# Patient Record
Sex: FEMALE | Race: WHITE | NOT HISPANIC OR LATINO | URBAN - METROPOLITAN AREA
[De-identification: names, ages, dates, MRNs, and addresses within clinical notes are randomized per-mention and may not be internally consistent; named-entity substitution may affect disease eponyms.]

---

## 2018-01-01 ENCOUNTER — OUTPATIENT (OUTPATIENT)
Dept: OUTPATIENT SERVICES | Facility: HOSPITAL | Age: 83
LOS: 1 days | Discharge: HOME | End: 2018-01-01

## 2018-01-01 DIAGNOSIS — M06.9 RHEUMATOID ARTHRITIS, UNSPECIFIED: ICD-10-CM

## 2018-01-01 DIAGNOSIS — R70.0 ELEVATED ERYTHROCYTE SEDIMENTATION RATE: ICD-10-CM

## 2018-01-01 DIAGNOSIS — N18.2 CHRONIC KIDNEY DISEASE, STAGE 2 (MILD): ICD-10-CM

## 2018-01-01 DIAGNOSIS — D64.9 ANEMIA, UNSPECIFIED: ICD-10-CM

## 2018-01-01 DIAGNOSIS — E78.00 PURE HYPERCHOLESTEROLEMIA, UNSPECIFIED: ICD-10-CM

## 2018-01-01 DIAGNOSIS — E11.9 TYPE 2 DIABETES MELLITUS WITHOUT COMPLICATIONS: ICD-10-CM

## 2019-01-01 ENCOUNTER — INPATIENT (INPATIENT)
Facility: HOSPITAL | Age: 84
LOS: 21 days | End: 2019-10-01
Attending: HOSPITALIST | Admitting: HOSPITALIST
Payer: MEDICARE

## 2019-01-01 VITALS
SYSTOLIC BLOOD PRESSURE: 146 MMHG | RESPIRATION RATE: 18 BRPM | OXYGEN SATURATION: 96 % | HEART RATE: 78 BPM | DIASTOLIC BLOOD PRESSURE: 80 MMHG | TEMPERATURE: 94 F

## 2019-01-01 VITALS — OXYGEN SATURATION: 58 % | HEART RATE: 98 BPM

## 2019-01-01 DIAGNOSIS — E03.9 HYPOTHYROIDISM, UNSPECIFIED: ICD-10-CM

## 2019-01-01 DIAGNOSIS — M75.101 UNSPECIFIED ROTATOR CUFF TEAR OR RUPTURE OF RIGHT SHOULDER, NOT SPECIFIED AS TRAUMATIC: ICD-10-CM

## 2019-01-01 LAB
% ALBUMIN: 43.7 % — SIGNIFICANT CHANGE UP
% ALPHA 1: 16.1 % — SIGNIFICANT CHANGE UP
% ALPHA 2: 13.5 % — SIGNIFICANT CHANGE UP
% BETA: 18.4 % — SIGNIFICANT CHANGE UP
% GAMMA: 8.3 % — SIGNIFICANT CHANGE UP
-  AMIKACIN: SIGNIFICANT CHANGE UP
-  AMOXICILLIN/CLAVULANIC ACID: SIGNIFICANT CHANGE UP
-  AMPICILLIN/SULBACTAM: SIGNIFICANT CHANGE UP
-  AMPICILLIN/SULBACTAM: SIGNIFICANT CHANGE UP
-  AMPICILLIN: SIGNIFICANT CHANGE UP
-  AMPICILLIN: SIGNIFICANT CHANGE UP
-  AZTREONAM: SIGNIFICANT CHANGE UP
-  CEFAZOLIN: SIGNIFICANT CHANGE UP
-  CEFAZOLIN: SIGNIFICANT CHANGE UP
-  CEFEPIME: SIGNIFICANT CHANGE UP
-  CEFOXITIN: SIGNIFICANT CHANGE UP
-  CEFOXITIN: SIGNIFICANT CHANGE UP
-  CEFTAZIDIME/AVIBACTAM: SIGNIFICANT CHANGE UP
-  CEFTAZIDIME: SIGNIFICANT CHANGE UP
-  CEFTAZIDIME: SIGNIFICANT CHANGE UP
-  CEFTOLOZANE/TAZOBACTAM: SIGNIFICANT CHANGE UP
-  CEFTRIAXONE: SIGNIFICANT CHANGE UP
-  CEFTRIAXONE: SIGNIFICANT CHANGE UP
-  CIPROFLOXACIN: SIGNIFICANT CHANGE UP
-  ERTAPENEM: SIGNIFICANT CHANGE UP
-  GENTAMICIN: SIGNIFICANT CHANGE UP
-  IMIPENEM: SIGNIFICANT CHANGE UP
-  LEVOFLOXACIN: SIGNIFICANT CHANGE UP
-  MEROPENEM: SIGNIFICANT CHANGE UP
-  NITROFURANTOIN: SIGNIFICANT CHANGE UP
-  PIPERACILLIN/TAZOBACTAM: SIGNIFICANT CHANGE UP
-  TIGECYCLINE: SIGNIFICANT CHANGE UP
-  TOBRAMYCIN: SIGNIFICANT CHANGE UP
-  TRIMETHOPRIM/SULFAMETHOXAZOLE: SIGNIFICANT CHANGE UP
-  TRIMETHOPRIM/SULFAMETHOXAZOLE: SIGNIFICANT CHANGE UP
ALBUMIN SERPL ELPH-MCNC: 1.5 G/DL — LOW (ref 3.5–5.2)
ALBUMIN SERPL ELPH-MCNC: 1.7 G/DL — LOW (ref 3.5–5.2)
ALBUMIN SERPL ELPH-MCNC: 1.7 G/DL — LOW (ref 3.5–5.2)
ALBUMIN SERPL ELPH-MCNC: 1.8 G/DL — LOW (ref 3.5–5.2)
ALBUMIN SERPL ELPH-MCNC: 1.8 G/DL — LOW (ref 3.6–5.5)
ALBUMIN SERPL ELPH-MCNC: 1.9 G/DL — LOW (ref 3.5–5.2)
ALBUMIN SERPL ELPH-MCNC: 2 G/DL — LOW (ref 3.5–5.2)
ALBUMIN SERPL ELPH-MCNC: 2.1 G/DL — LOW (ref 3.5–5.2)
ALBUMIN SERPL ELPH-MCNC: 2.1 G/DL — LOW (ref 3.5–5.2)
ALBUMIN SERPL ELPH-MCNC: 2.2 G/DL — LOW (ref 3.5–5.2)
ALBUMIN SERPL ELPH-MCNC: 2.3 G/DL — LOW (ref 3.5–5.2)
ALBUMIN SERPL ELPH-MCNC: 2.4 G/DL — LOW (ref 3.5–5.2)
ALBUMIN SERPL ELPH-MCNC: 2.5 G/DL — LOW (ref 3.5–5.2)
ALBUMIN SERPL ELPH-MCNC: 2.5 G/DL — LOW (ref 3.5–5.2)
ALBUMIN/GLOB SERPL ELPH: 0.8 RATIO — SIGNIFICANT CHANGE UP
ALDOST SERPL-MCNC: 5.5 NG/DL — SIGNIFICANT CHANGE UP
ALP SERPL-CCNC: 170 U/L — HIGH (ref 30–115)
ALP SERPL-CCNC: 173 U/L — HIGH (ref 30–115)
ALP SERPL-CCNC: 176 U/L — HIGH (ref 30–115)
ALP SERPL-CCNC: 179 U/L — HIGH (ref 30–115)
ALP SERPL-CCNC: 181 U/L — HIGH (ref 30–115)
ALP SERPL-CCNC: 182 U/L — HIGH (ref 30–115)
ALP SERPL-CCNC: 185 U/L — HIGH (ref 30–115)
ALP SERPL-CCNC: 190 U/L — HIGH (ref 30–115)
ALP SERPL-CCNC: 198 U/L — HIGH (ref 30–115)
ALP SERPL-CCNC: 206 U/L — HIGH (ref 30–115)
ALP SERPL-CCNC: 211 U/L — HIGH (ref 30–115)
ALP SERPL-CCNC: 213 U/L — HIGH (ref 30–115)
ALP SERPL-CCNC: 219 U/L — HIGH (ref 30–115)
ALP SERPL-CCNC: 224 U/L — HIGH (ref 30–115)
ALP SERPL-CCNC: 224 U/L — HIGH (ref 30–115)
ALP SERPL-CCNC: 229 U/L — HIGH (ref 30–115)
ALP SERPL-CCNC: 241 U/L — HIGH (ref 30–115)
ALP SERPL-CCNC: 246 U/L — HIGH (ref 30–115)
ALP SERPL-CCNC: 253 U/L — HIGH (ref 30–115)
ALP SERPL-CCNC: 257 U/L — HIGH (ref 30–115)
ALP SERPL-CCNC: 285 U/L — HIGH (ref 30–115)
ALP SERPL-CCNC: 314 U/L — HIGH (ref 30–115)
ALPHA1 GLOB SERPL ELPH-MCNC: 0.7 G/DL — HIGH (ref 0.1–0.4)
ALPHA2 GLOB SERPL ELPH-MCNC: 0.6 G/DL — SIGNIFICANT CHANGE UP (ref 0.5–1)
ALT FLD-CCNC: 37 U/L — SIGNIFICANT CHANGE UP (ref 0–41)
ALT FLD-CCNC: 39 U/L — SIGNIFICANT CHANGE UP (ref 0–41)
ALT FLD-CCNC: 40 U/L — SIGNIFICANT CHANGE UP (ref 0–41)
ALT FLD-CCNC: 41 U/L — SIGNIFICANT CHANGE UP (ref 0–41)
ALT FLD-CCNC: 42 U/L — HIGH (ref 0–41)
ALT FLD-CCNC: 43 U/L — HIGH (ref 0–41)
ALT FLD-CCNC: 45 U/L — HIGH (ref 0–41)
ALT FLD-CCNC: 45 U/L — HIGH (ref 0–41)
ALT FLD-CCNC: 46 U/L — HIGH (ref 0–41)
ALT FLD-CCNC: 46 U/L — HIGH (ref 0–41)
ALT FLD-CCNC: 48 U/L — HIGH (ref 0–41)
ALT FLD-CCNC: 48 U/L — HIGH (ref 0–41)
ALT FLD-CCNC: 51 U/L — HIGH (ref 0–41)
ALT FLD-CCNC: 53 U/L — HIGH (ref 0–41)
ALT FLD-CCNC: 54 U/L — HIGH (ref 0–41)
ALT FLD-CCNC: 62 U/L — HIGH (ref 0–41)
ALT FLD-CCNC: 62 U/L — HIGH (ref 0–41)
ALT FLD-CCNC: 71 U/L — HIGH (ref 0–41)
ALT FLD-CCNC: 72 U/L — HIGH (ref 0–41)
ALT FLD-CCNC: 72 U/L — HIGH (ref 0–41)
ANION GAP SERPL CALC-SCNC: 10 MMOL/L — SIGNIFICANT CHANGE UP (ref 7–14)
ANION GAP SERPL CALC-SCNC: 11 MMOL/L — SIGNIFICANT CHANGE UP (ref 7–14)
ANION GAP SERPL CALC-SCNC: 12 MMOL/L — SIGNIFICANT CHANGE UP (ref 7–14)
ANION GAP SERPL CALC-SCNC: 13 MMOL/L — SIGNIFICANT CHANGE UP (ref 7–14)
ANION GAP SERPL CALC-SCNC: 14 MMOL/L — SIGNIFICANT CHANGE UP (ref 7–14)
ANION GAP SERPL CALC-SCNC: 15 MMOL/L — HIGH (ref 7–14)
ANION GAP SERPL CALC-SCNC: 16 MMOL/L — HIGH (ref 7–14)
ANION GAP SERPL CALC-SCNC: 16 MMOL/L — HIGH (ref 7–14)
ANION GAP SERPL CALC-SCNC: 19 MMOL/L — HIGH (ref 7–14)
ANION GAP SERPL CALC-SCNC: 8 MMOL/L — SIGNIFICANT CHANGE UP (ref 7–14)
ANION GAP SERPL CALC-SCNC: 8 MMOL/L — SIGNIFICANT CHANGE UP (ref 7–14)
ANION GAP SERPL CALC-SCNC: 9 MMOL/L — SIGNIFICANT CHANGE UP (ref 7–14)
ANION GAP SERPL CALC-SCNC: 9 MMOL/L — SIGNIFICANT CHANGE UP (ref 7–14)
ANISOCYTOSIS BLD QL: SLIGHT — SIGNIFICANT CHANGE UP
APPEARANCE UR: ABNORMAL
APPEARANCE UR: ABNORMAL
APTT BLD: 106.9 SEC — CRITICAL HIGH (ref 27–39.2)
APTT BLD: 23.4 SEC — CRITICAL LOW (ref 27–39.2)
APTT BLD: 27 SEC — SIGNIFICANT CHANGE UP (ref 27–39.2)
APTT BLD: 28 SEC — SIGNIFICANT CHANGE UP (ref 27–39.2)
APTT BLD: 28.4 SEC — SIGNIFICANT CHANGE UP (ref 27–39.2)
APTT BLD: 29.1 SEC — SIGNIFICANT CHANGE UP (ref 27–39.2)
APTT BLD: 31.2 SEC — SIGNIFICANT CHANGE UP (ref 27–39.2)
APTT BLD: 33.7 SEC — SIGNIFICANT CHANGE UP (ref 27–39.2)
APTT BLD: 44.1 SEC — HIGH (ref 27–39.2)
APTT BLD: 48.3 SEC — HIGH (ref 27–39.2)
APTT BLD: 48.9 SEC — HIGH (ref 27–39.2)
APTT BLD: 50.3 SEC — HIGH (ref 27–39.2)
APTT BLD: 52.4 SEC — HIGH (ref 27–39.2)
APTT BLD: 52.9 SEC — HIGH (ref 27–39.2)
APTT BLD: 56.1 SEC — HIGH (ref 27–39.2)
APTT BLD: 56.3 SEC — HIGH (ref 27–39.2)
APTT BLD: 58.6 SEC — HIGH (ref 27–39.2)
APTT BLD: 61.1 SEC — HIGH (ref 27–39.2)
APTT BLD: 61.1 SEC — HIGH (ref 27–39.2)
APTT BLD: 61.6 SEC — HIGH (ref 27–39.2)
APTT BLD: 62.7 SEC — HIGH (ref 27–39.2)
APTT BLD: 63.9 SEC — HIGH (ref 27–39.2)
APTT BLD: 66.8 SEC — HIGH (ref 27–39.2)
APTT BLD: 70.5 SEC — CRITICAL HIGH (ref 27–39.2)
APTT BLD: 76.9 SEC — CRITICAL HIGH (ref 27–39.2)
APTT BLD: 77.3 SEC — CRITICAL HIGH (ref 27–39.2)
APTT BLD: 77.5 SEC — CRITICAL HIGH (ref 27–39.2)
APTT BLD: 81.3 SEC — CRITICAL HIGH (ref 27–39.2)
APTT BLD: 82.6 SEC — CRITICAL HIGH (ref 27–39.2)
APTT BLD: 82.8 SEC — CRITICAL HIGH (ref 27–39.2)
APTT BLD: 86 SEC — CRITICAL HIGH (ref 27–39.2)
APTT BLD: 93.2 SEC — CRITICAL HIGH (ref 27–39.2)
APTT BLD: 97.5 SEC — CRITICAL HIGH (ref 27–39.2)
APTT BLD: >200 SEC — CRITICAL HIGH (ref 27–39.2)
AST SERPL-CCNC: 20 U/L — SIGNIFICANT CHANGE UP (ref 0–41)
AST SERPL-CCNC: 21 U/L — SIGNIFICANT CHANGE UP (ref 0–41)
AST SERPL-CCNC: 22 U/L — SIGNIFICANT CHANGE UP (ref 0–41)
AST SERPL-CCNC: 22 U/L — SIGNIFICANT CHANGE UP (ref 0–41)
AST SERPL-CCNC: 23 U/L — SIGNIFICANT CHANGE UP (ref 0–41)
AST SERPL-CCNC: 24 U/L — SIGNIFICANT CHANGE UP (ref 0–41)
AST SERPL-CCNC: 25 U/L — SIGNIFICANT CHANGE UP (ref 0–41)
AST SERPL-CCNC: 27 U/L — SIGNIFICANT CHANGE UP (ref 0–41)
AST SERPL-CCNC: 28 U/L — SIGNIFICANT CHANGE UP (ref 0–41)
AST SERPL-CCNC: 30 U/L — SIGNIFICANT CHANGE UP (ref 0–41)
AST SERPL-CCNC: 31 U/L — SIGNIFICANT CHANGE UP (ref 0–41)
AST SERPL-CCNC: 32 U/L — SIGNIFICANT CHANGE UP (ref 0–41)
AST SERPL-CCNC: 33 U/L — SIGNIFICANT CHANGE UP (ref 0–41)
AST SERPL-CCNC: 39 U/L — SIGNIFICANT CHANGE UP (ref 0–41)
AST SERPL-CCNC: 41 U/L — SIGNIFICANT CHANGE UP (ref 0–41)
AST SERPL-CCNC: 42 U/L — HIGH (ref 0–41)
AST SERPL-CCNC: 49 U/L — HIGH (ref 0–41)
AST SERPL-CCNC: 50 U/L — HIGH (ref 0–41)
B-GLOBULIN SERPL ELPH-MCNC: 0.8 G/DL — SIGNIFICANT CHANGE UP (ref 0.5–1)
BACTERIA # UR AUTO: ABNORMAL
BACTERIA # UR AUTO: NEGATIVE — SIGNIFICANT CHANGE UP
BASE EXCESS BLDA CALC-SCNC: -3 MMOL/L — LOW (ref -2–2)
BASE EXCESS BLDV CALC-SCNC: -6.3 MMOL/L — LOW (ref -2–2)
BASE EXCESS BLDV CALC-SCNC: -6.3 MMOL/L — LOW (ref -2–2)
BASOPHILS # BLD AUTO: 0 K/UL — SIGNIFICANT CHANGE UP (ref 0–0.2)
BASOPHILS # BLD AUTO: 0.01 K/UL — SIGNIFICANT CHANGE UP (ref 0–0.2)
BASOPHILS # BLD AUTO: 0.04 K/UL — SIGNIFICANT CHANGE UP (ref 0–0.2)
BASOPHILS # BLD AUTO: 0.05 K/UL — SIGNIFICANT CHANGE UP (ref 0–0.2)
BASOPHILS # BLD AUTO: 0.06 K/UL — SIGNIFICANT CHANGE UP (ref 0–0.2)
BASOPHILS # BLD AUTO: 0.07 K/UL — SIGNIFICANT CHANGE UP (ref 0–0.2)
BASOPHILS # BLD AUTO: 0.08 K/UL — SIGNIFICANT CHANGE UP (ref 0–0.2)
BASOPHILS # BLD AUTO: 0.09 K/UL — SIGNIFICANT CHANGE UP (ref 0–0.2)
BASOPHILS # BLD AUTO: 0.1 K/UL — SIGNIFICANT CHANGE UP (ref 0–0.2)
BASOPHILS NFR BLD AUTO: 0 % — SIGNIFICANT CHANGE UP (ref 0–1)
BASOPHILS NFR BLD AUTO: 0.1 % — SIGNIFICANT CHANGE UP (ref 0–1)
BASOPHILS NFR BLD AUTO: 0.3 % — SIGNIFICANT CHANGE UP (ref 0–1)
BASOPHILS NFR BLD AUTO: 0.3 % — SIGNIFICANT CHANGE UP (ref 0–1)
BASOPHILS NFR BLD AUTO: 0.4 % — SIGNIFICANT CHANGE UP (ref 0–1)
BASOPHILS NFR BLD AUTO: 0.5 % — SIGNIFICANT CHANGE UP (ref 0–1)
BASOPHILS NFR BLD AUTO: 0.6 % — SIGNIFICANT CHANGE UP (ref 0–1)
BASOPHILS NFR BLD AUTO: 0.7 % — SIGNIFICANT CHANGE UP (ref 0–1)
BASOPHILS NFR BLD AUTO: 0.9 % — SIGNIFICANT CHANGE UP (ref 0–1)
BILIRUB SERPL-MCNC: 0.2 MG/DL — SIGNIFICANT CHANGE UP (ref 0.2–1.2)
BILIRUB SERPL-MCNC: 0.3 MG/DL — SIGNIFICANT CHANGE UP (ref 0.2–1.2)
BILIRUB SERPL-MCNC: 0.4 MG/DL — SIGNIFICANT CHANGE UP (ref 0.2–1.2)
BILIRUB SERPL-MCNC: 0.6 MG/DL — SIGNIFICANT CHANGE UP (ref 0.2–1.2)
BILIRUB SERPL-MCNC: <0.2 MG/DL — SIGNIFICANT CHANGE UP (ref 0.2–1.2)
BILIRUB UR-MCNC: NEGATIVE — SIGNIFICANT CHANGE UP
BILIRUB UR-MCNC: NEGATIVE — SIGNIFICANT CHANGE UP
BLD GP AB SCN SERPL QL: SIGNIFICANT CHANGE UP
BUN SERPL-MCNC: 54 MG/DL — HIGH (ref 10–20)
BUN SERPL-MCNC: 56 MG/DL — HIGH (ref 10–20)
BUN SERPL-MCNC: 56 MG/DL — HIGH (ref 10–20)
BUN SERPL-MCNC: 57 MG/DL — HIGH (ref 10–20)
BUN SERPL-MCNC: 58 MG/DL — HIGH (ref 10–20)
BUN SERPL-MCNC: 59 MG/DL — HIGH (ref 10–20)
BUN SERPL-MCNC: 59 MG/DL — HIGH (ref 10–20)
BUN SERPL-MCNC: 60 MG/DL — HIGH (ref 10–20)
BUN SERPL-MCNC: 60 MG/DL — HIGH (ref 10–20)
BUN SERPL-MCNC: 62 MG/DL — CRITICAL HIGH (ref 10–20)
BUN SERPL-MCNC: 62 MG/DL — CRITICAL HIGH (ref 10–20)
BUN SERPL-MCNC: 63 MG/DL — CRITICAL HIGH (ref 10–20)
BUN SERPL-MCNC: 63 MG/DL — CRITICAL HIGH (ref 10–20)
BUN SERPL-MCNC: 64 MG/DL — CRITICAL HIGH (ref 10–20)
BUN SERPL-MCNC: 65 MG/DL — CRITICAL HIGH (ref 10–20)
BUN SERPL-MCNC: 67 MG/DL — CRITICAL HIGH (ref 10–20)
BUN SERPL-MCNC: 68 MG/DL — CRITICAL HIGH (ref 10–20)
BUN SERPL-MCNC: 70 MG/DL — CRITICAL HIGH (ref 10–20)
BUN SERPL-MCNC: 73 MG/DL — CRITICAL HIGH (ref 10–20)
BUN SERPL-MCNC: 74 MG/DL — CRITICAL HIGH (ref 10–20)
BUN SERPL-MCNC: 75 MG/DL — CRITICAL HIGH (ref 10–20)
BUN SERPL-MCNC: 75 MG/DL — CRITICAL HIGH (ref 10–20)
BUN SERPL-MCNC: 76 MG/DL — CRITICAL HIGH (ref 10–20)
BUN SERPL-MCNC: 78 MG/DL — CRITICAL HIGH (ref 10–20)
BUN SERPL-MCNC: 80 MG/DL — CRITICAL HIGH (ref 10–20)
BUN SERPL-MCNC: 90 MG/DL — CRITICAL HIGH (ref 10–20)
BURR CELLS BLD QL SMEAR: PRESENT — SIGNIFICANT CHANGE UP
C DIFF BY PCR RESULT: POSITIVE
C DIFF TOX GENS STL QL NAA+PROBE: SIGNIFICANT CHANGE UP
CA-I SERPL-SCNC: 1.31 MMOL/L — HIGH (ref 1.12–1.3)
CA-I SERPL-SCNC: 1.35 MMOL/L — HIGH (ref 1.12–1.3)
CALCIUM SERPL-MCNC: 7.4 MG/DL — LOW (ref 8.5–10.1)
CALCIUM SERPL-MCNC: 7.5 MG/DL — LOW (ref 8.5–10.1)
CALCIUM SERPL-MCNC: 7.6 MG/DL — LOW (ref 8.5–10.1)
CALCIUM SERPL-MCNC: 7.8 MG/DL — LOW (ref 8.5–10.1)
CALCIUM SERPL-MCNC: 8.1 MG/DL — LOW (ref 8.5–10.1)
CALCIUM SERPL-MCNC: 8.3 MG/DL — LOW (ref 8.5–10.1)
CALCIUM SERPL-MCNC: 8.4 MG/DL — LOW (ref 8.5–10.1)
CALCIUM SERPL-MCNC: 8.5 MG/DL — SIGNIFICANT CHANGE UP (ref 8.5–10.1)
CALCIUM SERPL-MCNC: 8.5 MG/DL — SIGNIFICANT CHANGE UP (ref 8.5–10.1)
CALCIUM SERPL-MCNC: 8.6 MG/DL — SIGNIFICANT CHANGE UP (ref 8.5–10.1)
CALCIUM SERPL-MCNC: 8.6 MG/DL — SIGNIFICANT CHANGE UP (ref 8.5–10.1)
CALCIUM SERPL-MCNC: 8.7 MG/DL — SIGNIFICANT CHANGE UP (ref 8.5–10.1)
CALCIUM SERPL-MCNC: 8.7 MG/DL — SIGNIFICANT CHANGE UP (ref 8.5–10.1)
CALCIUM SERPL-MCNC: 8.8 MG/DL — SIGNIFICANT CHANGE UP (ref 8.5–10.1)
CALCIUM SERPL-MCNC: 8.8 MG/DL — SIGNIFICANT CHANGE UP (ref 8.5–10.1)
CALCIUM SERPL-MCNC: 8.9 MG/DL — SIGNIFICANT CHANGE UP (ref 8.5–10.1)
CALCIUM SERPL-MCNC: 9 MG/DL — SIGNIFICANT CHANGE UP (ref 8.5–10.1)
CALCIUM SERPL-MCNC: 9 MG/DL — SIGNIFICANT CHANGE UP (ref 8.5–10.1)
CALCIUM SERPL-MCNC: 9.1 MG/DL — SIGNIFICANT CHANGE UP (ref 8.5–10.1)
CALCIUM SERPL-MCNC: 9.2 MG/DL — SIGNIFICANT CHANGE UP (ref 8.5–10.1)
CALCIUM SERPL-MCNC: 9.3 MG/DL — SIGNIFICANT CHANGE UP (ref 8.5–10.1)
CALCIUM SERPL-MCNC: 9.3 MG/DL — SIGNIFICANT CHANGE UP (ref 8.5–10.1)
CALCIUM SERPL-MCNC: 9.4 MG/DL — SIGNIFICANT CHANGE UP (ref 8.5–10.1)
CALCIUM SERPL-MCNC: 9.7 MG/DL — SIGNIFICANT CHANGE UP (ref 8.5–10.1)
CHLORIDE SERPL-SCNC: 100 MMOL/L — SIGNIFICANT CHANGE UP (ref 98–110)
CHLORIDE SERPL-SCNC: 101 MMOL/L — SIGNIFICANT CHANGE UP (ref 98–110)
CHLORIDE SERPL-SCNC: 101 MMOL/L — SIGNIFICANT CHANGE UP (ref 98–110)
CHLORIDE SERPL-SCNC: 102 MMOL/L — SIGNIFICANT CHANGE UP (ref 98–110)
CHLORIDE SERPL-SCNC: 102 MMOL/L — SIGNIFICANT CHANGE UP (ref 98–110)
CHLORIDE SERPL-SCNC: 104 MMOL/L — SIGNIFICANT CHANGE UP (ref 98–110)
CHLORIDE SERPL-SCNC: 105 MMOL/L — SIGNIFICANT CHANGE UP (ref 98–110)
CHLORIDE SERPL-SCNC: 106 MMOL/L — SIGNIFICANT CHANGE UP (ref 98–110)
CHLORIDE SERPL-SCNC: 107 MMOL/L — SIGNIFICANT CHANGE UP (ref 98–110)
CHLORIDE SERPL-SCNC: 107 MMOL/L — SIGNIFICANT CHANGE UP (ref 98–110)
CHLORIDE SERPL-SCNC: 108 MMOL/L — SIGNIFICANT CHANGE UP (ref 98–110)
CHLORIDE SERPL-SCNC: 109 MMOL/L — SIGNIFICANT CHANGE UP (ref 98–110)
CHLORIDE SERPL-SCNC: 110 MMOL/L — SIGNIFICANT CHANGE UP (ref 98–110)
CHLORIDE SERPL-SCNC: 111 MMOL/L — HIGH (ref 98–110)
CHLORIDE SERPL-SCNC: 111 MMOL/L — HIGH (ref 98–110)
CHLORIDE SERPL-SCNC: 112 MMOL/L — HIGH (ref 98–110)
CHLORIDE SERPL-SCNC: 112 MMOL/L — HIGH (ref 98–110)
CHLORIDE SERPL-SCNC: 95 MMOL/L — LOW (ref 98–110)
CHLORIDE SERPL-SCNC: 95 MMOL/L — LOW (ref 98–110)
CHLORIDE SERPL-SCNC: 96 MMOL/L — LOW (ref 98–110)
CHLORIDE SERPL-SCNC: 96 MMOL/L — LOW (ref 98–110)
CHLORIDE SERPL-SCNC: 98 MMOL/L — SIGNIFICANT CHANGE UP (ref 98–110)
CHLORIDE SERPL-SCNC: 98 MMOL/L — SIGNIFICANT CHANGE UP (ref 98–110)
CHLORIDE SERPL-SCNC: 99 MMOL/L — SIGNIFICANT CHANGE UP (ref 98–110)
CHLORIDE SERPL-SCNC: 99 MMOL/L — SIGNIFICANT CHANGE UP (ref 98–110)
CHLORIDE UR-SCNC: 96 — SIGNIFICANT CHANGE UP
CK SERPL-CCNC: 32 U/L — SIGNIFICANT CHANGE UP (ref 0–225)
CK SERPL-CCNC: 32 U/L — SIGNIFICANT CHANGE UP (ref 0–225)
CO2 SERPL-SCNC: 12 MMOL/L — LOW (ref 17–32)
CO2 SERPL-SCNC: 14 MMOL/L — LOW (ref 17–32)
CO2 SERPL-SCNC: 15 MMOL/L — LOW (ref 17–32)
CO2 SERPL-SCNC: 16 MMOL/L — LOW (ref 17–32)
CO2 SERPL-SCNC: 17 MMOL/L — SIGNIFICANT CHANGE UP (ref 17–32)
CO2 SERPL-SCNC: 18 MMOL/L — SIGNIFICANT CHANGE UP (ref 17–32)
CO2 SERPL-SCNC: 19 MMOL/L — SIGNIFICANT CHANGE UP (ref 17–32)
CO2 SERPL-SCNC: 21 MMOL/L — SIGNIFICANT CHANGE UP (ref 17–32)
CO2 SERPL-SCNC: 22 MMOL/L — SIGNIFICANT CHANGE UP (ref 17–32)
COLOR SPEC: YELLOW — SIGNIFICANT CHANGE UP
COLOR SPEC: YELLOW — SIGNIFICANT CHANGE UP
CREAT ?TM UR-MCNC: 26 MG/DL — SIGNIFICANT CHANGE UP
CREAT SERPL-MCNC: 1.7 MG/DL — HIGH (ref 0.7–1.5)
CREAT SERPL-MCNC: 1.7 MG/DL — HIGH (ref 0.7–1.5)
CREAT SERPL-MCNC: 1.8 MG/DL — HIGH (ref 0.7–1.5)
CREAT SERPL-MCNC: 1.9 MG/DL — HIGH (ref 0.7–1.5)
CREAT SERPL-MCNC: 2 MG/DL — HIGH (ref 0.7–1.5)
CREAT SERPL-MCNC: 2.1 MG/DL — HIGH (ref 0.7–1.5)
CREAT SERPL-MCNC: 2.2 MG/DL — HIGH (ref 0.7–1.5)
CREAT SERPL-MCNC: 2.3 MG/DL — HIGH (ref 0.7–1.5)
CREAT SERPL-MCNC: 2.3 MG/DL — HIGH (ref 0.7–1.5)
CREAT SERPL-MCNC: 2.4 MG/DL — HIGH (ref 0.7–1.5)
CREAT SERPL-MCNC: 2.4 MG/DL — HIGH (ref 0.7–1.5)
CREAT SERPL-MCNC: 2.6 MG/DL — HIGH (ref 0.7–1.5)
CREAT SERPL-MCNC: 2.7 MG/DL — HIGH (ref 0.7–1.5)
CREAT SERPL-MCNC: 2.7 MG/DL — HIGH (ref 0.7–1.5)
CREAT SERPL-MCNC: 3.1 MG/DL — HIGH (ref 0.7–1.5)
CREAT SERPL-MCNC: 3.1 MG/DL — HIGH (ref 0.7–1.5)
CULTURE RESULTS: SIGNIFICANT CHANGE UP
DIFF PNL FLD: ABNORMAL
DIFF PNL FLD: ABNORMAL
EOSINOPHIL # BLD AUTO: 0 K/UL — SIGNIFICANT CHANGE UP (ref 0–0.7)
EOSINOPHIL # BLD AUTO: 0.02 K/UL — SIGNIFICANT CHANGE UP (ref 0–0.7)
EOSINOPHIL # BLD AUTO: 0.04 K/UL — SIGNIFICANT CHANGE UP (ref 0–0.7)
EOSINOPHIL # BLD AUTO: 0.08 K/UL — SIGNIFICANT CHANGE UP (ref 0–0.7)
EOSINOPHIL # BLD AUTO: 0.11 K/UL — SIGNIFICANT CHANGE UP (ref 0–0.7)
EOSINOPHIL # BLD AUTO: 0.11 K/UL — SIGNIFICANT CHANGE UP (ref 0–0.7)
EOSINOPHIL # BLD AUTO: 0.12 K/UL — SIGNIFICANT CHANGE UP (ref 0–0.7)
EOSINOPHIL # BLD AUTO: 0.16 K/UL — SIGNIFICANT CHANGE UP (ref 0–0.7)
EOSINOPHIL # BLD AUTO: 0.17 K/UL — SIGNIFICANT CHANGE UP (ref 0–0.7)
EOSINOPHIL # BLD AUTO: 0.23 K/UL — SIGNIFICANT CHANGE UP (ref 0–0.7)
EOSINOPHIL NFR BLD AUTO: 0 % — SIGNIFICANT CHANGE UP (ref 0–8)
EOSINOPHIL NFR BLD AUTO: 0.1 % — SIGNIFICANT CHANGE UP (ref 0–8)
EOSINOPHIL NFR BLD AUTO: 0.3 % — SIGNIFICANT CHANGE UP (ref 0–8)
EOSINOPHIL NFR BLD AUTO: 0.5 % — SIGNIFICANT CHANGE UP (ref 0–8)
EOSINOPHIL NFR BLD AUTO: 0.7 % — SIGNIFICANT CHANGE UP (ref 0–8)
EOSINOPHIL NFR BLD AUTO: 0.9 % — SIGNIFICANT CHANGE UP (ref 0–8)
EOSINOPHIL NFR BLD AUTO: 0.9 % — SIGNIFICANT CHANGE UP (ref 0–8)
EOSINOPHIL NFR BLD AUTO: 1 % — SIGNIFICANT CHANGE UP (ref 0–8)
EOSINOPHIL NFR BLD AUTO: 1.7 % — SIGNIFICANT CHANGE UP (ref 0–8)
EOSINOPHIL NFR BLD AUTO: 2 % — SIGNIFICANT CHANGE UP (ref 0–8)
EPI CELLS # UR: 0 /HPF — SIGNIFICANT CHANGE UP (ref 0–5)
EPI CELLS # UR: 19 /HPF — HIGH (ref 0–5)
ESTIMATED AVERAGE GLUCOSE: 252 MG/DL — HIGH (ref 68–114)
FERRITIN SERPL-MCNC: 198 NG/ML — HIGH (ref 15–150)
GAMMA GLOBULIN: 0.3 G/DL — LOW (ref 0.6–1.6)
GAS PNL BLDA: SIGNIFICANT CHANGE UP
GAS PNL BLDV: 133 MMOL/L — LOW (ref 136–145)
GAS PNL BLDV: 134 MMOL/L — LOW (ref 136–145)
GAS PNL BLDV: SIGNIFICANT CHANGE UP
GIANT PLATELETS BLD QL SMEAR: PRESENT — SIGNIFICANT CHANGE UP
GLUCOSE BLDC GLUCOMTR-MCNC: 102 MG/DL — HIGH (ref 70–99)
GLUCOSE BLDC GLUCOMTR-MCNC: 103 MG/DL — HIGH (ref 70–99)
GLUCOSE BLDC GLUCOMTR-MCNC: 105 MG/DL — HIGH (ref 70–99)
GLUCOSE BLDC GLUCOMTR-MCNC: 108 MG/DL — HIGH (ref 70–99)
GLUCOSE BLDC GLUCOMTR-MCNC: 112 MG/DL — HIGH (ref 70–99)
GLUCOSE BLDC GLUCOMTR-MCNC: 122 MG/DL — HIGH (ref 70–99)
GLUCOSE BLDC GLUCOMTR-MCNC: 122 MG/DL — HIGH (ref 70–99)
GLUCOSE BLDC GLUCOMTR-MCNC: 125 MG/DL — HIGH (ref 70–99)
GLUCOSE BLDC GLUCOMTR-MCNC: 128 MG/DL — HIGH (ref 70–99)
GLUCOSE BLDC GLUCOMTR-MCNC: 135 MG/DL — HIGH (ref 70–99)
GLUCOSE BLDC GLUCOMTR-MCNC: 137 MG/DL — HIGH (ref 70–99)
GLUCOSE BLDC GLUCOMTR-MCNC: 139 MG/DL — HIGH (ref 70–99)
GLUCOSE BLDC GLUCOMTR-MCNC: 141 MG/DL — HIGH (ref 70–99)
GLUCOSE BLDC GLUCOMTR-MCNC: 142 MG/DL — HIGH (ref 70–99)
GLUCOSE BLDC GLUCOMTR-MCNC: 147 MG/DL — HIGH (ref 70–99)
GLUCOSE BLDC GLUCOMTR-MCNC: 148 MG/DL — HIGH (ref 70–99)
GLUCOSE BLDC GLUCOMTR-MCNC: 154 MG/DL — HIGH (ref 70–99)
GLUCOSE BLDC GLUCOMTR-MCNC: 156 MG/DL — HIGH (ref 70–99)
GLUCOSE BLDC GLUCOMTR-MCNC: 167 MG/DL — HIGH (ref 70–99)
GLUCOSE BLDC GLUCOMTR-MCNC: 168 MG/DL — HIGH (ref 70–99)
GLUCOSE BLDC GLUCOMTR-MCNC: 173 MG/DL — HIGH (ref 70–99)
GLUCOSE BLDC GLUCOMTR-MCNC: 175 MG/DL — HIGH (ref 70–99)
GLUCOSE BLDC GLUCOMTR-MCNC: 182 MG/DL — HIGH (ref 70–99)
GLUCOSE BLDC GLUCOMTR-MCNC: 182 MG/DL — HIGH (ref 70–99)
GLUCOSE BLDC GLUCOMTR-MCNC: 183 MG/DL — HIGH (ref 70–99)
GLUCOSE BLDC GLUCOMTR-MCNC: 184 MG/DL — HIGH (ref 70–99)
GLUCOSE BLDC GLUCOMTR-MCNC: 186 MG/DL — HIGH (ref 70–99)
GLUCOSE BLDC GLUCOMTR-MCNC: 187 MG/DL — HIGH (ref 70–99)
GLUCOSE BLDC GLUCOMTR-MCNC: 188 MG/DL — HIGH (ref 70–99)
GLUCOSE BLDC GLUCOMTR-MCNC: 190 MG/DL — HIGH (ref 70–99)
GLUCOSE BLDC GLUCOMTR-MCNC: 190 MG/DL — HIGH (ref 70–99)
GLUCOSE BLDC GLUCOMTR-MCNC: 192 MG/DL — HIGH (ref 70–99)
GLUCOSE BLDC GLUCOMTR-MCNC: 195 MG/DL — HIGH (ref 70–99)
GLUCOSE BLDC GLUCOMTR-MCNC: 196 MG/DL — HIGH (ref 70–99)
GLUCOSE BLDC GLUCOMTR-MCNC: 198 MG/DL — HIGH (ref 70–99)
GLUCOSE BLDC GLUCOMTR-MCNC: 199 MG/DL — HIGH (ref 70–99)
GLUCOSE BLDC GLUCOMTR-MCNC: 200 MG/DL — HIGH (ref 70–99)
GLUCOSE BLDC GLUCOMTR-MCNC: 203 MG/DL — HIGH (ref 70–99)
GLUCOSE BLDC GLUCOMTR-MCNC: 208 MG/DL — HIGH (ref 70–99)
GLUCOSE BLDC GLUCOMTR-MCNC: 214 MG/DL — HIGH (ref 70–99)
GLUCOSE BLDC GLUCOMTR-MCNC: 221 MG/DL — HIGH (ref 70–99)
GLUCOSE BLDC GLUCOMTR-MCNC: 222 MG/DL — HIGH (ref 70–99)
GLUCOSE BLDC GLUCOMTR-MCNC: 222 MG/DL — HIGH (ref 70–99)
GLUCOSE BLDC GLUCOMTR-MCNC: 223 MG/DL — HIGH (ref 70–99)
GLUCOSE BLDC GLUCOMTR-MCNC: 227 MG/DL — HIGH (ref 70–99)
GLUCOSE BLDC GLUCOMTR-MCNC: 228 MG/DL — HIGH (ref 70–99)
GLUCOSE BLDC GLUCOMTR-MCNC: 239 MG/DL — HIGH (ref 70–99)
GLUCOSE BLDC GLUCOMTR-MCNC: 242 MG/DL — HIGH (ref 70–99)
GLUCOSE BLDC GLUCOMTR-MCNC: 248 MG/DL — HIGH (ref 70–99)
GLUCOSE BLDC GLUCOMTR-MCNC: 248 MG/DL — HIGH (ref 70–99)
GLUCOSE BLDC GLUCOMTR-MCNC: 249 MG/DL — HIGH (ref 70–99)
GLUCOSE BLDC GLUCOMTR-MCNC: 252 MG/DL — HIGH (ref 70–99)
GLUCOSE BLDC GLUCOMTR-MCNC: 255 MG/DL — HIGH (ref 70–99)
GLUCOSE BLDC GLUCOMTR-MCNC: 256 MG/DL — HIGH (ref 70–99)
GLUCOSE BLDC GLUCOMTR-MCNC: 257 MG/DL — HIGH (ref 70–99)
GLUCOSE BLDC GLUCOMTR-MCNC: 259 MG/DL — HIGH (ref 70–99)
GLUCOSE BLDC GLUCOMTR-MCNC: 259 MG/DL — HIGH (ref 70–99)
GLUCOSE BLDC GLUCOMTR-MCNC: 261 MG/DL — HIGH (ref 70–99)
GLUCOSE BLDC GLUCOMTR-MCNC: 263 MG/DL — HIGH (ref 70–99)
GLUCOSE BLDC GLUCOMTR-MCNC: 264 MG/DL — HIGH (ref 70–99)
GLUCOSE BLDC GLUCOMTR-MCNC: 264 MG/DL — HIGH (ref 70–99)
GLUCOSE BLDC GLUCOMTR-MCNC: 266 MG/DL — HIGH (ref 70–99)
GLUCOSE BLDC GLUCOMTR-MCNC: 275 MG/DL — HIGH (ref 70–99)
GLUCOSE BLDC GLUCOMTR-MCNC: 281 MG/DL — HIGH (ref 70–99)
GLUCOSE BLDC GLUCOMTR-MCNC: 281 MG/DL — HIGH (ref 70–99)
GLUCOSE BLDC GLUCOMTR-MCNC: 296 MG/DL — HIGH (ref 70–99)
GLUCOSE BLDC GLUCOMTR-MCNC: 310 MG/DL — HIGH (ref 70–99)
GLUCOSE BLDC GLUCOMTR-MCNC: 312 MG/DL — HIGH (ref 70–99)
GLUCOSE BLDC GLUCOMTR-MCNC: 313 MG/DL — HIGH (ref 70–99)
GLUCOSE BLDC GLUCOMTR-MCNC: 314 MG/DL — HIGH (ref 70–99)
GLUCOSE BLDC GLUCOMTR-MCNC: 314 MG/DL — HIGH (ref 70–99)
GLUCOSE BLDC GLUCOMTR-MCNC: 315 MG/DL — HIGH (ref 70–99)
GLUCOSE BLDC GLUCOMTR-MCNC: 318 MG/DL — HIGH (ref 70–99)
GLUCOSE BLDC GLUCOMTR-MCNC: 319 MG/DL — HIGH (ref 70–99)
GLUCOSE BLDC GLUCOMTR-MCNC: 319 MG/DL — HIGH (ref 70–99)
GLUCOSE BLDC GLUCOMTR-MCNC: 320 MG/DL — HIGH (ref 70–99)
GLUCOSE BLDC GLUCOMTR-MCNC: 321 MG/DL — HIGH (ref 70–99)
GLUCOSE BLDC GLUCOMTR-MCNC: 328 MG/DL — HIGH (ref 70–99)
GLUCOSE BLDC GLUCOMTR-MCNC: 336 MG/DL — HIGH (ref 70–99)
GLUCOSE BLDC GLUCOMTR-MCNC: 345 MG/DL — HIGH (ref 70–99)
GLUCOSE BLDC GLUCOMTR-MCNC: 346 MG/DL — HIGH (ref 70–99)
GLUCOSE BLDC GLUCOMTR-MCNC: 349 MG/DL — HIGH (ref 70–99)
GLUCOSE BLDC GLUCOMTR-MCNC: 357 MG/DL — HIGH (ref 70–99)
GLUCOSE BLDC GLUCOMTR-MCNC: 359 MG/DL — HIGH (ref 70–99)
GLUCOSE BLDC GLUCOMTR-MCNC: 364 MG/DL — HIGH (ref 70–99)
GLUCOSE BLDC GLUCOMTR-MCNC: 376 MG/DL — HIGH (ref 70–99)
GLUCOSE BLDC GLUCOMTR-MCNC: 396 MG/DL — HIGH (ref 70–99)
GLUCOSE BLDC GLUCOMTR-MCNC: 400 MG/DL — HIGH (ref 70–99)
GLUCOSE BLDC GLUCOMTR-MCNC: 448 MG/DL — HIGH (ref 70–99)
GLUCOSE BLDC GLUCOMTR-MCNC: 56 MG/DL — LOW (ref 70–99)
GLUCOSE BLDC GLUCOMTR-MCNC: 75 MG/DL — SIGNIFICANT CHANGE UP (ref 70–99)
GLUCOSE BLDC GLUCOMTR-MCNC: 76 MG/DL — SIGNIFICANT CHANGE UP (ref 70–99)
GLUCOSE BLDC GLUCOMTR-MCNC: 85 MG/DL — SIGNIFICANT CHANGE UP (ref 70–99)
GLUCOSE BLDC GLUCOMTR-MCNC: 88 MG/DL — SIGNIFICANT CHANGE UP (ref 70–99)
GLUCOSE BLDC GLUCOMTR-MCNC: 89 MG/DL — SIGNIFICANT CHANGE UP (ref 70–99)
GLUCOSE BLDC GLUCOMTR-MCNC: 89 MG/DL — SIGNIFICANT CHANGE UP (ref 70–99)
GLUCOSE BLDC GLUCOMTR-MCNC: 91 MG/DL — SIGNIFICANT CHANGE UP (ref 70–99)
GLUCOSE BLDC GLUCOMTR-MCNC: 99 MG/DL — SIGNIFICANT CHANGE UP (ref 70–99)
GLUCOSE SERPL-MCNC: 119 MG/DL — HIGH (ref 70–99)
GLUCOSE SERPL-MCNC: 134 MG/DL — HIGH (ref 70–99)
GLUCOSE SERPL-MCNC: 138 MG/DL — HIGH (ref 70–99)
GLUCOSE SERPL-MCNC: 142 MG/DL — HIGH (ref 70–99)
GLUCOSE SERPL-MCNC: 162 MG/DL — HIGH (ref 70–99)
GLUCOSE SERPL-MCNC: 177 MG/DL — HIGH (ref 70–99)
GLUCOSE SERPL-MCNC: 184 MG/DL — HIGH (ref 70–99)
GLUCOSE SERPL-MCNC: 189 MG/DL — HIGH (ref 70–99)
GLUCOSE SERPL-MCNC: 195 MG/DL — HIGH (ref 70–99)
GLUCOSE SERPL-MCNC: 196 MG/DL — HIGH (ref 70–99)
GLUCOSE SERPL-MCNC: 198 MG/DL — HIGH (ref 70–99)
GLUCOSE SERPL-MCNC: 206 MG/DL — HIGH (ref 70–99)
GLUCOSE SERPL-MCNC: 209 MG/DL — HIGH (ref 70–99)
GLUCOSE SERPL-MCNC: 215 MG/DL — HIGH (ref 70–99)
GLUCOSE SERPL-MCNC: 224 MG/DL — HIGH (ref 70–99)
GLUCOSE SERPL-MCNC: 233 MG/DL — HIGH (ref 70–99)
GLUCOSE SERPL-MCNC: 243 MG/DL — HIGH (ref 70–99)
GLUCOSE SERPL-MCNC: 245 MG/DL — HIGH (ref 70–99)
GLUCOSE SERPL-MCNC: 269 MG/DL — HIGH (ref 70–99)
GLUCOSE SERPL-MCNC: 276 MG/DL — HIGH (ref 70–99)
GLUCOSE SERPL-MCNC: 310 MG/DL — HIGH (ref 70–99)
GLUCOSE SERPL-MCNC: 332 MG/DL — HIGH (ref 70–99)
GLUCOSE SERPL-MCNC: 338 MG/DL — HIGH (ref 70–99)
GLUCOSE SERPL-MCNC: 357 MG/DL — HIGH (ref 70–99)
GLUCOSE SERPL-MCNC: 364 MG/DL — HIGH (ref 70–99)
GLUCOSE SERPL-MCNC: 420 MG/DL — HIGH (ref 70–99)
GLUCOSE SERPL-MCNC: 422 MG/DL — HIGH (ref 70–99)
GLUCOSE SERPL-MCNC: 457 MG/DL — CRITICAL HIGH (ref 70–99)
GLUCOSE SERPL-MCNC: 74 MG/DL — SIGNIFICANT CHANGE UP (ref 70–99)
GLUCOSE SERPL-MCNC: 81 MG/DL — SIGNIFICANT CHANGE UP (ref 70–99)
GLUCOSE SERPL-MCNC: 83 MG/DL — SIGNIFICANT CHANGE UP (ref 70–99)
GLUCOSE SERPL-MCNC: 86 MG/DL — SIGNIFICANT CHANGE UP (ref 70–99)
GLUCOSE UR QL: ABNORMAL
GLUCOSE UR QL: ABNORMAL
HBA1C BLD-MCNC: 10.4 % — HIGH (ref 4–5.6)
HCO3 BLDA-SCNC: 27 MMOL/L — SIGNIFICANT CHANGE UP (ref 23–27)
HCO3 BLDV-SCNC: 19 MMOL/L — LOW (ref 22–29)
HCO3 BLDV-SCNC: 21 MMOL/L — LOW (ref 22–29)
HCT VFR BLD CALC: 19.3 % — LOW (ref 37–47)
HCT VFR BLD CALC: 26 % — LOW (ref 37–47)
HCT VFR BLD CALC: 29.4 % — LOW (ref 37–47)
HCT VFR BLD CALC: 30.3 % — LOW (ref 37–47)
HCT VFR BLD CALC: 30.3 % — LOW (ref 37–47)
HCT VFR BLD CALC: 31 % — LOW (ref 37–47)
HCT VFR BLD CALC: 32.7 % — LOW (ref 37–47)
HCT VFR BLD CALC: 33.1 % — LOW (ref 37–47)
HCT VFR BLD CALC: 33.3 % — LOW (ref 37–47)
HCT VFR BLD CALC: 33.3 % — LOW (ref 37–47)
HCT VFR BLD CALC: 33.5 % — LOW (ref 37–47)
HCT VFR BLD CALC: 33.7 % — LOW (ref 37–47)
HCT VFR BLD CALC: 33.8 % — LOW (ref 37–47)
HCT VFR BLD CALC: 34.4 % — LOW (ref 37–47)
HCT VFR BLD CALC: 34.5 % — LOW (ref 37–47)
HCT VFR BLD CALC: 34.7 % — LOW (ref 37–47)
HCT VFR BLD CALC: 35.5 % — LOW (ref 37–47)
HCT VFR BLD CALC: 36.1 % — LOW (ref 37–47)
HCT VFR BLD CALC: 36.5 % — LOW (ref 37–47)
HCT VFR BLD CALC: 37.7 % — SIGNIFICANT CHANGE UP (ref 37–47)
HCT VFR BLD CALC: 38.7 % — SIGNIFICANT CHANGE UP (ref 37–47)
HCT VFR BLD CALC: 38.8 % — SIGNIFICANT CHANGE UP (ref 37–47)
HCT VFR BLD CALC: 39 % — SIGNIFICANT CHANGE UP (ref 37–47)
HCT VFR BLD CALC: 39.5 % — SIGNIFICANT CHANGE UP (ref 37–47)
HCT VFR BLD CALC: 41.7 % — SIGNIFICANT CHANGE UP (ref 37–47)
HCT VFR BLDA CALC: 39.7 % — SIGNIFICANT CHANGE UP (ref 34–44)
HCT VFR BLDA CALC: 45.1 % — HIGH (ref 34–44)
HGB BLD CALC-MCNC: 12.9 G/DL — LOW (ref 14–18)
HGB BLD CALC-MCNC: 14.7 G/DL — SIGNIFICANT CHANGE UP (ref 14–18)
HGB BLD-MCNC: 10.3 G/DL — LOW (ref 12–16)
HGB BLD-MCNC: 10.4 G/DL — LOW (ref 12–16)
HGB BLD-MCNC: 10.5 G/DL — LOW (ref 12–16)
HGB BLD-MCNC: 10.6 G/DL — LOW (ref 12–16)
HGB BLD-MCNC: 10.6 G/DL — LOW (ref 12–16)
HGB BLD-MCNC: 10.7 G/DL — LOW (ref 12–16)
HGB BLD-MCNC: 10.7 G/DL — LOW (ref 12–16)
HGB BLD-MCNC: 10.8 G/DL — LOW (ref 12–16)
HGB BLD-MCNC: 10.8 G/DL — LOW (ref 12–16)
HGB BLD-MCNC: 10.9 G/DL — LOW (ref 12–16)
HGB BLD-MCNC: 11 G/DL — LOW (ref 12–16)
HGB BLD-MCNC: 11.2 G/DL — LOW (ref 12–16)
HGB BLD-MCNC: 11.4 G/DL — LOW (ref 12–16)
HGB BLD-MCNC: 11.4 G/DL — LOW (ref 12–16)
HGB BLD-MCNC: 11.5 G/DL — LOW (ref 12–16)
HGB BLD-MCNC: 11.8 G/DL — LOW (ref 12–16)
HGB BLD-MCNC: 12 G/DL — SIGNIFICANT CHANGE UP (ref 12–16)
HGB BLD-MCNC: 12.5 G/DL — SIGNIFICANT CHANGE UP (ref 12–16)
HGB BLD-MCNC: 12.5 G/DL — SIGNIFICANT CHANGE UP (ref 12–16)
HGB BLD-MCNC: 12.6 G/DL — SIGNIFICANT CHANGE UP (ref 12–16)
HGB BLD-MCNC: 12.9 G/DL — SIGNIFICANT CHANGE UP (ref 12–16)
HGB BLD-MCNC: 13.6 G/DL — SIGNIFICANT CHANGE UP (ref 12–16)
HGB BLD-MCNC: 6.3 G/DL — CRITICAL LOW (ref 12–16)
HGB BLD-MCNC: 8.4 G/DL — LOW (ref 12–16)
HGB BLD-MCNC: 9.5 G/DL — LOW (ref 12–16)
HYALINE CASTS # UR AUTO: 0 /LPF — SIGNIFICANT CHANGE UP (ref 0–7)
HYALINE CASTS # UR AUTO: 33 /LPF — HIGH (ref 0–7)
IMM GRANULOCYTES NFR BLD AUTO: 2 % — HIGH (ref 0.1–0.3)
IMM GRANULOCYTES NFR BLD AUTO: 2.3 % — HIGH (ref 0.1–0.3)
IMM GRANULOCYTES NFR BLD AUTO: 2.5 % — HIGH (ref 0.1–0.3)
IMM GRANULOCYTES NFR BLD AUTO: 3.4 % — HIGH (ref 0.1–0.3)
IMM GRANULOCYTES NFR BLD AUTO: 3.4 % — HIGH (ref 0.1–0.3)
IMM GRANULOCYTES NFR BLD AUTO: 3.6 % — HIGH (ref 0.1–0.3)
IMM GRANULOCYTES NFR BLD AUTO: 4.3 % — HIGH (ref 0.1–0.3)
IMM GRANULOCYTES NFR BLD AUTO: 4.3 % — HIGH (ref 0.1–0.3)
IMM GRANULOCYTES NFR BLD AUTO: 4.7 % — HIGH (ref 0.1–0.3)
IMM GRANULOCYTES NFR BLD AUTO: 4.7 % — HIGH (ref 0.1–0.3)
IMM GRANULOCYTES NFR BLD AUTO: 5.2 % — HIGH (ref 0.1–0.3)
IMM GRANULOCYTES NFR BLD AUTO: 5.5 % — HIGH (ref 0.1–0.3)
IMM GRANULOCYTES NFR BLD AUTO: 5.9 % — HIGH (ref 0.1–0.3)
IMM GRANULOCYTES NFR BLD AUTO: 5.9 % — HIGH (ref 0.1–0.3)
INR BLD: 0.85 RATIO — SIGNIFICANT CHANGE UP (ref 0.65–1.3)
INR BLD: 0.89 RATIO — SIGNIFICANT CHANGE UP (ref 0.65–1.3)
INR BLD: 0.94 RATIO — SIGNIFICANT CHANGE UP (ref 0.65–1.3)
INR BLD: 0.95 RATIO — SIGNIFICANT CHANGE UP (ref 0.65–1.3)
INTERPRETATION SERPL IFE-IMP: SIGNIFICANT CHANGE UP
IRON SATN MFR SERPL: 16 % — SIGNIFICANT CHANGE UP (ref 15–50)
IRON SATN MFR SERPL: 35 UG/DL — SIGNIFICANT CHANGE UP (ref 35–150)
KETONES UR-MCNC: NEGATIVE — SIGNIFICANT CHANGE UP
KETONES UR-MCNC: SIGNIFICANT CHANGE UP
LACTATE BLDV-MCNC: 1.3 MMOL/L — SIGNIFICANT CHANGE UP (ref 0.5–1.6)
LACTATE BLDV-MCNC: 2.5 MMOL/L — HIGH (ref 0.5–1.6)
LACTATE SERPL-SCNC: 1.7 MMOL/L — SIGNIFICANT CHANGE UP (ref 0.5–2.2)
LACTATE SERPL-SCNC: 2.2 MMOL/L — SIGNIFICANT CHANGE UP (ref 0.5–2.2)
LACTATE SERPL-SCNC: 3.3 MMOL/L — HIGH (ref 0.5–2.2)
LEUKOCYTE ESTERASE UR-ACNC: ABNORMAL
LEUKOCYTE ESTERASE UR-ACNC: NEGATIVE — SIGNIFICANT CHANGE UP
LYMPHOCYTES # BLD AUTO: 0 % — LOW (ref 20.5–51.1)
LYMPHOCYTES # BLD AUTO: 0 K/UL — LOW (ref 1.2–3.4)
LYMPHOCYTES # BLD AUTO: 0.4 K/UL — LOW (ref 1.2–3.4)
LYMPHOCYTES # BLD AUTO: 0.43 K/UL — LOW (ref 1.2–3.4)
LYMPHOCYTES # BLD AUTO: 0.56 K/UL — LOW (ref 1.2–3.4)
LYMPHOCYTES # BLD AUTO: 0.64 K/UL — LOW (ref 1.2–3.4)
LYMPHOCYTES # BLD AUTO: 0.67 K/UL — LOW (ref 1.2–3.4)
LYMPHOCYTES # BLD AUTO: 0.7 K/UL — LOW (ref 1.2–3.4)
LYMPHOCYTES # BLD AUTO: 0.72 K/UL — LOW (ref 1.2–3.4)
LYMPHOCYTES # BLD AUTO: 0.74 K/UL — LOW (ref 1.2–3.4)
LYMPHOCYTES # BLD AUTO: 0.8 K/UL — LOW (ref 1.2–3.4)
LYMPHOCYTES # BLD AUTO: 0.83 K/UL — LOW (ref 1.2–3.4)
LYMPHOCYTES # BLD AUTO: 0.87 K/UL — LOW (ref 1.2–3.4)
LYMPHOCYTES # BLD AUTO: 0.92 K/UL — LOW (ref 1.2–3.4)
LYMPHOCYTES # BLD AUTO: 0.93 K/UL — LOW (ref 1.2–3.4)
LYMPHOCYTES # BLD AUTO: 0.95 K/UL — LOW (ref 1.2–3.4)
LYMPHOCYTES # BLD AUTO: 0.96 K/UL — LOW (ref 1.2–3.4)
LYMPHOCYTES # BLD AUTO: 1.12 K/UL — LOW (ref 1.2–3.4)
LYMPHOCYTES # BLD AUTO: 1.3 K/UL — SIGNIFICANT CHANGE UP (ref 1.2–3.4)
LYMPHOCYTES # BLD AUTO: 10.6 % — LOW (ref 20.5–51.1)
LYMPHOCYTES # BLD AUTO: 2.8 % — LOW (ref 20.5–51.1)
LYMPHOCYTES # BLD AUTO: 3.5 % — LOW (ref 20.5–51.1)
LYMPHOCYTES # BLD AUTO: 3.7 % — LOW (ref 20.5–51.1)
LYMPHOCYTES # BLD AUTO: 4.1 % — LOW (ref 20.5–51.1)
LYMPHOCYTES # BLD AUTO: 4.4 % — LOW (ref 20.5–51.1)
LYMPHOCYTES # BLD AUTO: 4.5 % — LOW (ref 20.5–51.1)
LYMPHOCYTES # BLD AUTO: 4.8 % — LOW (ref 20.5–51.1)
LYMPHOCYTES # BLD AUTO: 6 % — LOW (ref 20.5–51.1)
LYMPHOCYTES # BLD AUTO: 6.1 % — LOW (ref 20.5–51.1)
LYMPHOCYTES # BLD AUTO: 6.4 % — LOW (ref 20.5–51.1)
LYMPHOCYTES # BLD AUTO: 7 % — LOW (ref 20.5–51.1)
LYMPHOCYTES # BLD AUTO: 7.2 % — LOW (ref 20.5–51.1)
LYMPHOCYTES # BLD AUTO: 8.2 % — LOW (ref 20.5–51.1)
LYMPHOCYTES # BLD AUTO: 8.2 % — LOW (ref 20.5–51.1)
MACROCYTES BLD QL: SLIGHT — SIGNIFICANT CHANGE UP
MAGNESIUM SERPL-MCNC: 1.7 MG/DL — LOW (ref 1.8–2.4)
MAGNESIUM SERPL-MCNC: 1.8 MG/DL — SIGNIFICANT CHANGE UP (ref 1.8–2.4)
MAGNESIUM SERPL-MCNC: 1.9 MG/DL — SIGNIFICANT CHANGE UP (ref 1.8–2.4)
MAGNESIUM SERPL-MCNC: 2 MG/DL — SIGNIFICANT CHANGE UP (ref 1.8–2.4)
MAGNESIUM SERPL-MCNC: 2.1 MG/DL — SIGNIFICANT CHANGE UP (ref 1.8–2.4)
MAGNESIUM SERPL-MCNC: 2.1 MG/DL — SIGNIFICANT CHANGE UP (ref 1.8–2.4)
MAGNESIUM SERPL-MCNC: 2.2 MG/DL — SIGNIFICANT CHANGE UP (ref 1.8–2.4)
MANUAL SMEAR VERIFICATION: SIGNIFICANT CHANGE UP
MCHC RBC-ENTMCNC: 29 PG — SIGNIFICANT CHANGE UP (ref 27–31)
MCHC RBC-ENTMCNC: 29.1 PG — SIGNIFICANT CHANGE UP (ref 27–31)
MCHC RBC-ENTMCNC: 29.3 PG — SIGNIFICANT CHANGE UP (ref 27–31)
MCHC RBC-ENTMCNC: 29.5 PG — SIGNIFICANT CHANGE UP (ref 27–31)
MCHC RBC-ENTMCNC: 29.6 PG — SIGNIFICANT CHANGE UP (ref 27–31)
MCHC RBC-ENTMCNC: 29.7 PG — SIGNIFICANT CHANGE UP (ref 27–31)
MCHC RBC-ENTMCNC: 29.8 PG — SIGNIFICANT CHANGE UP (ref 27–31)
MCHC RBC-ENTMCNC: 29.9 PG — SIGNIFICANT CHANGE UP (ref 27–31)
MCHC RBC-ENTMCNC: 29.9 PG — SIGNIFICANT CHANGE UP (ref 27–31)
MCHC RBC-ENTMCNC: 30 PG — SIGNIFICANT CHANGE UP (ref 27–31)
MCHC RBC-ENTMCNC: 30.1 PG — SIGNIFICANT CHANGE UP (ref 27–31)
MCHC RBC-ENTMCNC: 31.2 G/DL — LOW (ref 32–37)
MCHC RBC-ENTMCNC: 31.4 G/DL — LOW (ref 32–37)
MCHC RBC-ENTMCNC: 31.7 G/DL — LOW (ref 32–37)
MCHC RBC-ENTMCNC: 31.8 G/DL — LOW (ref 32–37)
MCHC RBC-ENTMCNC: 31.9 G/DL — LOW (ref 32–37)
MCHC RBC-ENTMCNC: 31.9 G/DL — LOW (ref 32–37)
MCHC RBC-ENTMCNC: 32 G/DL — SIGNIFICANT CHANGE UP (ref 32–37)
MCHC RBC-ENTMCNC: 32.1 G/DL — SIGNIFICANT CHANGE UP (ref 32–37)
MCHC RBC-ENTMCNC: 32.2 G/DL — SIGNIFICANT CHANGE UP (ref 32–37)
MCHC RBC-ENTMCNC: 32.3 G/DL — SIGNIFICANT CHANGE UP (ref 32–37)
MCHC RBC-ENTMCNC: 32.3 G/DL — SIGNIFICANT CHANGE UP (ref 32–37)
MCHC RBC-ENTMCNC: 32.5 G/DL — SIGNIFICANT CHANGE UP (ref 32–37)
MCHC RBC-ENTMCNC: 32.6 G/DL — SIGNIFICANT CHANGE UP (ref 32–37)
MCHC RBC-ENTMCNC: 32.7 G/DL — SIGNIFICANT CHANGE UP (ref 32–37)
MCHC RBC-ENTMCNC: 32.7 G/DL — SIGNIFICANT CHANGE UP (ref 32–37)
MCHC RBC-ENTMCNC: 33 G/DL — SIGNIFICANT CHANGE UP (ref 32–37)
MCHC RBC-ENTMCNC: 33.2 G/DL — SIGNIFICANT CHANGE UP (ref 32–37)
MCHC RBC-ENTMCNC: 33.7 G/DL — SIGNIFICANT CHANGE UP (ref 32–37)
MCHC RBC-ENTMCNC: 33.9 G/DL — SIGNIFICANT CHANGE UP (ref 32–37)
MCHC RBC-ENTMCNC: 34 G/DL — SIGNIFICANT CHANGE UP (ref 32–37)
MCHC RBC-ENTMCNC: 34.3 G/DL — SIGNIFICANT CHANGE UP (ref 32–37)
MCV RBC AUTO: 85.8 FL — SIGNIFICANT CHANGE UP (ref 81–99)
MCV RBC AUTO: 86.3 FL — SIGNIFICANT CHANGE UP (ref 81–99)
MCV RBC AUTO: 87.1 FL — SIGNIFICANT CHANGE UP (ref 81–99)
MCV RBC AUTO: 88.1 FL — SIGNIFICANT CHANGE UP (ref 81–99)
MCV RBC AUTO: 88.3 FL — SIGNIFICANT CHANGE UP (ref 81–99)
MCV RBC AUTO: 88.8 FL — SIGNIFICANT CHANGE UP (ref 81–99)
MCV RBC AUTO: 88.9 FL — SIGNIFICANT CHANGE UP (ref 81–99)
MCV RBC AUTO: 90.8 FL — SIGNIFICANT CHANGE UP (ref 81–99)
MCV RBC AUTO: 91.3 FL — SIGNIFICANT CHANGE UP (ref 81–99)
MCV RBC AUTO: 91.4 FL — SIGNIFICANT CHANGE UP (ref 81–99)
MCV RBC AUTO: 91.7 FL — SIGNIFICANT CHANGE UP (ref 81–99)
MCV RBC AUTO: 91.9 FL — SIGNIFICANT CHANGE UP (ref 81–99)
MCV RBC AUTO: 92 FL — SIGNIFICANT CHANGE UP (ref 81–99)
MCV RBC AUTO: 92.3 FL — SIGNIFICANT CHANGE UP (ref 81–99)
MCV RBC AUTO: 92.5 FL — SIGNIFICANT CHANGE UP (ref 81–99)
MCV RBC AUTO: 92.6 FL — SIGNIFICANT CHANGE UP (ref 81–99)
MCV RBC AUTO: 92.9 FL — SIGNIFICANT CHANGE UP (ref 81–99)
MCV RBC AUTO: 93 FL — SIGNIFICANT CHANGE UP (ref 81–99)
MCV RBC AUTO: 94.3 FL — SIGNIFICANT CHANGE UP (ref 81–99)
METHOD TYPE: SIGNIFICANT CHANGE UP
MICROCYTES BLD QL: SLIGHT — SIGNIFICANT CHANGE UP
MONOCYTES # BLD AUTO: 0.21 K/UL — SIGNIFICANT CHANGE UP (ref 0.1–0.6)
MONOCYTES # BLD AUTO: 0.24 K/UL — SIGNIFICANT CHANGE UP (ref 0.1–0.6)
MONOCYTES # BLD AUTO: 0.29 K/UL — SIGNIFICANT CHANGE UP (ref 0.1–0.6)
MONOCYTES # BLD AUTO: 0.32 K/UL — SIGNIFICANT CHANGE UP (ref 0.1–0.6)
MONOCYTES # BLD AUTO: 0.34 K/UL — SIGNIFICANT CHANGE UP (ref 0.1–0.6)
MONOCYTES # BLD AUTO: 0.34 K/UL — SIGNIFICANT CHANGE UP (ref 0.1–0.6)
MONOCYTES # BLD AUTO: 0.35 K/UL — SIGNIFICANT CHANGE UP (ref 0.1–0.6)
MONOCYTES # BLD AUTO: 0.39 K/UL — SIGNIFICANT CHANGE UP (ref 0.1–0.6)
MONOCYTES # BLD AUTO: 0.39 K/UL — SIGNIFICANT CHANGE UP (ref 0.1–0.6)
MONOCYTES # BLD AUTO: 0.59 K/UL — SIGNIFICANT CHANGE UP (ref 0.1–0.6)
MONOCYTES # BLD AUTO: 0.6 K/UL — SIGNIFICANT CHANGE UP (ref 0.1–0.6)
MONOCYTES # BLD AUTO: 0.68 K/UL — HIGH (ref 0.1–0.6)
MONOCYTES # BLD AUTO: 0.68 K/UL — HIGH (ref 0.1–0.6)
MONOCYTES # BLD AUTO: 0.7 K/UL — HIGH (ref 0.1–0.6)
MONOCYTES # BLD AUTO: 0.71 K/UL — HIGH (ref 0.1–0.6)
MONOCYTES # BLD AUTO: 0.75 K/UL — HIGH (ref 0.1–0.6)
MONOCYTES # BLD AUTO: 0.95 K/UL — HIGH (ref 0.1–0.6)
MONOCYTES # BLD AUTO: 2.01 K/UL — HIGH (ref 0.1–0.6)
MONOCYTES NFR BLD AUTO: 1.6 % — LOW (ref 1.7–9.3)
MONOCYTES NFR BLD AUTO: 1.7 % — SIGNIFICANT CHANGE UP (ref 1.7–9.3)
MONOCYTES NFR BLD AUTO: 1.7 % — SIGNIFICANT CHANGE UP (ref 1.7–9.3)
MONOCYTES NFR BLD AUTO: 1.8 % — SIGNIFICANT CHANGE UP (ref 1.7–9.3)
MONOCYTES NFR BLD AUTO: 12.3 % — HIGH (ref 1.7–9.3)
MONOCYTES NFR BLD AUTO: 2.1 % — SIGNIFICANT CHANGE UP (ref 1.7–9.3)
MONOCYTES NFR BLD AUTO: 2.2 % — SIGNIFICANT CHANGE UP (ref 1.7–9.3)
MONOCYTES NFR BLD AUTO: 2.3 % — SIGNIFICANT CHANGE UP (ref 1.7–9.3)
MONOCYTES NFR BLD AUTO: 2.6 % — SIGNIFICANT CHANGE UP (ref 1.7–9.3)
MONOCYTES NFR BLD AUTO: 2.7 % — SIGNIFICANT CHANGE UP (ref 1.7–9.3)
MONOCYTES NFR BLD AUTO: 3.5 % — SIGNIFICANT CHANGE UP (ref 1.7–9.3)
MONOCYTES NFR BLD AUTO: 3.8 % — SIGNIFICANT CHANGE UP (ref 1.7–9.3)
MONOCYTES NFR BLD AUTO: 4.7 % — SIGNIFICANT CHANGE UP (ref 1.7–9.3)
MONOCYTES NFR BLD AUTO: 4.7 % — SIGNIFICANT CHANGE UP (ref 1.7–9.3)
MONOCYTES NFR BLD AUTO: 5.3 % — SIGNIFICANT CHANGE UP (ref 1.7–9.3)
MONOCYTES NFR BLD AUTO: 5.4 % — SIGNIFICANT CHANGE UP (ref 1.7–9.3)
MONOCYTES NFR BLD AUTO: 6.3 % — SIGNIFICANT CHANGE UP (ref 1.7–9.3)
MONOCYTES NFR BLD AUTO: 6.7 % — SIGNIFICANT CHANGE UP (ref 1.7–9.3)
MYELOCYTES NFR BLD: 0.9 % — HIGH (ref 0–0)
NEUTROPHILS # BLD AUTO: 10.19 K/UL — HIGH (ref 1.4–6.5)
NEUTROPHILS # BLD AUTO: 10.26 K/UL — HIGH (ref 1.4–6.5)
NEUTROPHILS # BLD AUTO: 10.4 K/UL — HIGH (ref 1.4–6.5)
NEUTROPHILS # BLD AUTO: 10.53 K/UL — HIGH (ref 1.4–6.5)
NEUTROPHILS # BLD AUTO: 12.59 K/UL — HIGH (ref 1.4–6.5)
NEUTROPHILS # BLD AUTO: 12.74 K/UL — HIGH (ref 1.4–6.5)
NEUTROPHILS # BLD AUTO: 12.78 K/UL — HIGH (ref 1.4–6.5)
NEUTROPHILS # BLD AUTO: 13.47 K/UL — HIGH (ref 1.4–6.5)
NEUTROPHILS # BLD AUTO: 13.61 K/UL — HIGH (ref 1.4–6.5)
NEUTROPHILS # BLD AUTO: 13.64 K/UL — HIGH (ref 1.4–6.5)
NEUTROPHILS # BLD AUTO: 13.69 K/UL — HIGH (ref 1.4–6.5)
NEUTROPHILS # BLD AUTO: 13.97 K/UL — HIGH (ref 1.4–6.5)
NEUTROPHILS # BLD AUTO: 16.34 K/UL — HIGH (ref 1.4–6.5)
NEUTROPHILS # BLD AUTO: 17.08 K/UL — HIGH (ref 1.4–6.5)
NEUTROPHILS # BLD AUTO: 19.4 K/UL — HIGH (ref 1.4–6.5)
NEUTROPHILS # BLD AUTO: 21.25 K/UL — HIGH (ref 1.4–6.5)
NEUTROPHILS # BLD AUTO: 7.77 K/UL — HIGH (ref 1.4–6.5)
NEUTROPHILS # BLD AUTO: 8.83 K/UL — HIGH (ref 1.4–6.5)
NEUTROPHILS NFR BLD AUTO: 77 % — HIGH (ref 42.2–75.2)
NEUTROPHILS NFR BLD AUTO: 77.9 % — HIGH (ref 42.2–75.2)
NEUTROPHILS NFR BLD AUTO: 77.9 % — HIGH (ref 42.2–75.2)
NEUTROPHILS NFR BLD AUTO: 79.9 % — HIGH (ref 42.2–75.2)
NEUTROPHILS NFR BLD AUTO: 80.7 % — HIGH (ref 42.2–75.2)
NEUTROPHILS NFR BLD AUTO: 81.7 % — HIGH (ref 42.2–75.2)
NEUTROPHILS NFR BLD AUTO: 83.6 % — HIGH (ref 42.2–75.2)
NEUTROPHILS NFR BLD AUTO: 84.4 % — HIGH (ref 42.2–75.2)
NEUTROPHILS NFR BLD AUTO: 85 % — HIGH (ref 42.2–75.2)
NEUTROPHILS NFR BLD AUTO: 86.8 % — HIGH (ref 42.2–75.2)
NEUTROPHILS NFR BLD AUTO: 87.8 % — HIGH (ref 42.2–75.2)
NEUTROPHILS NFR BLD AUTO: 88 % — HIGH (ref 42.2–75.2)
NEUTROPHILS NFR BLD AUTO: 88.5 % — HIGH (ref 42.2–75.2)
NEUTROPHILS NFR BLD AUTO: 89.2 % — HIGH (ref 42.2–75.2)
NEUTROPHILS NFR BLD AUTO: 89.3 % — HIGH (ref 42.2–75.2)
NEUTROPHILS NFR BLD AUTO: 90.3 % — HIGH (ref 42.2–75.2)
NEUTROPHILS NFR BLD AUTO: 90.9 % — HIGH (ref 42.2–75.2)
NEUTROPHILS NFR BLD AUTO: 97.4 % — HIGH (ref 42.2–75.2)
NEUTS BAND # BLD: 11.3 % — HIGH (ref 0–6)
NEUTS BAND # BLD: 2.6 % — SIGNIFICANT CHANGE UP (ref 0–6)
NEUTS BAND # BLD: 7.1 % — HIGH (ref 0–6)
NITRITE UR-MCNC: NEGATIVE — SIGNIFICANT CHANGE UP
NITRITE UR-MCNC: POSITIVE
NRBC # BLD: 0 /100 WBCS — SIGNIFICANT CHANGE UP (ref 0–0)
NRBC # BLD: 1 /100 — HIGH (ref 0–0)
NRBC # BLD: 4 /100 WBCS — HIGH (ref 0–0)
NRBC # BLD: SIGNIFICANT CHANGE UP /100 WBCS (ref 0–0)
NT-PROBNP SERPL-SCNC: 3503 PG/ML — HIGH (ref 0–300)
ORGANISM # SPEC MICROSCOPIC CNT: SIGNIFICANT CHANGE UP
PCO2 BLDA: 78 MMHG — CRITICAL HIGH (ref 38–42)
PCO2 BLDV: 35 MMHG — LOW (ref 41–51)
PCO2 BLDV: 49 MMHG — SIGNIFICANT CHANGE UP (ref 41–51)
PH BLDA: 7.15 — CRITICAL LOW (ref 7.38–7.42)
PH BLDV: 7.24 — LOW (ref 7.26–7.43)
PH BLDV: 7.34 — SIGNIFICANT CHANGE UP (ref 7.26–7.43)
PH UR: 6 — SIGNIFICANT CHANGE UP (ref 5–8)
PH UR: 6.5 — SIGNIFICANT CHANGE UP (ref 5–8)
PHOSPHATE SERPL-MCNC: 3.5 MG/DL — SIGNIFICANT CHANGE UP (ref 2.1–4.9)
PHOSPHATE SERPL-MCNC: 3.7 MG/DL — SIGNIFICANT CHANGE UP (ref 2.1–4.9)
PHOSPHATE SERPL-MCNC: 4.4 MG/DL — SIGNIFICANT CHANGE UP (ref 2.1–4.9)
PHOSPHATE SERPL-MCNC: 4.5 MG/DL — SIGNIFICANT CHANGE UP (ref 2.1–4.9)
PLAT MORPH BLD: NORMAL — SIGNIFICANT CHANGE UP
PLATELET # BLD AUTO: 219 K/UL — SIGNIFICANT CHANGE UP (ref 130–400)
PLATELET # BLD AUTO: 230 K/UL — SIGNIFICANT CHANGE UP (ref 130–400)
PLATELET # BLD AUTO: 232 K/UL — SIGNIFICANT CHANGE UP (ref 130–400)
PLATELET # BLD AUTO: 238 K/UL — SIGNIFICANT CHANGE UP (ref 130–400)
PLATELET # BLD AUTO: 240 K/UL — SIGNIFICANT CHANGE UP (ref 130–400)
PLATELET # BLD AUTO: 245 K/UL — SIGNIFICANT CHANGE UP (ref 130–400)
PLATELET # BLD AUTO: 251 K/UL — SIGNIFICANT CHANGE UP (ref 130–400)
PLATELET # BLD AUTO: 261 K/UL — SIGNIFICANT CHANGE UP (ref 130–400)
PLATELET # BLD AUTO: 264 K/UL — SIGNIFICANT CHANGE UP (ref 130–400)
PLATELET # BLD AUTO: 272 K/UL — SIGNIFICANT CHANGE UP (ref 130–400)
PLATELET # BLD AUTO: 274 K/UL — SIGNIFICANT CHANGE UP (ref 130–400)
PLATELET # BLD AUTO: 277 K/UL — SIGNIFICANT CHANGE UP (ref 130–400)
PLATELET # BLD AUTO: 278 K/UL — SIGNIFICANT CHANGE UP (ref 130–400)
PLATELET # BLD AUTO: 280 K/UL — SIGNIFICANT CHANGE UP (ref 130–400)
PLATELET # BLD AUTO: 289 K/UL — SIGNIFICANT CHANGE UP (ref 130–400)
PLATELET # BLD AUTO: 303 K/UL — SIGNIFICANT CHANGE UP (ref 130–400)
PLATELET # BLD AUTO: 307 K/UL — SIGNIFICANT CHANGE UP (ref 130–400)
PLATELET # BLD AUTO: 308 K/UL — SIGNIFICANT CHANGE UP (ref 130–400)
PLATELET # BLD AUTO: 309 K/UL — SIGNIFICANT CHANGE UP (ref 130–400)
PLATELET # BLD AUTO: 316 K/UL — SIGNIFICANT CHANGE UP (ref 130–400)
PLATELET # BLD AUTO: 317 K/UL — SIGNIFICANT CHANGE UP (ref 130–400)
PLATELET # BLD AUTO: 321 K/UL — SIGNIFICANT CHANGE UP (ref 130–400)
PLATELET # BLD AUTO: 337 K/UL — SIGNIFICANT CHANGE UP (ref 130–400)
PLATELET # BLD AUTO: 337 K/UL — SIGNIFICANT CHANGE UP (ref 130–400)
PLATELET # BLD AUTO: 370 K/UL — SIGNIFICANT CHANGE UP (ref 130–400)
PO2 BLDA: 20 MMHG — CRITICAL LOW (ref 78–95)
PO2 BLDV: 29 MMHG — SIGNIFICANT CHANGE UP (ref 20–40)
PO2 BLDV: 96 MMHG — HIGH (ref 20–40)
POIKILOCYTOSIS BLD QL AUTO: SIGNIFICANT CHANGE UP
POIKILOCYTOSIS BLD QL AUTO: SLIGHT — SIGNIFICANT CHANGE UP
POIKILOCYTOSIS BLD QL AUTO: SLIGHT — SIGNIFICANT CHANGE UP
POLYCHROMASIA BLD QL SMEAR: SIGNIFICANT CHANGE UP
POTASSIUM BLDV-SCNC: 4.9 MMOL/L — SIGNIFICANT CHANGE UP (ref 3.3–5.6)
POTASSIUM BLDV-SCNC: 5.3 MMOL/L — SIGNIFICANT CHANGE UP (ref 3.3–5.6)
POTASSIUM SERPL-MCNC: 4.5 MMOL/L — SIGNIFICANT CHANGE UP (ref 3.5–5)
POTASSIUM SERPL-MCNC: 4.6 MMOL/L — SIGNIFICANT CHANGE UP (ref 3.5–5)
POTASSIUM SERPL-MCNC: 4.7 MMOL/L — SIGNIFICANT CHANGE UP (ref 3.5–5)
POTASSIUM SERPL-MCNC: 4.8 MMOL/L — SIGNIFICANT CHANGE UP (ref 3.5–5)
POTASSIUM SERPL-MCNC: 4.9 MMOL/L — SIGNIFICANT CHANGE UP (ref 3.5–5)
POTASSIUM SERPL-MCNC: 5 MMOL/L — SIGNIFICANT CHANGE UP (ref 3.5–5)
POTASSIUM SERPL-MCNC: 5 MMOL/L — SIGNIFICANT CHANGE UP (ref 3.5–5)
POTASSIUM SERPL-MCNC: 5.1 MMOL/L — HIGH (ref 3.5–5)
POTASSIUM SERPL-MCNC: 5.2 MMOL/L — HIGH (ref 3.5–5)
POTASSIUM SERPL-MCNC: 5.2 MMOL/L — HIGH (ref 3.5–5)
POTASSIUM SERPL-MCNC: 5.3 MMOL/L — HIGH (ref 3.5–5)
POTASSIUM SERPL-MCNC: 5.3 MMOL/L — HIGH (ref 3.5–5)
POTASSIUM SERPL-MCNC: 5.4 MMOL/L — HIGH (ref 3.5–5)
POTASSIUM SERPL-MCNC: 5.5 MMOL/L — HIGH (ref 3.5–5)
POTASSIUM SERPL-MCNC: 5.6 MMOL/L — HIGH (ref 3.5–5)
POTASSIUM SERPL-MCNC: 5.7 MMOL/L — HIGH (ref 3.5–5)
POTASSIUM SERPL-MCNC: 5.7 MMOL/L — HIGH (ref 3.5–5)
POTASSIUM SERPL-MCNC: 5.8 MMOL/L — HIGH (ref 3.5–5)
POTASSIUM SERPL-MCNC: 5.9 MMOL/L — HIGH (ref 3.5–5)
POTASSIUM SERPL-MCNC: 5.9 MMOL/L — HIGH (ref 3.5–5)
POTASSIUM SERPL-MCNC: 6 MMOL/L — CRITICAL HIGH (ref 3.5–5)
POTASSIUM SERPL-MCNC: 6.1 MMOL/L — CRITICAL HIGH (ref 3.5–5)
POTASSIUM SERPL-MCNC: 6.2 MMOL/L — CRITICAL HIGH (ref 3.5–5)
POTASSIUM SERPL-MCNC: 6.4 MMOL/L — CRITICAL HIGH (ref 3.5–5)
POTASSIUM SERPL-SCNC: 4.5 MMOL/L — SIGNIFICANT CHANGE UP (ref 3.5–5)
POTASSIUM SERPL-SCNC: 4.6 MMOL/L — SIGNIFICANT CHANGE UP (ref 3.5–5)
POTASSIUM SERPL-SCNC: 4.7 MMOL/L — SIGNIFICANT CHANGE UP (ref 3.5–5)
POTASSIUM SERPL-SCNC: 4.8 MMOL/L — SIGNIFICANT CHANGE UP (ref 3.5–5)
POTASSIUM SERPL-SCNC: 4.9 MMOL/L — SIGNIFICANT CHANGE UP (ref 3.5–5)
POTASSIUM SERPL-SCNC: 5 MMOL/L — SIGNIFICANT CHANGE UP (ref 3.5–5)
POTASSIUM SERPL-SCNC: 5 MMOL/L — SIGNIFICANT CHANGE UP (ref 3.5–5)
POTASSIUM SERPL-SCNC: 5.1 MMOL/L — HIGH (ref 3.5–5)
POTASSIUM SERPL-SCNC: 5.2 MMOL/L — HIGH (ref 3.5–5)
POTASSIUM SERPL-SCNC: 5.2 MMOL/L — HIGH (ref 3.5–5)
POTASSIUM SERPL-SCNC: 5.3 MMOL/L — HIGH (ref 3.5–5)
POTASSIUM SERPL-SCNC: 5.3 MMOL/L — HIGH (ref 3.5–5)
POTASSIUM SERPL-SCNC: 5.4 MMOL/L — HIGH (ref 3.5–5)
POTASSIUM SERPL-SCNC: 5.5 MMOL/L — HIGH (ref 3.5–5)
POTASSIUM SERPL-SCNC: 5.6 MMOL/L — HIGH (ref 3.5–5)
POTASSIUM SERPL-SCNC: 5.7 MMOL/L — HIGH (ref 3.5–5)
POTASSIUM SERPL-SCNC: 5.7 MMOL/L — HIGH (ref 3.5–5)
POTASSIUM SERPL-SCNC: 5.8 MMOL/L — HIGH (ref 3.5–5)
POTASSIUM SERPL-SCNC: 5.9 MMOL/L — HIGH (ref 3.5–5)
POTASSIUM SERPL-SCNC: 5.9 MMOL/L — HIGH (ref 3.5–5)
POTASSIUM SERPL-SCNC: 6 MMOL/L — CRITICAL HIGH (ref 3.5–5)
POTASSIUM SERPL-SCNC: 6.1 MMOL/L — CRITICAL HIGH (ref 3.5–5)
POTASSIUM SERPL-SCNC: 6.2 MMOL/L — CRITICAL HIGH (ref 3.5–5)
POTASSIUM SERPL-SCNC: 6.4 MMOL/L — CRITICAL HIGH (ref 3.5–5)
POTASSIUM UR-SCNC: 20 MMOL/L — SIGNIFICANT CHANGE UP
PROMYELOCYTES # FLD: 0.9 % — HIGH (ref 0–0)
PROT PATTERN SERPL ELPH-IMP: SIGNIFICANT CHANGE UP
PROT SERPL-MCNC: 3.5 G/DL — LOW (ref 6–8)
PROT SERPL-MCNC: 3.6 G/DL — LOW (ref 6–8)
PROT SERPL-MCNC: 3.7 G/DL — LOW (ref 6–8)
PROT SERPL-MCNC: 3.8 G/DL — LOW (ref 6–8)
PROT SERPL-MCNC: 3.8 G/DL — LOW (ref 6–8)
PROT SERPL-MCNC: 3.9 G/DL — LOW (ref 6–8)
PROT SERPL-MCNC: 3.9 G/DL — LOW (ref 6–8)
PROT SERPL-MCNC: 4 G/DL — LOW (ref 6–8)
PROT SERPL-MCNC: 4.1 G/DL — LOW (ref 6–8)
PROT SERPL-MCNC: 4.1 G/DL — LOW (ref 6–8)
PROT SERPL-MCNC: 4.1 G/DL — LOW (ref 6–8.3)
PROT SERPL-MCNC: 4.1 G/DL — LOW (ref 6–8.3)
PROT SERPL-MCNC: 4.2 G/DL — LOW (ref 6–8)
PROT SERPL-MCNC: 4.2 G/DL — LOW (ref 6–8)
PROT SERPL-MCNC: 4.3 G/DL — LOW (ref 6–8)
PROT SERPL-MCNC: 4.3 G/DL — LOW (ref 6–8)
PROT SERPL-MCNC: 4.4 G/DL — LOW (ref 6–8)
PROT SERPL-MCNC: 4.4 G/DL — LOW (ref 6–8)
PROT SERPL-MCNC: 4.6 G/DL — LOW (ref 6–8)
PROT SERPL-MCNC: 4.6 G/DL — LOW (ref 6–8)
PROT SERPL-MCNC: 4.8 G/DL — LOW (ref 6–8)
PROT SERPL-MCNC: 4.9 G/DL — LOW (ref 6–8)
PROT SERPL-MCNC: 5.2 G/DL — LOW (ref 6–8)
PROT SERPL-MCNC: 5.4 G/DL — LOW (ref 6–8)
PROT UR-MCNC: ABNORMAL
PROT UR-MCNC: ABNORMAL
PROTHROM AB SERPL-ACNC: 10.3 SEC — SIGNIFICANT CHANGE UP (ref 9.95–12.87)
PROTHROM AB SERPL-ACNC: 10.8 SEC — SIGNIFICANT CHANGE UP (ref 9.95–12.87)
PROTHROM AB SERPL-ACNC: 10.9 SEC — SIGNIFICANT CHANGE UP (ref 9.95–12.87)
PROTHROM AB SERPL-ACNC: 9.8 SEC — LOW (ref 9.95–12.87)
RBC # BLD: 2.09 M/UL — LOW (ref 4.2–5.4)
RBC # BLD: 2.81 M/UL — LOW (ref 4.2–5.4)
RBC # BLD: 3.2 M/UL — LOW (ref 4.2–5.4)
RBC # BLD: 3.51 M/UL — LOW (ref 4.2–5.4)
RBC # BLD: 3.53 M/UL — LOW (ref 4.2–5.4)
RBC # BLD: 3.56 M/UL — LOW (ref 4.2–5.4)
RBC # BLD: 3.58 M/UL — LOW (ref 4.2–5.4)
RBC # BLD: 3.62 M/UL — LOW (ref 4.2–5.4)
RBC # BLD: 3.63 M/UL — LOW (ref 4.2–5.4)
RBC # BLD: 3.65 M/UL — LOW (ref 4.2–5.4)
RBC # BLD: 3.68 M/UL — LOW (ref 4.2–5.4)
RBC # BLD: 3.72 M/UL — LOW (ref 4.2–5.4)
RBC # BLD: 3.75 M/UL — LOW (ref 4.2–5.4)
RBC # BLD: 3.75 M/UL — LOW (ref 4.2–5.4)
RBC # BLD: 3.78 M/UL — LOW (ref 4.2–5.4)
RBC # BLD: 3.83 M/UL — LOW (ref 4.2–5.4)
RBC # BLD: 3.87 M/UL — LOW (ref 4.2–5.4)
RBC # BLD: 3.9 M/UL — LOW (ref 4.2–5.4)
RBC # BLD: 4.03 M/UL — LOW (ref 4.2–5.4)
RBC # BLD: 4.06 M/UL — LOW (ref 4.2–5.4)
RBC # BLD: 4.21 M/UL — SIGNIFICANT CHANGE UP (ref 4.2–5.4)
RBC # BLD: 4.23 M/UL — SIGNIFICANT CHANGE UP (ref 4.2–5.4)
RBC # BLD: 4.24 M/UL — SIGNIFICANT CHANGE UP (ref 4.2–5.4)
RBC # BLD: 4.3 M/UL — SIGNIFICANT CHANGE UP (ref 4.2–5.4)
RBC # BLD: 4.59 M/UL — SIGNIFICANT CHANGE UP (ref 4.2–5.4)
RBC # FLD: 14.3 % — SIGNIFICANT CHANGE UP (ref 11.5–14.5)
RBC # FLD: 14.3 % — SIGNIFICANT CHANGE UP (ref 11.5–14.5)
RBC # FLD: 14.5 % — SIGNIFICANT CHANGE UP (ref 11.5–14.5)
RBC # FLD: 14.6 % — HIGH (ref 11.5–14.5)
RBC # FLD: 14.6 % — HIGH (ref 11.5–14.5)
RBC # FLD: 14.7 % — HIGH (ref 11.5–14.5)
RBC # FLD: 14.7 % — HIGH (ref 11.5–14.5)
RBC # FLD: 14.8 % — HIGH (ref 11.5–14.5)
RBC # FLD: 14.9 % — HIGH (ref 11.5–14.5)
RBC # FLD: 14.9 % — HIGH (ref 11.5–14.5)
RBC # FLD: 15.2 % — HIGH (ref 11.5–14.5)
RBC # FLD: 15.3 % — HIGH (ref 11.5–14.5)
RBC # FLD: 15.4 % — HIGH (ref 11.5–14.5)
RBC # FLD: 15.6 % — HIGH (ref 11.5–14.5)
RBC # FLD: 15.6 % — HIGH (ref 11.5–14.5)
RBC # FLD: 15.7 % — HIGH (ref 11.5–14.5)
RBC # FLD: 15.7 % — HIGH (ref 11.5–14.5)
RBC # FLD: 15.8 % — HIGH (ref 11.5–14.5)
RBC # FLD: 15.8 % — HIGH (ref 11.5–14.5)
RBC # FLD: 15.9 % — HIGH (ref 11.5–14.5)
RBC # FLD: 16.1 % — HIGH (ref 11.5–14.5)
RBC # FLD: 16.2 % — HIGH (ref 11.5–14.5)
RBC # FLD: 16.2 % — HIGH (ref 11.5–14.5)
RBC # FLD: 16.6 % — HIGH (ref 11.5–14.5)
RBC # FLD: 16.9 % — HIGH (ref 11.5–14.5)
RBC BLD AUTO: ABNORMAL
RBC BLD AUTO: ABNORMAL
RBC BLD AUTO: NORMAL — SIGNIFICANT CHANGE UP
RBC BLD AUTO: NORMAL — SIGNIFICANT CHANGE UP
RBC CASTS # UR COMP ASSIST: 30 /HPF — HIGH (ref 0–4)
RBC CASTS # UR COMP ASSIST: >720 /HPF — HIGH (ref 0–4)
RENIN DIRECT, PLASMA: 77.5 PG/ML — HIGH
RENIN PLAS-CCNC: 7.14 NG/ML/HR — HIGH (ref 0.17–5.38)
SAO2 % BLDA: 19 % — CRITICAL LOW (ref 94–98)
SAO2 % BLDV: 33 % — SIGNIFICANT CHANGE UP
SAO2 % BLDV: 97 % — SIGNIFICANT CHANGE UP
SODIUM SERPL-SCNC: 126 MMOL/L — LOW (ref 135–146)
SODIUM SERPL-SCNC: 127 MMOL/L — LOW (ref 135–146)
SODIUM SERPL-SCNC: 129 MMOL/L — LOW (ref 135–146)
SODIUM SERPL-SCNC: 130 MMOL/L — LOW (ref 135–146)
SODIUM SERPL-SCNC: 130 MMOL/L — LOW (ref 135–146)
SODIUM SERPL-SCNC: 131 MMOL/L — LOW (ref 135–146)
SODIUM SERPL-SCNC: 132 MMOL/L — LOW (ref 135–146)
SODIUM SERPL-SCNC: 133 MMOL/L — LOW (ref 135–146)
SODIUM SERPL-SCNC: 134 MMOL/L — LOW (ref 135–146)
SODIUM SERPL-SCNC: 135 MMOL/L — SIGNIFICANT CHANGE UP (ref 135–146)
SODIUM SERPL-SCNC: 136 MMOL/L — SIGNIFICANT CHANGE UP (ref 135–146)
SODIUM SERPL-SCNC: 136 MMOL/L — SIGNIFICANT CHANGE UP (ref 135–146)
SODIUM SERPL-SCNC: 137 MMOL/L — SIGNIFICANT CHANGE UP (ref 135–146)
SODIUM SERPL-SCNC: 138 MMOL/L — SIGNIFICANT CHANGE UP (ref 135–146)
SODIUM SERPL-SCNC: 139 MMOL/L — SIGNIFICANT CHANGE UP (ref 135–146)
SODIUM UR-SCNC: 90 MMOL/L — SIGNIFICANT CHANGE UP
SP GR SPEC: 1.01 — SIGNIFICANT CHANGE UP (ref 1.01–1.02)
SP GR SPEC: 1.02 — SIGNIFICANT CHANGE UP (ref 1.01–1.02)
SPECIMEN SOURCE: SIGNIFICANT CHANGE UP
SURGICAL PATHOLOGY STUDY: SIGNIFICANT CHANGE UP
T4 AB SER-ACNC: 2.6 UG/DL — LOW (ref 4.6–12)
TIBC SERPL-MCNC: 213 UG/DL — LOW (ref 220–430)
TRANSFERRIN SERPL-MCNC: 198 MG/DL — LOW (ref 200–360)
TROPONIN T SERPL-MCNC: 0.03 NG/ML — CRITICAL HIGH
TROPONIN T SERPL-MCNC: 0.03 NG/ML — CRITICAL HIGH
TROPONIN T SERPL-MCNC: 0.04 NG/ML — CRITICAL HIGH
TSH SERPL-MCNC: 3.73 UIU/ML — SIGNIFICANT CHANGE UP (ref 0.27–4.2)
TSH SERPL-MCNC: 3.75 UIU/ML — SIGNIFICANT CHANGE UP (ref 0.27–4.2)
UIBC SERPL-MCNC: 178 UG/DL — SIGNIFICANT CHANGE UP (ref 110–370)
UROBILINOGEN FLD QL: SIGNIFICANT CHANGE UP
UROBILINOGEN FLD QL: SIGNIFICANT CHANGE UP
WBC # BLD: 10.1 K/UL — SIGNIFICANT CHANGE UP (ref 4.8–10.8)
WBC # BLD: 11.34 K/UL — HIGH (ref 4.8–10.8)
WBC # BLD: 11.59 K/UL — HIGH (ref 4.8–10.8)
WBC # BLD: 11.73 K/UL — HIGH (ref 4.8–10.8)
WBC # BLD: 12.23 K/UL — HIGH (ref 4.8–10.8)
WBC # BLD: 12.42 K/UL — HIGH (ref 4.8–10.8)
WBC # BLD: 13.18 K/UL — HIGH (ref 4.8–10.8)
WBC # BLD: 14.16 K/UL — HIGH (ref 4.8–10.8)
WBC # BLD: 14.33 K/UL — HIGH (ref 4.8–10.8)
WBC # BLD: 14.5 K/UL — HIGH (ref 4.8–10.8)
WBC # BLD: 15.05 K/UL — HIGH (ref 4.8–10.8)
WBC # BLD: 15.08 K/UL — HIGH (ref 4.8–10.8)
WBC # BLD: 15.15 K/UL — HIGH (ref 4.8–10.8)
WBC # BLD: 15.33 K/UL — HIGH (ref 4.8–10.8)
WBC # BLD: 15.63 K/UL — HIGH (ref 4.8–10.8)
WBC # BLD: 16.01 K/UL — HIGH (ref 4.8–10.8)
WBC # BLD: 16.37 K/UL — HIGH (ref 4.8–10.8)
WBC # BLD: 18 K/UL — HIGH (ref 4.8–10.8)
WBC # BLD: 18.75 K/UL — HIGH (ref 4.8–10.8)
WBC # BLD: 19.1 K/UL — HIGH (ref 4.8–10.8)
WBC # BLD: 19.46 K/UL — HIGH (ref 4.8–10.8)
WBC # BLD: 19.92 K/UL — HIGH (ref 4.8–10.8)
WBC # BLD: 22.85 K/UL — HIGH (ref 4.8–10.8)
WBC # BLD: 9.27 K/UL — SIGNIFICANT CHANGE UP (ref 4.8–10.8)
WBC # BLD: 9.79 K/UL — SIGNIFICANT CHANGE UP (ref 4.8–10.8)
WBC # FLD AUTO: 10.1 K/UL — SIGNIFICANT CHANGE UP (ref 4.8–10.8)
WBC # FLD AUTO: 11.34 K/UL — HIGH (ref 4.8–10.8)
WBC # FLD AUTO: 11.59 K/UL — HIGH (ref 4.8–10.8)
WBC # FLD AUTO: 11.73 K/UL — HIGH (ref 4.8–10.8)
WBC # FLD AUTO: 12.23 K/UL — HIGH (ref 4.8–10.8)
WBC # FLD AUTO: 12.42 K/UL — HIGH (ref 4.8–10.8)
WBC # FLD AUTO: 13.18 K/UL — HIGH (ref 4.8–10.8)
WBC # FLD AUTO: 14.16 K/UL — HIGH (ref 4.8–10.8)
WBC # FLD AUTO: 14.33 K/UL — HIGH (ref 4.8–10.8)
WBC # FLD AUTO: 14.5 K/UL — HIGH (ref 4.8–10.8)
WBC # FLD AUTO: 15.05 K/UL — HIGH (ref 4.8–10.8)
WBC # FLD AUTO: 15.08 K/UL — HIGH (ref 4.8–10.8)
WBC # FLD AUTO: 15.15 K/UL — HIGH (ref 4.8–10.8)
WBC # FLD AUTO: 15.33 K/UL — HIGH (ref 4.8–10.8)
WBC # FLD AUTO: 15.63 K/UL — HIGH (ref 4.8–10.8)
WBC # FLD AUTO: 16.01 K/UL — HIGH (ref 4.8–10.8)
WBC # FLD AUTO: 16.37 K/UL — HIGH (ref 4.8–10.8)
WBC # FLD AUTO: 18 K/UL — HIGH (ref 4.8–10.8)
WBC # FLD AUTO: 18.75 K/UL — HIGH (ref 4.8–10.8)
WBC # FLD AUTO: 19.1 K/UL — HIGH (ref 4.8–10.8)
WBC # FLD AUTO: 19.46 K/UL — HIGH (ref 4.8–10.8)
WBC # FLD AUTO: 19.92 K/UL — HIGH (ref 4.8–10.8)
WBC # FLD AUTO: 22.85 K/UL — HIGH (ref 4.8–10.8)
WBC # FLD AUTO: 9.27 K/UL — SIGNIFICANT CHANGE UP (ref 4.8–10.8)
WBC # FLD AUTO: 9.79 K/UL — SIGNIFICANT CHANGE UP (ref 4.8–10.8)
WBC UR QL: 3 /HPF — SIGNIFICANT CHANGE UP (ref 0–5)
WBC UR QL: 63 /HPF — HIGH (ref 0–5)

## 2019-01-01 PROCEDURE — 71045 X-RAY EXAM CHEST 1 VIEW: CPT | Mod: 26

## 2019-01-01 PROCEDURE — 99232 SBSQ HOSP IP/OBS MODERATE 35: CPT | Mod: 25

## 2019-01-01 PROCEDURE — 99233 SBSQ HOSP IP/OBS HIGH 50: CPT

## 2019-01-01 PROCEDURE — 93010 ELECTROCARDIOGRAM REPORT: CPT

## 2019-01-01 PROCEDURE — 73630 X-RAY EXAM OF FOOT: CPT | Mod: 26,50

## 2019-01-01 PROCEDURE — 88304 TISSUE EXAM BY PATHOLOGIST: CPT | Mod: 26

## 2019-01-01 PROCEDURE — 74176 CT ABD & PELVIS W/O CONTRAST: CPT | Mod: 26

## 2019-01-01 PROCEDURE — 93289 INTERROG DEVICE EVAL HEART: CPT | Mod: 26

## 2019-01-01 PROCEDURE — 70450 CT HEAD/BRAIN W/O DYE: CPT | Mod: 26

## 2019-01-01 PROCEDURE — 99233 SBSQ HOSP IP/OBS HIGH 50: CPT | Mod: 25

## 2019-01-01 PROCEDURE — 93306 TTE W/DOPPLER COMPLETE: CPT | Mod: 26

## 2019-01-01 PROCEDURE — 99222 1ST HOSP IP/OBS MODERATE 55: CPT

## 2019-01-01 PROCEDURE — 73000 X-RAY EXAM OF COLLAR BONE: CPT | Mod: 26,50

## 2019-01-01 PROCEDURE — 99285 EMERGENCY DEPT VISIT HI MDM: CPT

## 2019-01-01 PROCEDURE — 76770 US EXAM ABDO BACK WALL COMP: CPT | Mod: 26

## 2019-01-01 PROCEDURE — 11045 DBRDMT SUBQ TISS EACH ADDL: CPT

## 2019-01-01 PROCEDURE — 99231 SBSQ HOSP IP/OBS SF/LOW 25: CPT

## 2019-01-01 PROCEDURE — 93279 PRGRMG DEV EVAL PM/LDLS PM: CPT | Mod: 26

## 2019-01-01 PROCEDURE — 93280 PM DEVICE PROGR EVAL DUAL: CPT | Mod: 26

## 2019-01-01 PROCEDURE — 99223 1ST HOSP IP/OBS HIGH 75: CPT

## 2019-01-01 PROCEDURE — 72170 X-RAY EXAM OF PELVIS: CPT | Mod: 26

## 2019-01-01 PROCEDURE — 11042 DBRDMT SUBQ TIS 1ST 20SQCM/<: CPT

## 2019-01-01 PROCEDURE — 74018 RADEX ABDOMEN 1 VIEW: CPT | Mod: 26

## 2019-01-01 PROCEDURE — 99232 SBSQ HOSP IP/OBS MODERATE 35: CPT

## 2019-01-01 PROCEDURE — 93010 ELECTROCARDIOGRAM REPORT: CPT | Mod: 76

## 2019-01-01 PROCEDURE — 71045 X-RAY EXAM CHEST 1 VIEW: CPT | Mod: 26,77

## 2019-01-01 PROCEDURE — 99223 1ST HOSP IP/OBS HIGH 75: CPT | Mod: AI

## 2019-01-01 PROCEDURE — 99232 SBSQ HOSP IP/OBS MODERATE 35: CPT | Mod: 57

## 2019-01-01 PROCEDURE — 93970 EXTREMITY STUDY: CPT | Mod: 26

## 2019-01-01 PROCEDURE — 72125 CT NECK SPINE W/O DYE: CPT | Mod: 26

## 2019-01-01 PROCEDURE — 93286 PERI-PX EVAL PM/LDLS PM IP: CPT | Mod: 26

## 2019-01-01 PROCEDURE — 76705 ECHO EXAM OF ABDOMEN: CPT | Mod: 26

## 2019-01-01 PROCEDURE — 73590 X-RAY EXAM OF LOWER LEG: CPT | Mod: 26,50

## 2019-01-01 PROCEDURE — 93925 LOWER EXTREMITY STUDY: CPT | Mod: 26

## 2019-01-01 PROCEDURE — 99291 CRITICAL CARE FIRST HOUR: CPT

## 2019-01-01 PROCEDURE — 33216 INSERT 1 ELECTRODE PM-DEFIB: CPT | Mod: KX

## 2019-01-01 PROCEDURE — 73030 X-RAY EXAM OF SHOULDER: CPT | Mod: 26,RT

## 2019-01-01 PROCEDURE — 99221 1ST HOSP IP/OBS SF/LOW 40: CPT

## 2019-01-01 RX ORDER — MEROPENEM 1 G/30ML
500 INJECTION INTRAVENOUS EVERY 12 HOURS
Refills: 0 | Status: DISCONTINUED | OUTPATIENT
Start: 2019-01-01 | End: 2019-01-01

## 2019-01-01 RX ORDER — INSULIN HUMAN 100 [IU]/ML
10 INJECTION, SOLUTION SUBCUTANEOUS ONCE
Refills: 0 | Status: COMPLETED | OUTPATIENT
Start: 2019-01-01 | End: 2019-01-01

## 2019-01-01 RX ORDER — NIFEDIPINE 30 MG
30 TABLET, EXTENDED RELEASE 24 HR ORAL DAILY
Refills: 0 | Status: DISCONTINUED | OUTPATIENT
Start: 2019-01-01 | End: 2019-01-01

## 2019-01-01 RX ORDER — HEPARIN SODIUM 5000 [USP'U]/ML
11 INJECTION INTRAVENOUS; SUBCUTANEOUS
Qty: 25000 | Refills: 0 | Status: DISCONTINUED | OUTPATIENT
Start: 2019-01-01 | End: 2019-01-01

## 2019-01-01 RX ORDER — HEPARIN SODIUM 5000 [USP'U]/ML
800 INJECTION INTRAVENOUS; SUBCUTANEOUS
Qty: 25000 | Refills: 0 | Status: DISCONTINUED | OUTPATIENT
Start: 2019-01-01 | End: 2019-01-01

## 2019-01-01 RX ORDER — LEVOTHYROXINE SODIUM 125 MCG
125 TABLET ORAL DAILY
Refills: 0 | Status: DISCONTINUED | OUTPATIENT
Start: 2019-01-01 | End: 2019-01-01

## 2019-01-01 RX ORDER — DEXTROSE 50 % IN WATER 50 %
15 SYRINGE (ML) INTRAVENOUS ONCE
Refills: 0 | Status: DISCONTINUED | OUTPATIENT
Start: 2019-01-01 | End: 2019-01-01

## 2019-01-01 RX ORDER — LIDOCAINE 4 G/100G
1 CREAM TOPICAL DAILY
Refills: 0 | Status: DISCONTINUED | OUTPATIENT
Start: 2019-01-01 | End: 2019-01-01

## 2019-01-01 RX ORDER — CEFTRIAXONE 500 MG/1
2000 INJECTION, POWDER, FOR SOLUTION INTRAMUSCULAR; INTRAVENOUS EVERY 24 HOURS
Refills: 0 | Status: DISCONTINUED | OUTPATIENT
Start: 2019-01-01 | End: 2019-01-01

## 2019-01-01 RX ORDER — ACETAMINOPHEN 500 MG
650 TABLET ORAL ONCE
Refills: 0 | Status: COMPLETED | OUTPATIENT
Start: 2019-01-01 | End: 2019-01-01

## 2019-01-01 RX ORDER — INSULIN GLARGINE 100 [IU]/ML
15 INJECTION, SOLUTION SUBCUTANEOUS EVERY MORNING
Refills: 0 | Status: DISCONTINUED | OUTPATIENT
Start: 2019-01-01 | End: 2019-01-01

## 2019-01-01 RX ORDER — DEXTROSE 50 % IN WATER 50 %
50 SYRINGE (ML) INTRAVENOUS ONCE
Refills: 0 | Status: COMPLETED | OUTPATIENT
Start: 2019-01-01 | End: 2019-01-01

## 2019-01-01 RX ORDER — SODIUM CHLORIDE 9 MG/ML
1000 INJECTION, SOLUTION INTRAVENOUS
Refills: 0 | Status: DISCONTINUED | OUTPATIENT
Start: 2019-01-01 | End: 2019-01-01

## 2019-01-01 RX ORDER — FENTANYL CITRATE 50 UG/ML
0.5 INJECTION INTRAVENOUS
Qty: 2500 | Refills: 0 | Status: DISCONTINUED | OUTPATIENT
Start: 2019-01-01 | End: 2019-01-01

## 2019-01-01 RX ORDER — DEXTROSE 50 % IN WATER 50 %
25 SYRINGE (ML) INTRAVENOUS ONCE
Refills: 0 | Status: DISCONTINUED | OUTPATIENT
Start: 2019-01-01 | End: 2019-01-01

## 2019-01-01 RX ORDER — ATROPINE SULFATE 0.1 MG/ML
1 SYRINGE (ML) INJECTION ONCE
Refills: 0 | Status: DISCONTINUED | OUTPATIENT
Start: 2019-01-01 | End: 2019-01-01

## 2019-01-01 RX ORDER — MORPHINE SULFATE 50 MG/1
1 CAPSULE, EXTENDED RELEASE ORAL ONCE
Refills: 0 | Status: DISCONTINUED | OUTPATIENT
Start: 2019-01-01 | End: 2019-01-01

## 2019-01-01 RX ORDER — SODIUM POLYSTYRENE SULFONATE 4.1 MEQ/G
30 POWDER, FOR SUSPENSION ORAL ONCE
Refills: 0 | Status: COMPLETED | OUTPATIENT
Start: 2019-01-01 | End: 2019-01-01

## 2019-01-01 RX ORDER — HYDROMORPHONE HYDROCHLORIDE 2 MG/ML
0.5 INJECTION INTRAMUSCULAR; INTRAVENOUS; SUBCUTANEOUS ONCE
Refills: 0 | Status: DISCONTINUED | OUTPATIENT
Start: 2019-01-01 | End: 2019-01-01

## 2019-01-01 RX ORDER — CEFEPIME 1 G/1
INJECTION, POWDER, FOR SOLUTION INTRAMUSCULAR; INTRAVENOUS
Refills: 0 | Status: DISCONTINUED | OUTPATIENT
Start: 2019-01-01 | End: 2019-01-01

## 2019-01-01 RX ORDER — MIDODRINE HYDROCHLORIDE 2.5 MG/1
10 TABLET ORAL THREE TIMES A DAY
Refills: 0 | Status: DISCONTINUED | OUTPATIENT
Start: 2019-01-01 | End: 2019-01-01

## 2019-01-01 RX ORDER — HEPARIN SODIUM 5000 [USP'U]/ML
700 INJECTION INTRAVENOUS; SUBCUTANEOUS
Qty: 25000 | Refills: 0 | Status: DISCONTINUED | OUTPATIENT
Start: 2019-01-01 | End: 2019-01-01

## 2019-01-01 RX ORDER — INSULIN LISPRO 100/ML
VIAL (ML) SUBCUTANEOUS
Refills: 0 | Status: DISCONTINUED | OUTPATIENT
Start: 2019-01-01 | End: 2019-01-01

## 2019-01-01 RX ORDER — INSULIN LISPRO 100/ML
6 VIAL (ML) SUBCUTANEOUS ONCE
Refills: 0 | Status: COMPLETED | OUTPATIENT
Start: 2019-01-01 | End: 2019-01-01

## 2019-01-01 RX ORDER — INSULIN GLARGINE 100 [IU]/ML
32 INJECTION, SOLUTION SUBCUTANEOUS EVERY MORNING
Refills: 0 | Status: DISCONTINUED | OUTPATIENT
Start: 2019-01-01 | End: 2019-01-01

## 2019-01-01 RX ORDER — HYDRALAZINE HCL 50 MG
25 TABLET ORAL THREE TIMES A DAY
Refills: 0 | Status: DISCONTINUED | OUTPATIENT
Start: 2019-01-01 | End: 2019-01-01

## 2019-01-01 RX ORDER — LOSARTAN POTASSIUM 100 MG/1
1 TABLET, FILM COATED ORAL
Qty: 0 | Refills: 0 | DISCHARGE

## 2019-01-01 RX ORDER — INSULIN LISPRO 100/ML
5 VIAL (ML) SUBCUTANEOUS
Refills: 0 | Status: DISCONTINUED | OUTPATIENT
Start: 2019-01-01 | End: 2019-01-01

## 2019-01-01 RX ORDER — SODIUM CHLORIDE 9 MG/ML
1000 INJECTION INTRAMUSCULAR; INTRAVENOUS; SUBCUTANEOUS
Refills: 0 | Status: DISCONTINUED | OUTPATIENT
Start: 2019-01-01 | End: 2019-01-01

## 2019-01-01 RX ORDER — CEFEPIME 1 G/1
1000 INJECTION, POWDER, FOR SOLUTION INTRAMUSCULAR; INTRAVENOUS ONCE
Refills: 0 | Status: COMPLETED | OUTPATIENT
Start: 2019-01-01 | End: 2019-01-01

## 2019-01-01 RX ORDER — CALCIUM GLUCONATE 100 MG/ML
2 VIAL (ML) INTRAVENOUS ONCE
Refills: 0 | Status: COMPLETED | OUTPATIENT
Start: 2019-01-01 | End: 2019-01-01

## 2019-01-01 RX ORDER — AMPICILLIN SODIUM AND SULBACTAM SODIUM 250; 125 MG/ML; MG/ML
3 INJECTION, POWDER, FOR SUSPENSION INTRAMUSCULAR; INTRAVENOUS EVERY 12 HOURS
Refills: 0 | Status: DISCONTINUED | OUTPATIENT
Start: 2019-01-01 | End: 2019-01-01

## 2019-01-01 RX ORDER — HEPARIN SODIUM 5000 [USP'U]/ML
2500 INJECTION INTRAVENOUS; SUBCUTANEOUS ONCE
Refills: 0 | Status: COMPLETED | OUTPATIENT
Start: 2019-01-01 | End: 2019-01-01

## 2019-01-01 RX ORDER — PIPERACILLIN AND TAZOBACTAM 4; .5 G/20ML; G/20ML
3.38 INJECTION, POWDER, LYOPHILIZED, FOR SOLUTION INTRAVENOUS ONCE
Refills: 0 | Status: COMPLETED | OUTPATIENT
Start: 2019-01-01 | End: 2019-01-01

## 2019-01-01 RX ORDER — DICLOFENAC SODIUM 30 MG/G
0 GEL TOPICAL
Qty: 0 | Refills: 0 | DISCHARGE

## 2019-01-01 RX ORDER — LEVOTHYROXINE SODIUM 125 MCG
1 TABLET ORAL
Qty: 0 | Refills: 0 | DISCHARGE

## 2019-01-01 RX ORDER — GLUCAGON INJECTION, SOLUTION 0.5 MG/.1ML
1 INJECTION, SOLUTION SUBCUTANEOUS ONCE
Refills: 0 | Status: DISCONTINUED | OUTPATIENT
Start: 2019-01-01 | End: 2019-01-01

## 2019-01-01 RX ORDER — INSULIN GLARGINE 100 [IU]/ML
5 INJECTION, SOLUTION SUBCUTANEOUS AT BEDTIME
Refills: 0 | Status: DISCONTINUED | OUTPATIENT
Start: 2019-01-01 | End: 2019-01-01

## 2019-01-01 RX ORDER — VANCOMYCIN HCL 1 G
125 VIAL (EA) INTRAVENOUS EVERY 6 HOURS
Refills: 0 | Status: DISCONTINUED | OUTPATIENT
Start: 2019-01-01 | End: 2019-01-01

## 2019-01-01 RX ORDER — SODIUM POLYSTYRENE SULFONATE 4.1 MEQ/G
15 POWDER, FOR SUSPENSION ORAL ONCE
Refills: 0 | Status: COMPLETED | OUTPATIENT
Start: 2019-01-01 | End: 2019-01-01

## 2019-01-01 RX ORDER — PANTOPRAZOLE SODIUM 20 MG/1
40 TABLET, DELAYED RELEASE ORAL
Refills: 0 | Status: DISCONTINUED | OUTPATIENT
Start: 2019-01-01 | End: 2019-01-01

## 2019-01-01 RX ORDER — ATORVASTATIN CALCIUM 80 MG/1
1 TABLET, FILM COATED ORAL
Qty: 0 | Refills: 0 | DISCHARGE

## 2019-01-01 RX ORDER — ACETAMINOPHEN 500 MG
650 TABLET ORAL EVERY 6 HOURS
Refills: 0 | Status: DISCONTINUED | OUTPATIENT
Start: 2019-01-01 | End: 2019-01-01

## 2019-01-01 RX ORDER — MORPHINE SULFATE 50 MG/1
2 CAPSULE, EXTENDED RELEASE ORAL ONCE
Refills: 0 | Status: DISCONTINUED | OUTPATIENT
Start: 2019-01-01 | End: 2019-01-01

## 2019-01-01 RX ORDER — ATORVASTATIN CALCIUM 80 MG/1
10 TABLET, FILM COATED ORAL AT BEDTIME
Refills: 0 | Status: DISCONTINUED | OUTPATIENT
Start: 2019-01-01 | End: 2019-01-01

## 2019-01-01 RX ORDER — FUROSEMIDE 40 MG
40 TABLET ORAL DAILY
Refills: 0 | Status: DISCONTINUED | OUTPATIENT
Start: 2019-01-01 | End: 2019-01-01

## 2019-01-01 RX ORDER — DEXTROSE 10 % IN WATER 10 %
250 INTRAVENOUS SOLUTION INTRAVENOUS
Refills: 0 | Status: DISCONTINUED | OUTPATIENT
Start: 2019-01-01 | End: 2019-01-01

## 2019-01-01 RX ORDER — DOPAMINE HYDROCHLORIDE 40 MG/ML
1 INJECTION, SOLUTION, CONCENTRATE INTRAVENOUS
Qty: 400 | Refills: 0 | Status: DISCONTINUED | OUTPATIENT
Start: 2019-01-01 | End: 2019-01-01

## 2019-01-01 RX ORDER — INSULIN LISPRO 100/ML
7 VIAL (ML) SUBCUTANEOUS
Refills: 0 | Status: DISCONTINUED | OUTPATIENT
Start: 2019-01-01 | End: 2019-01-01

## 2019-01-01 RX ORDER — DOPAMINE HYDROCHLORIDE 40 MG/ML
2.5 INJECTION, SOLUTION, CONCENTRATE INTRAVENOUS
Qty: 400 | Refills: 0 | Status: DISCONTINUED | OUTPATIENT
Start: 2019-01-01 | End: 2019-01-01

## 2019-01-01 RX ORDER — INSULIN LISPRO 100/ML
9 VIAL (ML) SUBCUTANEOUS
Refills: 0 | Status: DISCONTINUED | OUTPATIENT
Start: 2019-01-01 | End: 2019-01-01

## 2019-01-01 RX ORDER — VANCOMYCIN HCL 1 G
500 VIAL (EA) INTRAVENOUS ONCE
Refills: 0 | Status: DISCONTINUED | OUTPATIENT
Start: 2019-01-01 | End: 2019-01-01

## 2019-01-01 RX ORDER — HEPARIN SODIUM 5000 [USP'U]/ML
INJECTION INTRAVENOUS; SUBCUTANEOUS
Qty: 25000 | Refills: 0 | Status: DISCONTINUED | OUTPATIENT
Start: 2019-01-01 | End: 2019-01-01

## 2019-01-01 RX ORDER — INSULIN LISPRO 100/ML
8 VIAL (ML) SUBCUTANEOUS
Refills: 0 | Status: DISCONTINUED | OUTPATIENT
Start: 2019-01-01 | End: 2019-01-01

## 2019-01-01 RX ORDER — MORPHINE SULFATE 50 MG/1
2 CAPSULE, EXTENDED RELEASE ORAL EVERY 6 HOURS
Refills: 0 | Status: DISCONTINUED | OUTPATIENT
Start: 2019-01-01 | End: 2019-01-01

## 2019-01-01 RX ORDER — FUROSEMIDE 40 MG
40 TABLET ORAL ONCE
Refills: 0 | Status: DISCONTINUED | OUTPATIENT
Start: 2019-01-01 | End: 2019-01-01

## 2019-01-01 RX ORDER — FUROSEMIDE 40 MG
40 TABLET ORAL ONCE
Refills: 0 | Status: COMPLETED | OUTPATIENT
Start: 2019-01-01 | End: 2019-01-01

## 2019-01-01 RX ORDER — HEPARIN SODIUM 5000 [USP'U]/ML
5000 INJECTION INTRAVENOUS; SUBCUTANEOUS ONCE
Refills: 0 | Status: COMPLETED | OUTPATIENT
Start: 2019-01-01 | End: 2019-01-01

## 2019-01-01 RX ORDER — LOSARTAN POTASSIUM 100 MG/1
50 TABLET, FILM COATED ORAL DAILY
Refills: 0 | Status: DISCONTINUED | OUTPATIENT
Start: 2019-01-01 | End: 2019-01-01

## 2019-01-01 RX ORDER — MORPHINE SULFATE 50 MG/1
1 CAPSULE, EXTENDED RELEASE ORAL
Refills: 0 | Status: DISCONTINUED | OUTPATIENT
Start: 2019-01-01 | End: 2019-01-01

## 2019-01-01 RX ORDER — LANOLIN ALCOHOL/MO/W.PET/CERES
5 CREAM (GRAM) TOPICAL ONCE
Refills: 0 | Status: COMPLETED | OUTPATIENT
Start: 2019-01-01 | End: 2019-01-01

## 2019-01-01 RX ORDER — HEPARIN SODIUM 5000 [USP'U]/ML
900 INJECTION INTRAVENOUS; SUBCUTANEOUS
Qty: 25000 | Refills: 0 | Status: DISCONTINUED | OUTPATIENT
Start: 2019-01-01 | End: 2019-01-01

## 2019-01-01 RX ORDER — CEFAZOLIN SODIUM 1 G
1000 VIAL (EA) INJECTION ONCE
Refills: 0 | Status: COMPLETED | OUTPATIENT
Start: 2019-01-01 | End: 2019-01-01

## 2019-01-01 RX ORDER — INSULIN HUMAN 100 [IU]/ML
8 INJECTION, SOLUTION SUBCUTANEOUS ONCE
Refills: 0 | Status: COMPLETED | OUTPATIENT
Start: 2019-01-01 | End: 2019-01-01

## 2019-01-01 RX ORDER — HEPARIN SODIUM 5000 [USP'U]/ML
850 INJECTION INTRAVENOUS; SUBCUTANEOUS
Qty: 25000 | Refills: 0 | Status: DISCONTINUED | OUTPATIENT
Start: 2019-01-01 | End: 2019-01-01

## 2019-01-01 RX ORDER — DEXTROSE 50 % IN WATER 50 %
50 SYRINGE (ML) INTRAVENOUS ONCE
Refills: 0 | Status: DISCONTINUED | OUTPATIENT
Start: 2019-01-01 | End: 2019-01-01

## 2019-01-01 RX ORDER — CHLORHEXIDINE GLUCONATE 213 G/1000ML
1 SOLUTION TOPICAL
Refills: 0 | Status: DISCONTINUED | OUTPATIENT
Start: 2019-01-01 | End: 2019-01-01

## 2019-01-01 RX ORDER — INSULIN GLARGINE 100 [IU]/ML
21 INJECTION, SOLUTION SUBCUTANEOUS EVERY MORNING
Refills: 0 | Status: DISCONTINUED | OUTPATIENT
Start: 2019-01-01 | End: 2019-01-01

## 2019-01-01 RX ORDER — AMLODIPINE BESYLATE 2.5 MG/1
5 TABLET ORAL DAILY
Refills: 0 | Status: DISCONTINUED | OUTPATIENT
Start: 2019-01-01 | End: 2019-01-01

## 2019-01-01 RX ORDER — INSULIN LISPRO 100/ML
4 VIAL (ML) SUBCUTANEOUS ONCE
Refills: 0 | Status: COMPLETED | OUTPATIENT
Start: 2019-01-01 | End: 2019-01-01

## 2019-01-01 RX ORDER — NYSTATIN CREAM 100000 [USP'U]/G
1 CREAM TOPICAL THREE TIMES A DAY
Refills: 0 | Status: DISCONTINUED | OUTPATIENT
Start: 2019-01-01 | End: 2019-01-01

## 2019-01-01 RX ORDER — DEXTROSE 50 % IN WATER 50 %
12.5 SYRINGE (ML) INTRAVENOUS ONCE
Refills: 0 | Status: DISCONTINUED | OUTPATIENT
Start: 2019-01-01 | End: 2019-01-01

## 2019-01-01 RX ORDER — AMPICILLIN SODIUM AND SULBACTAM SODIUM 250; 125 MG/ML; MG/ML
INJECTION, POWDER, FOR SUSPENSION INTRAMUSCULAR; INTRAVENOUS
Refills: 0 | Status: DISCONTINUED | OUTPATIENT
Start: 2019-01-01 | End: 2019-01-01

## 2019-01-01 RX ORDER — NOREPINEPHRINE BITARTRATE/D5W 8 MG/250ML
0.05 PLASTIC BAG, INJECTION (ML) INTRAVENOUS
Qty: 16 | Refills: 0 | Status: DISCONTINUED | OUTPATIENT
Start: 2019-01-01 | End: 2019-01-01

## 2019-01-01 RX ORDER — ATROPINE SULFATE 0.1 MG/ML
0.5 SYRINGE (ML) INJECTION ONCE
Refills: 0 | Status: DISCONTINUED | OUTPATIENT
Start: 2019-01-01 | End: 2019-01-01

## 2019-01-01 RX ORDER — DEXTROSE 50 % IN WATER 50 %
25 SYRINGE (ML) INTRAVENOUS ONCE
Refills: 0 | Status: COMPLETED | OUTPATIENT
Start: 2019-01-01 | End: 2019-01-01

## 2019-01-01 RX ORDER — DEXTROSE 10 % IN WATER 10 %
250 INTRAVENOUS SOLUTION INTRAVENOUS
Refills: 0 | Status: COMPLETED | OUTPATIENT
Start: 2019-01-01 | End: 2019-01-01

## 2019-01-01 RX ORDER — CEFEPIME 1 G/1
1000 INJECTION, POWDER, FOR SOLUTION INTRAMUSCULAR; INTRAVENOUS EVERY 24 HOURS
Refills: 0 | Status: DISCONTINUED | OUTPATIENT
Start: 2019-01-01 | End: 2019-01-01

## 2019-01-01 RX ORDER — INSULIN LISPRO 100/ML
5 VIAL (ML) SUBCUTANEOUS ONCE
Refills: 0 | Status: COMPLETED | OUTPATIENT
Start: 2019-01-01 | End: 2019-01-01

## 2019-01-01 RX ORDER — COLLAGENASE CLOSTRIDIUM HIST. 250 UNIT/G
1 OINTMENT (GRAM) TOPICAL
Refills: 0 | Status: DISCONTINUED | OUTPATIENT
Start: 2019-01-01 | End: 2019-01-01

## 2019-01-01 RX ORDER — SODIUM BICARBONATE 1 MEQ/ML
650 SYRINGE (ML) INTRAVENOUS THREE TIMES A DAY
Refills: 0 | Status: DISCONTINUED | OUTPATIENT
Start: 2019-01-01 | End: 2019-01-01

## 2019-01-01 RX ORDER — HYDROMORPHONE HYDROCHLORIDE 2 MG/ML
1 INJECTION INTRAMUSCULAR; INTRAVENOUS; SUBCUTANEOUS
Refills: 0 | Status: DISCONTINUED | OUTPATIENT
Start: 2019-01-01 | End: 2019-01-01

## 2019-01-01 RX ORDER — CALCIUM GLUCONATE 100 MG/ML
2 VIAL (ML) INTRAVENOUS ONCE
Refills: 0 | Status: DISCONTINUED | OUTPATIENT
Start: 2019-01-01 | End: 2019-01-01

## 2019-01-01 RX ORDER — INFLUENZA VIRUS VACCINE 15; 15; 15; 15 UG/.5ML; UG/.5ML; UG/.5ML; UG/.5ML
0.5 SUSPENSION INTRAMUSCULAR ONCE
Refills: 0 | Status: DISCONTINUED | OUTPATIENT
Start: 2019-01-01 | End: 2019-01-01

## 2019-01-01 RX ORDER — SODIUM POLYSTYRENE SULFONATE 4.1 MEQ/G
15 POWDER, FOR SUSPENSION ORAL ONCE
Refills: 0 | Status: DISCONTINUED | OUTPATIENT
Start: 2019-01-01 | End: 2019-01-01

## 2019-01-01 RX ORDER — DOPAMINE HYDROCHLORIDE 40 MG/ML
3 INJECTION, SOLUTION, CONCENTRATE INTRAVENOUS
Qty: 400 | Refills: 0 | Status: DISCONTINUED | OUTPATIENT
Start: 2019-01-01 | End: 2019-01-01

## 2019-01-01 RX ORDER — HYDROMORPHONE HYDROCHLORIDE 2 MG/ML
0.5 INJECTION INTRAMUSCULAR; INTRAVENOUS; SUBCUTANEOUS
Refills: 0 | Status: DISCONTINUED | OUTPATIENT
Start: 2019-01-01 | End: 2019-01-01

## 2019-01-01 RX ORDER — MIDAZOLAM HYDROCHLORIDE 1 MG/ML
0.02 INJECTION, SOLUTION INTRAMUSCULAR; INTRAVENOUS
Qty: 100 | Refills: 0 | Status: DISCONTINUED | OUTPATIENT
Start: 2019-01-01 | End: 2019-01-01

## 2019-01-01 RX ORDER — OXYCODONE AND ACETAMINOPHEN 5; 325 MG/1; MG/1
1 TABLET ORAL ONCE
Refills: 0 | Status: DISCONTINUED | OUTPATIENT
Start: 2019-01-01 | End: 2019-01-01

## 2019-01-01 RX ORDER — OXYCODONE HYDROCHLORIDE 5 MG/1
5 TABLET ORAL EVERY 6 HOURS
Refills: 0 | Status: DISCONTINUED | OUTPATIENT
Start: 2019-01-01 | End: 2019-01-01

## 2019-01-01 RX ORDER — VANCOMYCIN HCL 1 G
1000 VIAL (EA) INTRAVENOUS ONCE
Refills: 0 | Status: COMPLETED | OUTPATIENT
Start: 2019-01-01 | End: 2019-01-01

## 2019-01-01 RX ORDER — INSULIN LISPRO 100/ML
7 VIAL (ML) SUBCUTANEOUS ONCE
Refills: 0 | Status: COMPLETED | OUTPATIENT
Start: 2019-01-01 | End: 2019-01-01

## 2019-01-01 RX ORDER — INSULIN GLARGINE 100 [IU]/ML
28 INJECTION, SOLUTION SUBCUTANEOUS EVERY MORNING
Refills: 0 | Status: DISCONTINUED | OUTPATIENT
Start: 2019-01-01 | End: 2019-01-01

## 2019-01-01 RX ORDER — HEPARIN SODIUM 5000 [USP'U]/ML
8 INJECTION INTRAVENOUS; SUBCUTANEOUS
Qty: 25000 | Refills: 0 | Status: DISCONTINUED | OUTPATIENT
Start: 2019-01-01 | End: 2019-01-01

## 2019-01-01 RX ORDER — INSULIN GLARGINE 100 [IU]/ML
36 INJECTION, SOLUTION SUBCUTANEOUS EVERY MORNING
Refills: 0 | Status: DISCONTINUED | OUTPATIENT
Start: 2019-01-01 | End: 2019-01-01

## 2019-01-01 RX ORDER — NIFEDIPINE 30 MG
60 TABLET, EXTENDED RELEASE 24 HR ORAL DAILY
Refills: 0 | Status: DISCONTINUED | OUTPATIENT
Start: 2019-01-01 | End: 2019-01-01

## 2019-01-01 RX ORDER — SODIUM CHLORIDE 9 MG/ML
1000 INJECTION INTRAMUSCULAR; INTRAVENOUS; SUBCUTANEOUS ONCE
Refills: 0 | Status: COMPLETED | OUTPATIENT
Start: 2019-01-01 | End: 2019-01-01

## 2019-01-01 RX ORDER — AMPICILLIN SODIUM AND SULBACTAM SODIUM 250; 125 MG/ML; MG/ML
3 INJECTION, POWDER, FOR SUSPENSION INTRAMUSCULAR; INTRAVENOUS ONCE
Refills: 0 | Status: COMPLETED | OUTPATIENT
Start: 2019-01-01 | End: 2019-01-01

## 2019-01-01 RX ORDER — BACITRACIN ZINC 500 UNIT/G
1 OINTMENT IN PACKET (EA) TOPICAL
Refills: 0 | Status: DISCONTINUED | OUTPATIENT
Start: 2019-01-01 | End: 2019-01-01

## 2019-01-01 RX ORDER — GENTAMICIN SULFATE 0.1 %
1 OINTMENT (GRAM) TOPICAL
Refills: 0 | Status: DISCONTINUED | OUTPATIENT
Start: 2019-01-01 | End: 2019-01-01

## 2019-01-01 RX ADMIN — SODIUM CHLORIDE 50 MILLILITER(S): 9 INJECTION INTRAMUSCULAR; INTRAVENOUS; SUBCUTANEOUS at 21:45

## 2019-01-01 RX ADMIN — CHLORHEXIDINE GLUCONATE 1 APPLICATION(S): 213 SOLUTION TOPICAL at 05:24

## 2019-01-01 RX ADMIN — INSULIN HUMAN 10 UNIT(S): 100 INJECTION, SOLUTION SUBCUTANEOUS at 10:37

## 2019-01-01 RX ADMIN — Medication 5 MILLIGRAM(S): at 00:55

## 2019-01-01 RX ADMIN — Medication 20 MILLIGRAM(S): at 05:35

## 2019-01-01 RX ADMIN — PANTOPRAZOLE SODIUM 40 MILLIGRAM(S): 20 TABLET, DELAYED RELEASE ORAL at 06:00

## 2019-01-01 RX ADMIN — Medication 40 MILLIGRAM(S): at 10:47

## 2019-01-01 RX ADMIN — Medication 650 MILLIGRAM(S): at 21:29

## 2019-01-01 RX ADMIN — OXYCODONE HYDROCHLORIDE 5 MILLIGRAM(S): 5 TABLET ORAL at 03:00

## 2019-01-01 RX ADMIN — LIDOCAINE 1 PATCH: 4 CREAM TOPICAL at 11:52

## 2019-01-01 RX ADMIN — OXYCODONE HYDROCHLORIDE 5 MILLIGRAM(S): 5 TABLET ORAL at 14:27

## 2019-01-01 RX ADMIN — Medication 125 MICROGRAM(S): at 05:59

## 2019-01-01 RX ADMIN — Medication 1000 MILLILITER(S): at 16:34

## 2019-01-01 RX ADMIN — Medication 650 MILLIGRAM(S): at 05:58

## 2019-01-01 RX ADMIN — LIDOCAINE 1 PATCH: 4 CREAM TOPICAL at 11:20

## 2019-01-01 RX ADMIN — LIDOCAINE 1 PATCH: 4 CREAM TOPICAL at 12:02

## 2019-01-01 RX ADMIN — Medication 25 MILLIGRAM(S): at 22:00

## 2019-01-01 RX ADMIN — Medication 650 MILLIGRAM(S): at 09:30

## 2019-01-01 RX ADMIN — NYSTATIN CREAM 1 APPLICATION(S): 100000 CREAM TOPICAL at 05:41

## 2019-01-01 RX ADMIN — PANTOPRAZOLE SODIUM 40 MILLIGRAM(S): 20 TABLET, DELAYED RELEASE ORAL at 05:51

## 2019-01-01 RX ADMIN — Medication 5 UNIT(S): at 17:30

## 2019-01-01 RX ADMIN — INSULIN GLARGINE 5 UNIT(S): 100 INJECTION, SOLUTION SUBCUTANEOUS at 22:01

## 2019-01-01 RX ADMIN — OXYCODONE HYDROCHLORIDE 5 MILLIGRAM(S): 5 TABLET ORAL at 01:29

## 2019-01-01 RX ADMIN — LIDOCAINE 1 PATCH: 4 CREAM TOPICAL at 23:52

## 2019-01-01 RX ADMIN — Medication 20 MILLIGRAM(S): at 06:49

## 2019-01-01 RX ADMIN — Medication 5 UNIT(S): at 08:39

## 2019-01-01 RX ADMIN — Medication 125 MICROGRAM(S): at 07:07

## 2019-01-01 RX ADMIN — Medication 30 MILLIGRAM(S): at 06:18

## 2019-01-01 RX ADMIN — SODIUM CHLORIDE 1000 MILLILITER(S): 9 INJECTION INTRAMUSCULAR; INTRAVENOUS; SUBCUTANEOUS at 22:55

## 2019-01-01 RX ADMIN — MORPHINE SULFATE 1 MILLIGRAM(S): 50 CAPSULE, EXTENDED RELEASE ORAL at 05:00

## 2019-01-01 RX ADMIN — HEPARIN SODIUM 0.11 UNIT(S)/HR: 5000 INJECTION INTRAVENOUS; SUBCUTANEOUS at 11:22

## 2019-01-01 RX ADMIN — Medication 1 APPLICATION(S): at 18:15

## 2019-01-01 RX ADMIN — ATORVASTATIN CALCIUM 10 MILLIGRAM(S): 80 TABLET, FILM COATED ORAL at 21:46

## 2019-01-01 RX ADMIN — Medication 125 MILLIGRAM(S): at 11:53

## 2019-01-01 RX ADMIN — Medication 1 APPLICATION(S): at 06:01

## 2019-01-01 RX ADMIN — Medication 25 MILLIGRAM(S): at 22:19

## 2019-01-01 RX ADMIN — LIDOCAINE 1 PATCH: 4 CREAM TOPICAL at 12:35

## 2019-01-01 RX ADMIN — Medication 25 MILLIGRAM(S): at 07:07

## 2019-01-01 RX ADMIN — Medication 20 MILLIGRAM(S): at 05:06

## 2019-01-01 RX ADMIN — LIDOCAINE 1 PATCH: 4 CREAM TOPICAL at 19:39

## 2019-01-01 RX ADMIN — Medication 650 MILLIGRAM(S): at 09:44

## 2019-01-01 RX ADMIN — INSULIN HUMAN 8 UNIT(S): 100 INJECTION, SOLUTION SUBCUTANEOUS at 03:02

## 2019-01-01 RX ADMIN — Medication 125 MILLIGRAM(S): at 23:36

## 2019-01-01 RX ADMIN — HYDROMORPHONE HYDROCHLORIDE 0.5 MILLIGRAM(S): 2 INJECTION INTRAMUSCULAR; INTRAVENOUS; SUBCUTANEOUS at 07:44

## 2019-01-01 RX ADMIN — MORPHINE SULFATE 1 MILLIGRAM(S): 50 CAPSULE, EXTENDED RELEASE ORAL at 14:50

## 2019-01-01 RX ADMIN — Medication 60 MILLIGRAM(S): at 06:49

## 2019-01-01 RX ADMIN — Medication 1 APPLICATION(S): at 05:41

## 2019-01-01 RX ADMIN — MORPHINE SULFATE 1 MILLIGRAM(S): 50 CAPSULE, EXTENDED RELEASE ORAL at 04:41

## 2019-01-01 RX ADMIN — PANTOPRAZOLE SODIUM 40 MILLIGRAM(S): 20 TABLET, DELAYED RELEASE ORAL at 06:23

## 2019-01-01 RX ADMIN — LIDOCAINE 1 PATCH: 4 CREAM TOPICAL at 19:00

## 2019-01-01 RX ADMIN — INSULIN HUMAN 10 UNIT(S): 100 INJECTION, SOLUTION SUBCUTANEOUS at 20:42

## 2019-01-01 RX ADMIN — CEFEPIME 100 MILLIGRAM(S): 1 INJECTION, POWDER, FOR SOLUTION INTRAMUSCULAR; INTRAVENOUS at 18:45

## 2019-01-01 RX ADMIN — Medication 25 MILLIGRAM(S): at 14:46

## 2019-01-01 RX ADMIN — Medication 125 MICROGRAM(S): at 06:20

## 2019-01-01 RX ADMIN — Medication 650 MILLIGRAM(S): at 05:49

## 2019-01-01 RX ADMIN — Medication 25 MILLIGRAM(S): at 13:41

## 2019-01-01 RX ADMIN — MORPHINE SULFATE 1 MILLIGRAM(S): 50 CAPSULE, EXTENDED RELEASE ORAL at 17:06

## 2019-01-01 RX ADMIN — Medication 125 MICROGRAM(S): at 06:49

## 2019-01-01 RX ADMIN — Medication 1 APPLICATION(S): at 05:58

## 2019-01-01 RX ADMIN — AMPICILLIN SODIUM AND SULBACTAM SODIUM 200 GRAM(S): 250; 125 INJECTION, POWDER, FOR SUSPENSION INTRAMUSCULAR; INTRAVENOUS at 16:35

## 2019-01-01 RX ADMIN — Medication 125 MICROGRAM(S): at 05:24

## 2019-01-01 RX ADMIN — CEFEPIME 100 MILLIGRAM(S): 1 INJECTION, POWDER, FOR SOLUTION INTRAMUSCULAR; INTRAVENOUS at 18:32

## 2019-01-01 RX ADMIN — LIDOCAINE 1 PATCH: 4 CREAM TOPICAL at 17:35

## 2019-01-01 RX ADMIN — LIDOCAINE 1 PATCH: 4 CREAM TOPICAL at 00:12

## 2019-01-01 RX ADMIN — HEPARIN SODIUM 8 UNIT(S)/HR: 5000 INJECTION INTRAVENOUS; SUBCUTANEOUS at 16:30

## 2019-01-01 RX ADMIN — Medication 650 MILLIGRAM(S): at 22:11

## 2019-01-01 RX ADMIN — Medication 3: at 11:52

## 2019-01-01 RX ADMIN — OXYCODONE HYDROCHLORIDE 5 MILLIGRAM(S): 5 TABLET ORAL at 00:33

## 2019-01-01 RX ADMIN — Medication 1 APPLICATION(S): at 17:35

## 2019-01-01 RX ADMIN — Medication 25 MILLIGRAM(S): at 07:10

## 2019-01-01 RX ADMIN — Medication 1 APPLICATION(S): at 17:07

## 2019-01-01 RX ADMIN — HYDROMORPHONE HYDROCHLORIDE 0.5 MILLIGRAM(S): 2 INJECTION INTRAMUSCULAR; INTRAVENOUS; SUBCUTANEOUS at 07:45

## 2019-01-01 RX ADMIN — Medication 3: at 17:01

## 2019-01-01 RX ADMIN — MORPHINE SULFATE 1 MILLIGRAM(S): 50 CAPSULE, EXTENDED RELEASE ORAL at 20:44

## 2019-01-01 RX ADMIN — MORPHINE SULFATE 1 MILLIGRAM(S): 50 CAPSULE, EXTENDED RELEASE ORAL at 15:17

## 2019-01-01 RX ADMIN — OXYCODONE HYDROCHLORIDE 5 MILLIGRAM(S): 5 TABLET ORAL at 14:43

## 2019-01-01 RX ADMIN — INSULIN GLARGINE 15 UNIT(S): 100 INJECTION, SOLUTION SUBCUTANEOUS at 12:56

## 2019-01-01 RX ADMIN — LIDOCAINE 1 PATCH: 4 CREAM TOPICAL at 22:50

## 2019-01-01 RX ADMIN — CHLORHEXIDINE GLUCONATE 1 APPLICATION(S): 213 SOLUTION TOPICAL at 06:19

## 2019-01-01 RX ADMIN — INSULIN GLARGINE 28 UNIT(S): 100 INJECTION, SOLUTION SUBCUTANEOUS at 08:03

## 2019-01-01 RX ADMIN — Medication 650 MILLIGRAM(S): at 12:20

## 2019-01-01 RX ADMIN — Medication 125 MICROGRAM(S): at 06:05

## 2019-01-01 RX ADMIN — Medication 20 MILLIGRAM(S): at 05:41

## 2019-01-01 RX ADMIN — ATORVASTATIN CALCIUM 10 MILLIGRAM(S): 80 TABLET, FILM COATED ORAL at 22:19

## 2019-01-01 RX ADMIN — LIDOCAINE 1 PATCH: 4 CREAM TOPICAL at 16:56

## 2019-01-01 RX ADMIN — LIDOCAINE 1 PATCH: 4 CREAM TOPICAL at 23:27

## 2019-01-01 RX ADMIN — Medication 8 UNIT(S): at 17:09

## 2019-01-01 RX ADMIN — Medication 1 APPLICATION(S): at 17:38

## 2019-01-01 RX ADMIN — OXYCODONE HYDROCHLORIDE 5 MILLIGRAM(S): 5 TABLET ORAL at 22:52

## 2019-01-01 RX ADMIN — MORPHINE SULFATE 1 MILLIGRAM(S): 50 CAPSULE, EXTENDED RELEASE ORAL at 20:41

## 2019-01-01 RX ADMIN — OXYCODONE HYDROCHLORIDE 5 MILLIGRAM(S): 5 TABLET ORAL at 08:35

## 2019-01-01 RX ADMIN — CHLORHEXIDINE GLUCONATE 1 APPLICATION(S): 213 SOLUTION TOPICAL at 05:48

## 2019-01-01 RX ADMIN — PANTOPRAZOLE SODIUM 40 MILLIGRAM(S): 20 TABLET, DELAYED RELEASE ORAL at 06:20

## 2019-01-01 RX ADMIN — MORPHINE SULFATE 1 MILLIGRAM(S): 50 CAPSULE, EXTENDED RELEASE ORAL at 00:06

## 2019-01-01 RX ADMIN — Medication 125 MICROGRAM(S): at 05:05

## 2019-01-01 RX ADMIN — Medication 650 MILLIGRAM(S): at 21:09

## 2019-01-01 RX ADMIN — LIDOCAINE 1 PATCH: 4 CREAM TOPICAL at 11:24

## 2019-01-01 RX ADMIN — Medication 125 MICROGRAM(S): at 05:54

## 2019-01-01 RX ADMIN — LIDOCAINE 1 PATCH: 4 CREAM TOPICAL at 23:47

## 2019-01-01 RX ADMIN — Medication 25 MILLIGRAM(S): at 05:50

## 2019-01-01 RX ADMIN — Medication 9 UNIT(S): at 07:54

## 2019-01-01 RX ADMIN — Medication 1 APPLICATION(S): at 17:15

## 2019-01-01 RX ADMIN — Medication 30 MILLIGRAM(S): at 23:35

## 2019-01-01 RX ADMIN — AMLODIPINE BESYLATE 5 MILLIGRAM(S): 2.5 TABLET ORAL at 15:52

## 2019-01-01 RX ADMIN — ATORVASTATIN CALCIUM 10 MILLIGRAM(S): 80 TABLET, FILM COATED ORAL at 22:07

## 2019-01-01 RX ADMIN — Medication 1: at 07:56

## 2019-01-01 RX ADMIN — Medication 125 MILLIGRAM(S): at 23:54

## 2019-01-01 RX ADMIN — LIDOCAINE 1 PATCH: 4 CREAM TOPICAL at 11:44

## 2019-01-01 RX ADMIN — LIDOCAINE 1 PATCH: 4 CREAM TOPICAL at 12:30

## 2019-01-01 RX ADMIN — Medication 650 MILLIGRAM(S): at 22:51

## 2019-01-01 RX ADMIN — DOPAMINE HYDROCHLORIDE 7.2 MICROGRAM(S)/KG/MIN: 40 INJECTION, SOLUTION, CONCENTRATE INTRAVENOUS at 10:00

## 2019-01-01 RX ADMIN — Medication 25 MILLIGRAM(S): at 21:09

## 2019-01-01 RX ADMIN — OXYCODONE AND ACETAMINOPHEN 1 TABLET(S): 5; 325 TABLET ORAL at 04:00

## 2019-01-01 RX ADMIN — Medication 25 MILLIGRAM(S): at 15:15

## 2019-01-01 RX ADMIN — MORPHINE SULFATE 1 MILLIGRAM(S): 50 CAPSULE, EXTENDED RELEASE ORAL at 05:22

## 2019-01-01 RX ADMIN — Medication 1 APPLICATION(S): at 17:03

## 2019-01-01 RX ADMIN — Medication 5 UNIT(S): at 16:48

## 2019-01-01 RX ADMIN — LIDOCAINE 1 PATCH: 4 CREAM TOPICAL at 00:37

## 2019-01-01 RX ADMIN — Medication 1: at 08:37

## 2019-01-01 RX ADMIN — LIDOCAINE 1 PATCH: 4 CREAM TOPICAL at 23:49

## 2019-01-01 RX ADMIN — Medication 30 MILLIGRAM(S): at 05:23

## 2019-01-01 RX ADMIN — MORPHINE SULFATE 2 MILLIGRAM(S): 50 CAPSULE, EXTENDED RELEASE ORAL at 02:29

## 2019-01-01 RX ADMIN — Medication 125 MICROGRAM(S): at 05:41

## 2019-01-01 RX ADMIN — Medication 200 GRAM(S): at 12:13

## 2019-01-01 RX ADMIN — Medication 9 UNIT(S): at 17:43

## 2019-01-01 RX ADMIN — Medication 650 MILLIGRAM(S): at 07:10

## 2019-01-01 RX ADMIN — Medication 30 MILLIGRAM(S): at 07:10

## 2019-01-01 RX ADMIN — Medication 125 MICROGRAM(S): at 05:34

## 2019-01-01 RX ADMIN — Medication 4 UNIT(S): at 00:36

## 2019-01-01 RX ADMIN — Medication 125 MICROGRAM(S): at 06:37

## 2019-01-01 RX ADMIN — Medication 650 MILLIGRAM(S): at 00:17

## 2019-01-01 RX ADMIN — Medication 1 APPLICATION(S): at 06:48

## 2019-01-01 RX ADMIN — Medication 650 MILLIGRAM(S): at 13:26

## 2019-01-01 RX ADMIN — LIDOCAINE 1 PATCH: 4 CREAM TOPICAL at 13:25

## 2019-01-01 RX ADMIN — LIDOCAINE 1 PATCH: 4 CREAM TOPICAL at 11:15

## 2019-01-01 RX ADMIN — CHLORHEXIDINE GLUCONATE 1 APPLICATION(S): 213 SOLUTION TOPICAL at 07:06

## 2019-01-01 RX ADMIN — Medication 5 UNIT(S): at 17:50

## 2019-01-01 RX ADMIN — Medication 8 UNIT(S): at 07:56

## 2019-01-01 RX ADMIN — Medication 20 MILLIGRAM(S): at 06:52

## 2019-01-01 RX ADMIN — HEPARIN SODIUM 8 UNIT(S)/HR: 5000 INJECTION INTRAVENOUS; SUBCUTANEOUS at 16:50

## 2019-01-01 RX ADMIN — Medication 3: at 11:51

## 2019-01-01 RX ADMIN — Medication 5 UNIT(S): at 12:49

## 2019-01-01 RX ADMIN — CHLORHEXIDINE GLUCONATE 1 APPLICATION(S): 213 SOLUTION TOPICAL at 06:53

## 2019-01-01 RX ADMIN — Medication 30 MILLIGRAM(S): at 05:54

## 2019-01-01 RX ADMIN — LIDOCAINE 1 PATCH: 4 CREAM TOPICAL at 12:16

## 2019-01-01 RX ADMIN — LIDOCAINE 1 PATCH: 4 CREAM TOPICAL at 12:01

## 2019-01-01 RX ADMIN — Medication 20 MILLIGRAM(S): at 23:36

## 2019-01-01 RX ADMIN — Medication 20 MILLIGRAM(S): at 06:08

## 2019-01-01 RX ADMIN — ATORVASTATIN CALCIUM 10 MILLIGRAM(S): 80 TABLET, FILM COATED ORAL at 21:59

## 2019-01-01 RX ADMIN — LIDOCAINE 1 PATCH: 4 CREAM TOPICAL at 19:40

## 2019-01-01 RX ADMIN — Medication 1000 MILLILITER(S): at 07:49

## 2019-01-01 RX ADMIN — MORPHINE SULFATE 1 MILLIGRAM(S): 50 CAPSULE, EXTENDED RELEASE ORAL at 07:22

## 2019-01-01 RX ADMIN — LIDOCAINE 1 PATCH: 4 CREAM TOPICAL at 21:04

## 2019-01-01 RX ADMIN — NYSTATIN CREAM 1 APPLICATION(S): 100000 CREAM TOPICAL at 15:21

## 2019-01-01 RX ADMIN — SODIUM CHLORIDE 50 MILLILITER(S): 9 INJECTION INTRAMUSCULAR; INTRAVENOUS; SUBCUTANEOUS at 13:26

## 2019-01-01 RX ADMIN — Medication 125 MICROGRAM(S): at 06:22

## 2019-01-01 RX ADMIN — Medication 25 MILLIGRAM(S): at 14:02

## 2019-01-01 RX ADMIN — Medication 125 MILLIGRAM(S): at 05:42

## 2019-01-01 RX ADMIN — LIDOCAINE 1 PATCH: 4 CREAM TOPICAL at 11:46

## 2019-01-01 RX ADMIN — MORPHINE SULFATE 1 MILLIGRAM(S): 50 CAPSULE, EXTENDED RELEASE ORAL at 06:49

## 2019-01-01 RX ADMIN — Medication 25 MILLIGRAM(S): at 21:55

## 2019-01-01 RX ADMIN — Medication 650 MILLIGRAM(S): at 05:12

## 2019-01-01 RX ADMIN — MORPHINE SULFATE 1 MILLIGRAM(S): 50 CAPSULE, EXTENDED RELEASE ORAL at 11:45

## 2019-01-01 RX ADMIN — Medication 125 MILLIGRAM(S): at 06:51

## 2019-01-01 RX ADMIN — Medication 650 MILLIGRAM(S): at 05:36

## 2019-01-01 RX ADMIN — Medication 650 MILLIGRAM(S): at 15:21

## 2019-01-01 RX ADMIN — Medication 650 MILLIGRAM(S): at 15:48

## 2019-01-01 RX ADMIN — Medication 125 MILLIGRAM(S): at 11:15

## 2019-01-01 RX ADMIN — CEFEPIME 100 MILLIGRAM(S): 1 INJECTION, POWDER, FOR SOLUTION INTRAMUSCULAR; INTRAVENOUS at 15:15

## 2019-01-01 RX ADMIN — Medication 20 MILLIGRAM(S): at 06:37

## 2019-01-01 RX ADMIN — PANTOPRAZOLE SODIUM 40 MILLIGRAM(S): 20 TABLET, DELAYED RELEASE ORAL at 05:34

## 2019-01-01 RX ADMIN — Medication 25 MILLIGRAM(S): at 06:08

## 2019-01-01 RX ADMIN — LIDOCAINE 1 PATCH: 4 CREAM TOPICAL at 00:19

## 2019-01-01 RX ADMIN — Medication 5 UNIT(S): at 08:19

## 2019-01-01 RX ADMIN — Medication 1 APPLICATION(S): at 17:34

## 2019-01-01 RX ADMIN — Medication 1 APPLICATION(S): at 05:49

## 2019-01-01 RX ADMIN — LIDOCAINE 1 PATCH: 4 CREAM TOPICAL at 23:11

## 2019-01-01 RX ADMIN — Medication 125 MILLIGRAM(S): at 06:52

## 2019-01-01 RX ADMIN — OXYCODONE HYDROCHLORIDE 5 MILLIGRAM(S): 5 TABLET ORAL at 09:07

## 2019-01-01 RX ADMIN — Medication 650 MILLIGRAM(S): at 13:41

## 2019-01-01 RX ADMIN — Medication 125 MICROGRAM(S): at 06:17

## 2019-01-01 RX ADMIN — Medication 650 MILLIGRAM(S): at 06:08

## 2019-01-01 RX ADMIN — LIDOCAINE 1 PATCH: 4 CREAM TOPICAL at 11:47

## 2019-01-01 RX ADMIN — CEFEPIME 100 MILLIGRAM(S): 1 INJECTION, POWDER, FOR SOLUTION INTRAMUSCULAR; INTRAVENOUS at 17:03

## 2019-01-01 RX ADMIN — Medication 30 MILLIGRAM(S): at 07:07

## 2019-01-01 RX ADMIN — NYSTATIN CREAM 1 APPLICATION(S): 100000 CREAM TOPICAL at 22:12

## 2019-01-01 RX ADMIN — CEFEPIME 100 MILLIGRAM(S): 1 INJECTION, POWDER, FOR SOLUTION INTRAMUSCULAR; INTRAVENOUS at 14:45

## 2019-01-01 RX ADMIN — ATORVASTATIN CALCIUM 10 MILLIGRAM(S): 80 TABLET, FILM COATED ORAL at 21:43

## 2019-01-01 RX ADMIN — Medication 8 UNIT(S): at 08:02

## 2019-01-01 RX ADMIN — HEPARIN SODIUM 5000 UNIT(S): 5000 INJECTION INTRAVENOUS; SUBCUTANEOUS at 00:08

## 2019-01-01 RX ADMIN — PANTOPRAZOLE SODIUM 40 MILLIGRAM(S): 20 TABLET, DELAYED RELEASE ORAL at 07:11

## 2019-01-01 RX ADMIN — HEPARIN SODIUM 8 UNIT(S)/HR: 5000 INJECTION INTRAVENOUS; SUBCUTANEOUS at 16:35

## 2019-01-01 RX ADMIN — Medication 5 UNIT(S): at 13:30

## 2019-01-01 RX ADMIN — Medication 5 UNIT(S): at 12:02

## 2019-01-01 RX ADMIN — Medication 650 MILLIGRAM(S): at 09:20

## 2019-01-01 RX ADMIN — ATORVASTATIN CALCIUM 10 MILLIGRAM(S): 80 TABLET, FILM COATED ORAL at 22:00

## 2019-01-01 RX ADMIN — Medication 25 MILLIGRAM(S): at 22:29

## 2019-01-01 RX ADMIN — OXYCODONE HYDROCHLORIDE 5 MILLIGRAM(S): 5 TABLET ORAL at 16:26

## 2019-01-01 RX ADMIN — Medication 650 MILLIGRAM(S): at 21:59

## 2019-01-01 RX ADMIN — INSULIN HUMAN 10 UNIT(S): 100 INJECTION, SOLUTION SUBCUTANEOUS at 10:45

## 2019-01-01 RX ADMIN — LIDOCAINE 1 PATCH: 4 CREAM TOPICAL at 12:03

## 2019-01-01 RX ADMIN — INSULIN GLARGINE 15 UNIT(S): 100 INJECTION, SOLUTION SUBCUTANEOUS at 09:39

## 2019-01-01 RX ADMIN — Medication 125 MICROGRAM(S): at 06:08

## 2019-01-01 RX ADMIN — OXYCODONE HYDROCHLORIDE 5 MILLIGRAM(S): 5 TABLET ORAL at 18:11

## 2019-01-01 RX ADMIN — ATORVASTATIN CALCIUM 10 MILLIGRAM(S): 80 TABLET, FILM COATED ORAL at 21:36

## 2019-01-01 RX ADMIN — INSULIN GLARGINE 15 UNIT(S): 100 INJECTION, SOLUTION SUBCUTANEOUS at 09:52

## 2019-01-01 RX ADMIN — Medication 1 APPLICATION(S): at 07:22

## 2019-01-01 RX ADMIN — Medication 5 UNIT(S): at 12:37

## 2019-01-01 RX ADMIN — Medication 650 MILLIGRAM(S): at 05:54

## 2019-01-01 RX ADMIN — Medication 650 MILLIGRAM(S): at 22:31

## 2019-01-01 RX ADMIN — SODIUM POLYSTYRENE SULFONATE 15 GRAM(S): 4.1 POWDER, FOR SUSPENSION ORAL at 11:24

## 2019-01-01 RX ADMIN — LOSARTAN POTASSIUM 50 MILLIGRAM(S): 100 TABLET, FILM COATED ORAL at 05:06

## 2019-01-01 RX ADMIN — MORPHINE SULFATE 1 MILLIGRAM(S): 50 CAPSULE, EXTENDED RELEASE ORAL at 14:01

## 2019-01-01 RX ADMIN — Medication 5 UNIT(S): at 16:52

## 2019-01-01 RX ADMIN — Medication 8 UNIT(S): at 17:03

## 2019-01-01 RX ADMIN — HEPARIN SODIUM 9 UNIT(S)/HR: 5000 INJECTION INTRAVENOUS; SUBCUTANEOUS at 21:55

## 2019-01-01 RX ADMIN — Medication 1 APPLICATION(S): at 05:06

## 2019-01-01 RX ADMIN — OXYCODONE HYDROCHLORIDE 5 MILLIGRAM(S): 5 TABLET ORAL at 08:47

## 2019-01-01 RX ADMIN — OXYCODONE HYDROCHLORIDE 5 MILLIGRAM(S): 5 TABLET ORAL at 08:05

## 2019-01-01 RX ADMIN — Medication 1 APPLICATION(S): at 18:01

## 2019-01-01 RX ADMIN — Medication 5 UNIT(S): at 00:41

## 2019-01-01 RX ADMIN — CEFEPIME 100 MILLIGRAM(S): 1 INJECTION, POWDER, FOR SOLUTION INTRAMUSCULAR; INTRAVENOUS at 18:43

## 2019-01-01 RX ADMIN — CHLORHEXIDINE GLUCONATE 1 APPLICATION(S): 213 SOLUTION TOPICAL at 05:35

## 2019-01-01 RX ADMIN — INSULIN GLARGINE 32 UNIT(S): 100 INJECTION, SOLUTION SUBCUTANEOUS at 07:21

## 2019-01-01 RX ADMIN — Medication 25 MILLIGRAM(S): at 17:32

## 2019-01-01 RX ADMIN — HEPARIN SODIUM 0.08 UNIT(S)/HR: 5000 INJECTION INTRAVENOUS; SUBCUTANEOUS at 10:00

## 2019-01-01 RX ADMIN — CHLORHEXIDINE GLUCONATE 1 APPLICATION(S): 213 SOLUTION TOPICAL at 05:33

## 2019-01-01 RX ADMIN — NYSTATIN CREAM 1 APPLICATION(S): 100000 CREAM TOPICAL at 22:30

## 2019-01-01 RX ADMIN — HYDROMORPHONE HYDROCHLORIDE 0.5 MILLIGRAM(S): 2 INJECTION INTRAMUSCULAR; INTRAVENOUS; SUBCUTANEOUS at 13:58

## 2019-01-01 RX ADMIN — MORPHINE SULFATE 1 MILLIGRAM(S): 50 CAPSULE, EXTENDED RELEASE ORAL at 04:22

## 2019-01-01 RX ADMIN — Medication 1 APPLICATION(S): at 17:47

## 2019-01-01 RX ADMIN — Medication 25 MILLIGRAM(S): at 05:54

## 2019-01-01 RX ADMIN — SODIUM CHLORIDE 50 MILLILITER(S): 9 INJECTION INTRAMUSCULAR; INTRAVENOUS; SUBCUTANEOUS at 14:16

## 2019-01-01 RX ADMIN — Medication 1 APPLICATION(S): at 18:33

## 2019-01-01 RX ADMIN — ATORVASTATIN CALCIUM 10 MILLIGRAM(S): 80 TABLET, FILM COATED ORAL at 21:10

## 2019-01-01 RX ADMIN — Medication 20 MILLIGRAM(S): at 06:21

## 2019-01-01 RX ADMIN — Medication 100 MILLIGRAM(S): at 23:41

## 2019-01-01 RX ADMIN — Medication 1 APPLICATION(S): at 06:20

## 2019-01-01 RX ADMIN — INSULIN GLARGINE 5 UNIT(S): 100 INJECTION, SOLUTION SUBCUTANEOUS at 22:11

## 2019-01-01 RX ADMIN — Medication 60 MILLIGRAM(S): at 05:41

## 2019-01-01 RX ADMIN — OXYCODONE HYDROCHLORIDE 5 MILLIGRAM(S): 5 TABLET ORAL at 16:00

## 2019-01-01 RX ADMIN — Medication 1000 MILLILITER(S): at 10:38

## 2019-01-01 RX ADMIN — Medication 25 MILLIGRAM(S): at 23:00

## 2019-01-01 RX ADMIN — Medication 30 MILLIGRAM(S): at 06:08

## 2019-01-01 RX ADMIN — Medication 125 MICROGRAM(S): at 05:35

## 2019-01-01 RX ADMIN — CEFEPIME 100 MILLIGRAM(S): 1 INJECTION, POWDER, FOR SOLUTION INTRAMUSCULAR; INTRAVENOUS at 22:19

## 2019-01-01 RX ADMIN — INSULIN GLARGINE 15 UNIT(S): 100 INJECTION, SOLUTION SUBCUTANEOUS at 08:36

## 2019-01-01 RX ADMIN — Medication 1 APPLICATION(S): at 17:00

## 2019-01-01 RX ADMIN — Medication 5 UNIT(S): at 12:01

## 2019-01-01 RX ADMIN — MIDODRINE HYDROCHLORIDE 10 MILLIGRAM(S): 2.5 TABLET ORAL at 08:24

## 2019-01-01 RX ADMIN — Medication 20 MILLIGRAM(S): at 07:11

## 2019-01-01 RX ADMIN — Medication 650 MILLIGRAM(S): at 22:07

## 2019-01-01 RX ADMIN — PANTOPRAZOLE SODIUM 40 MILLIGRAM(S): 20 TABLET, DELAYED RELEASE ORAL at 05:55

## 2019-01-01 RX ADMIN — PANTOPRAZOLE SODIUM 40 MILLIGRAM(S): 20 TABLET, DELAYED RELEASE ORAL at 06:08

## 2019-01-01 RX ADMIN — HEPARIN SODIUM 9 UNIT(S)/HR: 5000 INJECTION INTRAVENOUS; SUBCUTANEOUS at 05:09

## 2019-01-01 RX ADMIN — OXYCODONE HYDROCHLORIDE 5 MILLIGRAM(S): 5 TABLET ORAL at 08:20

## 2019-01-01 RX ADMIN — Medication 650 MILLIGRAM(S): at 06:49

## 2019-01-01 RX ADMIN — Medication 20 MILLIGRAM(S): at 05:49

## 2019-01-01 RX ADMIN — HEPARIN SODIUM 8 UNIT(S)/HR: 5000 INJECTION INTRAVENOUS; SUBCUTANEOUS at 14:29

## 2019-01-01 RX ADMIN — Medication 6 UNIT(S): at 22:00

## 2019-01-01 RX ADMIN — LIDOCAINE 1 PATCH: 4 CREAM TOPICAL at 20:42

## 2019-01-01 RX ADMIN — NYSTATIN CREAM 1 APPLICATION(S): 100000 CREAM TOPICAL at 06:53

## 2019-01-01 RX ADMIN — Medication 5 UNIT(S): at 18:04

## 2019-01-01 RX ADMIN — OXYCODONE HYDROCHLORIDE 5 MILLIGRAM(S): 5 TABLET ORAL at 23:30

## 2019-01-01 RX ADMIN — Medication 5 UNIT(S): at 08:37

## 2019-01-01 RX ADMIN — Medication 5 UNIT(S): at 17:13

## 2019-01-01 RX ADMIN — Medication 25 MILLIGRAM(S): at 06:18

## 2019-01-01 RX ADMIN — Medication 9 UNIT(S): at 11:49

## 2019-01-01 RX ADMIN — Medication 25 MILLIGRAM(S): at 22:51

## 2019-01-01 RX ADMIN — Medication 50 MILLILITER(S): at 09:21

## 2019-01-01 RX ADMIN — PANTOPRAZOLE SODIUM 40 MILLIGRAM(S): 20 TABLET, DELAYED RELEASE ORAL at 07:07

## 2019-01-01 RX ADMIN — Medication 650 MILLIGRAM(S): at 03:18

## 2019-01-01 RX ADMIN — MORPHINE SULFATE 1 MILLIGRAM(S): 50 CAPSULE, EXTENDED RELEASE ORAL at 18:10

## 2019-01-01 RX ADMIN — Medication 8 UNIT(S): at 12:02

## 2019-01-01 RX ADMIN — Medication 60 MILLIGRAM(S): at 06:52

## 2019-01-01 RX ADMIN — ATORVASTATIN CALCIUM 10 MILLIGRAM(S): 80 TABLET, FILM COATED ORAL at 22:29

## 2019-01-01 RX ADMIN — INSULIN HUMAN 10 UNIT(S): 100 INJECTION, SOLUTION SUBCUTANEOUS at 16:34

## 2019-01-01 RX ADMIN — MORPHINE SULFATE 1 MILLIGRAM(S): 50 CAPSULE, EXTENDED RELEASE ORAL at 11:20

## 2019-01-01 RX ADMIN — CHLORHEXIDINE GLUCONATE 1 APPLICATION(S): 213 SOLUTION TOPICAL at 05:50

## 2019-01-01 RX ADMIN — LIDOCAINE 1 PATCH: 4 CREAM TOPICAL at 21:38

## 2019-01-01 RX ADMIN — Medication 125 MICROGRAM(S): at 05:23

## 2019-01-01 RX ADMIN — LIDOCAINE 1 PATCH: 4 CREAM TOPICAL at 00:06

## 2019-01-01 RX ADMIN — LOSARTAN POTASSIUM 50 MILLIGRAM(S): 100 TABLET, FILM COATED ORAL at 05:55

## 2019-01-01 RX ADMIN — Medication 30 MILLIGRAM(S): at 06:21

## 2019-01-01 RX ADMIN — LIDOCAINE 1 PATCH: 4 CREAM TOPICAL at 21:58

## 2019-01-01 RX ADMIN — OXYCODONE AND ACETAMINOPHEN 1 TABLET(S): 5; 325 TABLET ORAL at 02:12

## 2019-01-01 RX ADMIN — CEFEPIME 100 MILLIGRAM(S): 1 INJECTION, POWDER, FOR SOLUTION INTRAMUSCULAR; INTRAVENOUS at 17:14

## 2019-01-01 RX ADMIN — PANTOPRAZOLE SODIUM 40 MILLIGRAM(S): 20 TABLET, DELAYED RELEASE ORAL at 23:36

## 2019-01-01 RX ADMIN — Medication 25 MILLIGRAM(S): at 05:33

## 2019-01-01 RX ADMIN — Medication 4 UNIT(S): at 21:58

## 2019-01-01 RX ADMIN — Medication 25 MILLIGRAM(S): at 14:44

## 2019-01-01 RX ADMIN — MORPHINE SULFATE 1 MILLIGRAM(S): 50 CAPSULE, EXTENDED RELEASE ORAL at 20:11

## 2019-01-01 RX ADMIN — SODIUM CHLORIDE 250 MILLILITER(S): 9 INJECTION INTRAMUSCULAR; INTRAVENOUS; SUBCUTANEOUS at 10:39

## 2019-01-01 RX ADMIN — Medication 125 MILLIGRAM(S): at 17:08

## 2019-01-01 RX ADMIN — Medication 650 MILLIGRAM(S): at 05:32

## 2019-01-01 RX ADMIN — INSULIN HUMAN 10 UNIT(S): 100 INJECTION, SOLUTION SUBCUTANEOUS at 09:21

## 2019-01-01 RX ADMIN — INSULIN GLARGINE 15 UNIT(S): 100 INJECTION, SOLUTION SUBCUTANEOUS at 08:20

## 2019-01-01 RX ADMIN — Medication 125 MICROGRAM(S): at 05:48

## 2019-01-01 RX ADMIN — Medication 6 UNIT(S): at 22:29

## 2019-01-01 RX ADMIN — Medication 1 APPLICATION(S): at 19:23

## 2019-01-01 RX ADMIN — Medication 650 MILLIGRAM(S): at 16:22

## 2019-01-01 RX ADMIN — Medication 1 APPLICATION(S): at 17:01

## 2019-01-01 RX ADMIN — Medication 125 MICROGRAM(S): at 07:11

## 2019-01-01 RX ADMIN — Medication 8 UNIT(S): at 17:15

## 2019-01-01 RX ADMIN — Medication 650 MILLIGRAM(S): at 13:10

## 2019-01-01 RX ADMIN — PANTOPRAZOLE SODIUM 40 MILLIGRAM(S): 20 TABLET, DELAYED RELEASE ORAL at 06:04

## 2019-01-01 RX ADMIN — HEPARIN SODIUM 8 UNIT(S)/HR: 5000 INJECTION INTRAVENOUS; SUBCUTANEOUS at 11:56

## 2019-01-01 RX ADMIN — Medication 125 MICROGRAM(S): at 05:55

## 2019-01-01 RX ADMIN — Medication 25 MILLIGRAM(S): at 05:23

## 2019-01-01 RX ADMIN — INSULIN GLARGINE 5 UNIT(S): 100 INJECTION, SOLUTION SUBCUTANEOUS at 22:27

## 2019-01-01 RX ADMIN — OXYCODONE HYDROCHLORIDE 5 MILLIGRAM(S): 5 TABLET ORAL at 14:55

## 2019-01-01 RX ADMIN — SODIUM CHLORIDE 50 MILLILITER(S): 9 INJECTION, SOLUTION INTRAVENOUS at 11:51

## 2019-01-01 RX ADMIN — NYSTATIN CREAM 1 APPLICATION(S): 100000 CREAM TOPICAL at 18:45

## 2019-01-01 RX ADMIN — Medication 6 UNIT(S): at 22:07

## 2019-01-01 RX ADMIN — LIDOCAINE 1 PATCH: 4 CREAM TOPICAL at 11:19

## 2019-01-01 RX ADMIN — PANTOPRAZOLE SODIUM 40 MILLIGRAM(S): 20 TABLET, DELAYED RELEASE ORAL at 11:15

## 2019-01-01 RX ADMIN — NYSTATIN CREAM 1 APPLICATION(S): 100000 CREAM TOPICAL at 14:22

## 2019-01-01 RX ADMIN — CEFEPIME 100 MILLIGRAM(S): 1 INJECTION, POWDER, FOR SOLUTION INTRAMUSCULAR; INTRAVENOUS at 17:49

## 2019-01-01 RX ADMIN — Medication 5 UNIT(S): at 12:31

## 2019-01-01 RX ADMIN — Medication 650 MILLIGRAM(S): at 22:19

## 2019-01-01 RX ADMIN — OXYCODONE HYDROCHLORIDE 5 MILLIGRAM(S): 5 TABLET ORAL at 03:24

## 2019-01-01 RX ADMIN — Medication 650 MILLIGRAM(S): at 07:58

## 2019-01-01 RX ADMIN — Medication 650 MILLIGRAM(S): at 21:55

## 2019-01-01 RX ADMIN — LIDOCAINE 1 PATCH: 4 CREAM TOPICAL at 00:00

## 2019-01-01 RX ADMIN — OXYCODONE HYDROCHLORIDE 5 MILLIGRAM(S): 5 TABLET ORAL at 02:54

## 2019-01-01 RX ADMIN — LIDOCAINE 1 PATCH: 4 CREAM TOPICAL at 06:25

## 2019-01-01 RX ADMIN — ATORVASTATIN CALCIUM 10 MILLIGRAM(S): 80 TABLET, FILM COATED ORAL at 21:55

## 2019-01-01 RX ADMIN — Medication 20 MILLIGRAM(S): at 05:23

## 2019-01-01 RX ADMIN — Medication 125 MILLIGRAM(S): at 18:14

## 2019-01-01 RX ADMIN — Medication 5 UNIT(S): at 09:38

## 2019-01-01 RX ADMIN — Medication 650 MILLIGRAM(S): at 06:17

## 2019-01-01 RX ADMIN — Medication 125 MICROGRAM(S): at 05:33

## 2019-01-01 RX ADMIN — Medication 250 MILLIGRAM(S): at 23:40

## 2019-01-01 RX ADMIN — Medication 8 UNIT(S): at 11:50

## 2019-01-01 RX ADMIN — HEPARIN SODIUM 7 UNIT(S)/HR: 5000 INJECTION INTRAVENOUS; SUBCUTANEOUS at 12:47

## 2019-01-01 RX ADMIN — Medication 25 MILLIGRAM(S): at 23:35

## 2019-01-01 RX ADMIN — HEPARIN SODIUM 7 UNIT(S)/HR: 5000 INJECTION INTRAVENOUS; SUBCUTANEOUS at 14:24

## 2019-01-01 RX ADMIN — CEFEPIME 100 MILLIGRAM(S): 1 INJECTION, POWDER, FOR SOLUTION INTRAMUSCULAR; INTRAVENOUS at 17:32

## 2019-01-01 RX ADMIN — Medication 30 MILLIGRAM(S): at 06:23

## 2019-01-01 RX ADMIN — LIDOCAINE 1 PATCH: 4 CREAM TOPICAL at 12:37

## 2019-01-01 RX ADMIN — Medication 30 MILLIGRAM(S): at 05:49

## 2019-01-01 RX ADMIN — CEFTRIAXONE 100 MILLIGRAM(S): 500 INJECTION, POWDER, FOR SOLUTION INTRAMUSCULAR; INTRAVENOUS at 12:01

## 2019-01-01 RX ADMIN — HEPARIN SODIUM 8 UNIT(S)/HR: 5000 INJECTION INTRAVENOUS; SUBCUTANEOUS at 05:24

## 2019-01-01 RX ADMIN — Medication 650 MILLIGRAM(S): at 05:41

## 2019-01-01 RX ADMIN — Medication 650 MILLIGRAM(S): at 00:18

## 2019-01-01 RX ADMIN — Medication 20 MILLIGRAM(S): at 07:07

## 2019-01-01 RX ADMIN — CEFEPIME 100 MILLIGRAM(S): 1 INJECTION, POWDER, FOR SOLUTION INTRAMUSCULAR; INTRAVENOUS at 17:22

## 2019-01-01 RX ADMIN — INSULIN GLARGINE 15 UNIT(S): 100 INJECTION, SOLUTION SUBCUTANEOUS at 08:40

## 2019-01-01 RX ADMIN — Medication 650 MILLIGRAM(S): at 14:46

## 2019-01-01 RX ADMIN — SODIUM POLYSTYRENE SULFONATE 15 GRAM(S): 4.1 POWDER, FOR SUSPENSION ORAL at 21:02

## 2019-01-01 RX ADMIN — SODIUM POLYSTYRENE SULFONATE 30 GRAM(S): 4.1 POWDER, FOR SUSPENSION ORAL at 22:25

## 2019-01-01 RX ADMIN — Medication 650 MILLIGRAM(S): at 12:12

## 2019-01-01 RX ADMIN — PANTOPRAZOLE SODIUM 40 MILLIGRAM(S): 20 TABLET, DELAYED RELEASE ORAL at 06:49

## 2019-01-01 RX ADMIN — SODIUM CHLORIDE 75 MILLILITER(S): 9 INJECTION, SOLUTION INTRAVENOUS at 14:28

## 2019-01-01 RX ADMIN — LIDOCAINE 1 PATCH: 4 CREAM TOPICAL at 12:20

## 2019-01-01 RX ADMIN — NYSTATIN CREAM 1 APPLICATION(S): 100000 CREAM TOPICAL at 05:58

## 2019-01-01 RX ADMIN — HEPARIN SODIUM 8.5 UNIT(S)/HR: 5000 INJECTION INTRAVENOUS; SUBCUTANEOUS at 09:03

## 2019-01-01 RX ADMIN — Medication 5 UNIT(S): at 18:01

## 2019-01-01 RX ADMIN — Medication 125 MILLIGRAM(S): at 13:25

## 2019-01-01 RX ADMIN — ATORVASTATIN CALCIUM 10 MILLIGRAM(S): 80 TABLET, FILM COATED ORAL at 21:54

## 2019-01-01 RX ADMIN — Medication 125 MICROGRAM(S): at 05:49

## 2019-01-01 RX ADMIN — Medication 650 MILLIGRAM(S): at 13:14

## 2019-01-01 RX ADMIN — CHLORHEXIDINE GLUCONATE 1 APPLICATION(S): 213 SOLUTION TOPICAL at 06:48

## 2019-01-01 RX ADMIN — Medication 650 MILLIGRAM(S): at 04:16

## 2019-01-01 RX ADMIN — Medication 1 APPLICATION(S): at 16:54

## 2019-01-01 RX ADMIN — PANTOPRAZOLE SODIUM 40 MILLIGRAM(S): 20 TABLET, DELAYED RELEASE ORAL at 05:54

## 2019-01-01 RX ADMIN — LIDOCAINE 1 PATCH: 4 CREAM TOPICAL at 19:37

## 2019-01-01 RX ADMIN — Medication 50 MILLILITER(S): at 20:42

## 2019-01-01 RX ADMIN — INSULIN GLARGINE 15 UNIT(S): 100 INJECTION, SOLUTION SUBCUTANEOUS at 08:54

## 2019-01-01 RX ADMIN — Medication 125 MILLIGRAM(S): at 17:03

## 2019-01-01 RX ADMIN — LOSARTAN POTASSIUM 50 MILLIGRAM(S): 100 TABLET, FILM COATED ORAL at 05:35

## 2019-01-01 RX ADMIN — CEFEPIME 100 MILLIGRAM(S): 1 INJECTION, POWDER, FOR SOLUTION INTRAMUSCULAR; INTRAVENOUS at 17:08

## 2019-01-01 RX ADMIN — LOSARTAN POTASSIUM 50 MILLIGRAM(S): 100 TABLET, FILM COATED ORAL at 06:37

## 2019-01-01 RX ADMIN — LIDOCAINE 1 PATCH: 4 CREAM TOPICAL at 00:20

## 2019-01-01 RX ADMIN — OXYCODONE HYDROCHLORIDE 5 MILLIGRAM(S): 5 TABLET ORAL at 14:25

## 2019-01-01 RX ADMIN — Medication 20 MILLIGRAM(S): at 06:23

## 2019-01-01 RX ADMIN — CHLORHEXIDINE GLUCONATE 1 APPLICATION(S): 213 SOLUTION TOPICAL at 06:07

## 2019-01-01 RX ADMIN — AMLODIPINE BESYLATE 5 MILLIGRAM(S): 2.5 TABLET ORAL at 06:22

## 2019-01-01 RX ADMIN — NYSTATIN CREAM 1 APPLICATION(S): 100000 CREAM TOPICAL at 21:59

## 2019-01-01 RX ADMIN — NYSTATIN CREAM 1 APPLICATION(S): 100000 CREAM TOPICAL at 06:48

## 2019-01-01 RX ADMIN — Medication 650 MILLIGRAM(S): at 22:01

## 2019-01-01 RX ADMIN — Medication 25 MILLIGRAM(S): at 13:21

## 2019-01-01 RX ADMIN — Medication 650 MILLIGRAM(S): at 12:59

## 2019-01-01 RX ADMIN — INSULIN HUMAN 10 UNIT(S): 100 INJECTION, SOLUTION SUBCUTANEOUS at 09:52

## 2019-01-01 RX ADMIN — CHLORHEXIDINE GLUCONATE 1 APPLICATION(S): 213 SOLUTION TOPICAL at 06:23

## 2019-01-01 RX ADMIN — Medication 60 MILLIGRAM(S): at 15:53

## 2019-01-01 RX ADMIN — Medication 20 MILLIGRAM(S): at 06:00

## 2019-01-01 RX ADMIN — HEPARIN SODIUM 7 UNIT(S)/HR: 5000 INJECTION INTRAVENOUS; SUBCUTANEOUS at 21:45

## 2019-01-01 RX ADMIN — Medication 40 MILLIGRAM(S): at 05:54

## 2019-01-01 RX ADMIN — NYSTATIN CREAM 1 APPLICATION(S): 100000 CREAM TOPICAL at 22:02

## 2019-01-01 RX ADMIN — NYSTATIN CREAM 1 APPLICATION(S): 100000 CREAM TOPICAL at 22:32

## 2019-01-01 RX ADMIN — INSULIN GLARGINE 32 UNIT(S): 100 INJECTION, SOLUTION SUBCUTANEOUS at 07:56

## 2019-01-01 RX ADMIN — NYSTATIN CREAM 1 APPLICATION(S): 100000 CREAM TOPICAL at 05:50

## 2019-01-01 RX ADMIN — PANTOPRAZOLE SODIUM 40 MILLIGRAM(S): 20 TABLET, DELAYED RELEASE ORAL at 06:18

## 2019-01-01 RX ADMIN — LIDOCAINE 1 PATCH: 4 CREAM TOPICAL at 23:00

## 2019-01-01 RX ADMIN — Medication 1 APPLICATION(S): at 06:04

## 2019-01-01 RX ADMIN — LIDOCAINE 1 PATCH: 4 CREAM TOPICAL at 11:07

## 2019-01-01 RX ADMIN — Medication 1 APPLICATION(S): at 05:25

## 2019-01-01 RX ADMIN — ATORVASTATIN CALCIUM 10 MILLIGRAM(S): 80 TABLET, FILM COATED ORAL at 21:09

## 2019-01-01 RX ADMIN — NYSTATIN CREAM 1 APPLICATION(S): 100000 CREAM TOPICAL at 13:10

## 2019-01-01 RX ADMIN — Medication 5 UNIT(S): at 08:54

## 2019-01-01 RX ADMIN — Medication 20 MILLIGRAM(S): at 06:18

## 2019-01-01 RX ADMIN — CEFEPIME 100 MILLIGRAM(S): 1 INJECTION, POWDER, FOR SOLUTION INTRAMUSCULAR; INTRAVENOUS at 17:33

## 2019-01-01 RX ADMIN — Medication 650 MILLIGRAM(S): at 06:20

## 2019-01-01 RX ADMIN — Medication 650 MILLIGRAM(S): at 06:00

## 2019-01-01 RX ADMIN — CHLORHEXIDINE GLUCONATE 1 APPLICATION(S): 213 SOLUTION TOPICAL at 05:07

## 2019-01-01 RX ADMIN — CEFEPIME 100 MILLIGRAM(S): 1 INJECTION, POWDER, FOR SOLUTION INTRAMUSCULAR; INTRAVENOUS at 15:57

## 2019-01-01 RX ADMIN — Medication 25 MILLIGRAM(S): at 15:35

## 2019-01-01 RX ADMIN — Medication 650 MILLIGRAM(S): at 06:04

## 2019-01-01 RX ADMIN — MORPHINE SULFATE 1 MILLIGRAM(S): 50 CAPSULE, EXTENDED RELEASE ORAL at 18:13

## 2019-01-01 RX ADMIN — Medication 1 APPLICATION(S): at 06:52

## 2019-01-01 RX ADMIN — Medication 650 MILLIGRAM(S): at 08:35

## 2019-01-01 RX ADMIN — LIDOCAINE 1 PATCH: 4 CREAM TOPICAL at 23:36

## 2019-01-01 RX ADMIN — Medication 7 UNIT(S): at 11:08

## 2019-01-01 RX ADMIN — HEPARIN SODIUM 8.5 UNIT(S)/HR: 5000 INJECTION INTRAVENOUS; SUBCUTANEOUS at 23:55

## 2019-01-01 RX ADMIN — Medication 125 MICROGRAM(S): at 23:35

## 2019-01-01 RX ADMIN — Medication 650 MILLIGRAM(S): at 13:36

## 2019-01-01 RX ADMIN — MORPHINE SULFATE 2 MILLIGRAM(S): 50 CAPSULE, EXTENDED RELEASE ORAL at 03:01

## 2019-01-01 RX ADMIN — Medication 20 MILLIGRAM(S): at 06:03

## 2019-01-01 RX ADMIN — LOSARTAN POTASSIUM 50 MILLIGRAM(S): 100 TABLET, FILM COATED ORAL at 06:22

## 2019-01-01 RX ADMIN — Medication 8 UNIT(S): at 07:21

## 2019-01-01 RX ADMIN — SODIUM POLYSTYRENE SULFONATE 30 GRAM(S): 4.1 POWDER, FOR SUSPENSION ORAL at 10:38

## 2019-01-01 RX ADMIN — SODIUM CHLORIDE 50 MILLILITER(S): 9 INJECTION, SOLUTION INTRAVENOUS at 19:19

## 2019-01-01 RX ADMIN — PANTOPRAZOLE SODIUM 40 MILLIGRAM(S): 20 TABLET, DELAYED RELEASE ORAL at 06:22

## 2019-01-01 RX ADMIN — OXYCODONE AND ACETAMINOPHEN 1 TABLET(S): 5; 325 TABLET ORAL at 03:01

## 2019-01-01 RX ADMIN — OXYCODONE HYDROCHLORIDE 5 MILLIGRAM(S): 5 TABLET ORAL at 22:22

## 2019-01-01 RX ADMIN — CHLORHEXIDINE GLUCONATE 1 APPLICATION(S): 213 SOLUTION TOPICAL at 05:53

## 2019-01-01 RX ADMIN — DOPAMINE HYDROCHLORIDE 6 MICROGRAM(S)/KG/MIN: 40 INJECTION, SOLUTION, CONCENTRATE INTRAVENOUS at 09:26

## 2019-01-01 RX ADMIN — Medication 5 UNIT(S): at 19:02

## 2019-01-01 RX ADMIN — Medication 1 APPLICATION(S): at 17:08

## 2019-01-01 RX ADMIN — LIDOCAINE 1 PATCH: 4 CREAM TOPICAL at 01:30

## 2019-01-01 RX ADMIN — Medication 20 MILLIGRAM(S): at 06:22

## 2019-01-01 RX ADMIN — DOPAMINE HYDROCHLORIDE 6 MICROGRAM(S)/KG/MIN: 40 INJECTION, SOLUTION, CONCENTRATE INTRAVENOUS at 01:30

## 2019-01-01 RX ADMIN — Medication 8 UNIT(S): at 11:55

## 2019-01-01 RX ADMIN — CEFTRIAXONE 100 MILLIGRAM(S): 500 INJECTION, POWDER, FOR SOLUTION INTRAMUSCULAR; INTRAVENOUS at 12:38

## 2019-01-01 RX ADMIN — Medication 650 MILLIGRAM(S): at 22:27

## 2019-01-01 RX ADMIN — Medication 650 MILLIGRAM(S): at 21:51

## 2019-01-01 RX ADMIN — CHLORHEXIDINE GLUCONATE 1 APPLICATION(S): 213 SOLUTION TOPICAL at 05:41

## 2019-01-01 RX ADMIN — Medication 5 UNIT(S): at 18:45

## 2019-01-01 RX ADMIN — HEPARIN SODIUM 9 UNIT(S)/HR: 5000 INJECTION INTRAVENOUS; SUBCUTANEOUS at 08:02

## 2019-01-01 RX ADMIN — SODIUM CHLORIDE 50 MILLILITER(S): 9 INJECTION INTRAMUSCULAR; INTRAVENOUS; SUBCUTANEOUS at 12:39

## 2019-01-01 RX ADMIN — ATORVASTATIN CALCIUM 10 MILLIGRAM(S): 80 TABLET, FILM COATED ORAL at 21:32

## 2019-01-01 RX ADMIN — Medication 125 MILLIGRAM(S): at 05:58

## 2019-01-01 RX ADMIN — HEPARIN SODIUM 1100 UNIT(S)/HR: 5000 INJECTION INTRAVENOUS; SUBCUTANEOUS at 23:39

## 2019-01-01 RX ADMIN — OXYCODONE HYDROCHLORIDE 5 MILLIGRAM(S): 5 TABLET ORAL at 09:37

## 2019-01-01 RX ADMIN — Medication 650 MILLIGRAM(S): at 14:15

## 2019-01-01 RX ADMIN — AMPICILLIN SODIUM AND SULBACTAM SODIUM 200 GRAM(S): 250; 125 INJECTION, POWDER, FOR SUSPENSION INTRAMUSCULAR; INTRAVENOUS at 06:51

## 2019-01-01 RX ADMIN — Medication 3: at 17:00

## 2019-01-01 RX ADMIN — Medication 1 APPLICATION(S): at 06:23

## 2019-01-01 RX ADMIN — Medication 25 MILLIGRAM(S): at 15:45

## 2019-01-01 RX ADMIN — CHLORHEXIDINE GLUCONATE 1 APPLICATION(S): 213 SOLUTION TOPICAL at 05:58

## 2019-01-01 RX ADMIN — Medication 650 MILLIGRAM(S): at 06:52

## 2019-01-01 RX ADMIN — Medication 5 UNIT(S): at 11:06

## 2019-01-01 RX ADMIN — MEROPENEM 100 MILLIGRAM(S): 1 INJECTION INTRAVENOUS at 06:38

## 2019-01-01 RX ADMIN — PIPERACILLIN AND TAZOBACTAM 200 GRAM(S): 4; .5 INJECTION, POWDER, LYOPHILIZED, FOR SOLUTION INTRAVENOUS at 23:41

## 2019-01-01 RX ADMIN — Medication 125 MILLIGRAM(S): at 00:55

## 2019-01-01 RX ADMIN — Medication 50 MILLILITER(S): at 10:45

## 2019-01-01 RX ADMIN — OXYCODONE HYDROCHLORIDE 5 MILLIGRAM(S): 5 TABLET ORAL at 15:33

## 2019-01-01 RX ADMIN — OXYCODONE AND ACETAMINOPHEN 1 TABLET(S): 5; 325 TABLET ORAL at 03:37

## 2019-01-01 RX ADMIN — Medication 20 MILLIGRAM(S): at 05:27

## 2019-01-01 RX ADMIN — Medication 650 MILLIGRAM(S): at 06:21

## 2019-01-01 RX ADMIN — Medication 25 MILLIGRAM(S): at 13:36

## 2019-01-01 RX ADMIN — HEPARIN SODIUM 8 UNIT(S)/HR: 5000 INJECTION INTRAVENOUS; SUBCUTANEOUS at 05:35

## 2019-01-01 RX ADMIN — LIDOCAINE 1 PATCH: 4 CREAM TOPICAL at 23:02

## 2019-01-01 RX ADMIN — LOSARTAN POTASSIUM 50 MILLIGRAM(S): 100 TABLET, FILM COATED ORAL at 05:24

## 2019-01-01 RX ADMIN — Medication 25 MILLIGRAM(S): at 21:59

## 2019-01-01 RX ADMIN — ATORVASTATIN CALCIUM 10 MILLIGRAM(S): 80 TABLET, FILM COATED ORAL at 23:35

## 2019-01-01 RX ADMIN — Medication 650 MILLIGRAM(S): at 14:02

## 2019-01-01 RX ADMIN — Medication 125 MICROGRAM(S): at 06:52

## 2019-01-01 RX ADMIN — SODIUM POLYSTYRENE SULFONATE 30 GRAM(S): 4.1 POWDER, FOR SUSPENSION ORAL at 20:42

## 2019-01-01 RX ADMIN — PANTOPRAZOLE SODIUM 40 MILLIGRAM(S): 20 TABLET, DELAYED RELEASE ORAL at 06:52

## 2019-01-01 RX ADMIN — Medication 20 MILLIGRAM(S): at 05:54

## 2019-01-01 RX ADMIN — OXYCODONE HYDROCHLORIDE 5 MILLIGRAM(S): 5 TABLET ORAL at 15:30

## 2019-01-01 RX ADMIN — OXYCODONE HYDROCHLORIDE 5 MILLIGRAM(S): 5 TABLET ORAL at 02:24

## 2019-01-01 RX ADMIN — Medication 5 UNIT(S): at 12:12

## 2019-01-01 RX ADMIN — ATORVASTATIN CALCIUM 10 MILLIGRAM(S): 80 TABLET, FILM COATED ORAL at 22:51

## 2019-01-01 RX ADMIN — CEFEPIME 100 MILLIGRAM(S): 1 INJECTION, POWDER, FOR SOLUTION INTRAMUSCULAR; INTRAVENOUS at 18:01

## 2019-01-01 RX ADMIN — NYSTATIN CREAM 1 APPLICATION(S): 100000 CREAM TOPICAL at 13:26

## 2019-01-01 RX ADMIN — INSULIN GLARGINE 5 UNIT(S): 100 INJECTION, SOLUTION SUBCUTANEOUS at 22:31

## 2019-01-01 RX ADMIN — Medication 650 MILLIGRAM(S): at 13:21

## 2019-01-01 RX ADMIN — Medication 8 UNIT(S): at 17:20

## 2019-01-01 RX ADMIN — Medication 1000 MILLILITER(S): at 09:52

## 2019-01-01 RX ADMIN — CHLORHEXIDINE GLUCONATE 1 APPLICATION(S): 213 SOLUTION TOPICAL at 05:55

## 2019-01-01 RX ADMIN — Medication 125 MILLIGRAM(S): at 17:00

## 2019-01-01 RX ADMIN — Medication 8 UNIT(S): at 11:21

## 2019-01-01 RX ADMIN — Medication 1 APPLICATION(S): at 05:37

## 2019-01-01 RX ADMIN — Medication 125 MILLIGRAM(S): at 11:19

## 2019-01-01 RX ADMIN — PANTOPRAZOLE SODIUM 40 MILLIGRAM(S): 20 TABLET, DELAYED RELEASE ORAL at 08:47

## 2019-01-01 RX ADMIN — Medication 20 MILLIGRAM(S): at 05:55

## 2019-01-01 RX ADMIN — OXYCODONE HYDROCHLORIDE 5 MILLIGRAM(S): 5 TABLET ORAL at 18:41

## 2019-01-01 RX ADMIN — CHLORHEXIDINE GLUCONATE 1 APPLICATION(S): 213 SOLUTION TOPICAL at 06:04

## 2019-01-01 RX ADMIN — INSULIN GLARGINE 36 UNIT(S): 100 INJECTION, SOLUTION SUBCUTANEOUS at 07:54

## 2019-01-01 RX ADMIN — Medication 3: at 17:03

## 2019-09-09 NOTE — ED PROVIDER NOTE - CLINICAL SUMMARY MEDICAL DECISION MAKING FREE TEXT BOX
I personally evaluated the patient. I reviewed the Resident’s or Physician Assistant’s note (as assigned above), and agree with the findings and plan except as documented in my note. 82 yo F with PMHx of HTN, HLD, DM, RA, hypothyroidism, CKD and anemia presents to the ED BIB EMS c/o frequent falls and frequent urination. Signed out to me by Dr. sen, patient while under Dr. khan had a stemi activation and as per cardiology not a cath lab candidate at this time, evaluated by urology for ct findings, no surgical intervention, lactate improved, admitted for further eval and treatment.

## 2019-09-09 NOTE — ED PROVIDER NOTE - NS ED ROS FT
Review of Systems  Constitutional:  No fever, chills.  Eyes:  No visual changes, eye pain, or discharge.  ENMT:  No hearing changes, pain, or discharge. No nasal congestion, discharge, or bleeding. No throat pain, swelling, or difficulty swallowing.  Cardiac:  No chest pain, palpitations, syncope, or edema.  Respiratory:  No dyspnea, cough. No hemoptysis.  GI:  No nausea, vomiting, diarrhea, or abdominal pain.   :  No dysuria, hematuria, or burning. (+) frequency  MS:  No back pain.  Skin:  No skin rash, pruritis, jaundice, or lesions.  Neuro:  No headache, dizziness, loss of sensation, or focal weakness.  No change in mental status.

## 2019-09-09 NOTE — ED ADULT TRIAGE NOTE - CHIEF COMPLAINT QUOTE
BIBA from home weakness, decreased ambulation, urinary and fecal incontinence, poor appetite, multiples falls, b/l leg infection, hx of DM, fs 370

## 2019-09-09 NOTE — ED PROVIDER NOTE - ATTENDING CONTRIBUTION TO CARE
82 yo neri with pmh of DM, anemia, CKD, high chol, depression, RA who presents to the ER for weakness, decreased ambulation at home and frequent falls. States she fell from her chair x 2 today. Pt thinks she may have hit head but denies any blood thinners. Pt's neighbor came in later in course of care and states pt lives in a disheveled home with son who has developmental delay, describes her and son as "hoarders", and not being taken care of since the  . Pt also notes increased urine frequency, and constipation. Denies SOB/CP/abdomen pain/sweats/N/V/D. Pt on exam NCAT, throat clear, lungs ctab, Heart reg s1s2, abdomen soft obese, nt/nd +BS, no masses Skin/Ext pt covered in bruises to arms and LLQ area, and anterior shins with cellulitis and skin ulcerations. +B/L leg swelling. Neuro motor and sensory intact, no facial droop, no slurred speech. Pt very tearful and appears depressed tamera when talking about . Will check labs, ekg, xray, CT scan head/cspine/abdomen, Venous duplex of LE, and reassess. 84 yo neri with pmh of DM, anemia, CKD, high chol, depression, RA who presents to the ER for weakness, decreased ambulation at home and frequent falls. States she fell from her chair x 2 today. Pt thinks she may have hit head but denies any blood thinners. Pt's neighbor came in later in course of care and states pt lives in a disheveled home with son who has developmental delay, describes her and son as "hoarders", and not being taken care of since the  . Pt also notes increased urine frequency, and constipation. Denies SOB/CP/abdomen pain/sweats/N/V/D. Pt on exam NCAT, throat clear, lungs ctab, Heart reg s1s2, abdomen soft obese, nt/nd +BS, no masses Skin/Ext pt covered in bruises to arms and LLQ area, and anterior shins with cellulitis and skin ulcerations. +B/L leg swelling tamera from lower shins to feet with bruising and discoloration to right first-third toes, +DP pulse intact,  Neuro motor and sensory intact, no facial droop, no slurred speech. Pt very tearful and appears depressed tamera when talking about . Will check labs, ekg, xray, CT scan head/cspine/abdomen, Venous duplex of LE, and reassess.

## 2019-09-09 NOTE — ED PROVIDER NOTE - CARE PLAN
Principal Discharge DX:	Renal failure  Secondary Diagnosis:	Acute deep vein thrombosis (DVT) of femoral vein of right lower extremity  Secondary Diagnosis:	Cellulitis Principal Discharge DX:	Emphysematous cystitis  Secondary Diagnosis:	Acute deep vein thrombosis (DVT) of femoral vein of right lower extremity  Secondary Diagnosis:	Cellulitis  Secondary Diagnosis:	Acute kidney injury  Secondary Diagnosis:	Elevated troponin  Secondary Diagnosis:	Poor social situation  Secondary Diagnosis:	Frequent falls

## 2019-09-09 NOTE — ED PROVIDER NOTE - OBJECTIVE STATEMENT
82 yo F with PMHx of HTN, HLD, DM, RA, hypothyroidism, CKD and anemia presents to the ED BIB EMS c/o frequent falls and frequent urination. Pt has had increasing amount of falls over the past few days and today she feel twice trying to get out of her chair--unsure if she hit her head. Pt states she has also been urinating frequently at night. Pt lives at home with her son who is unable to provide adequate care for her. According to neighbor who feels pt is unsafe to go home. Pt denies fever, chills, nausea, vomiting, abdominal pain, diarrhea, headache, dizziness, chest pain, SOB, back pain, LOC, cough, calf pain/swelling, recent travel, recent surgery.

## 2019-09-09 NOTE — ED PROVIDER NOTE - PROGRESS NOTE DETAILS
STEMI code called as pt had no previous ekg to compare, and had abnormal ekg (called by ER staff). Cardiac fellow at bedside and evaluated the patient and based on that eval, STEMI code canceled. Check labs, repeat ekg, xray and reassess. Venous duplex --> Positive for RIGHT CFV DVT Venous duplex --> Positive for RIGHT CFV DVT.  Pt to be started on heparin drip (currently still getting radiological studies) spoke to surgery and urology . Spoke to vascular--place ace bandage to right upper thigh and order arterial duplex. upgraded to Crit given findings on scan. upgraded to Crit given findings on scan. Pt transferred to Dr Berry

## 2019-09-09 NOTE — ED ADULT NURSE NOTE - OBJECTIVE STATEMENT
pt biba for decreased ambulation, urinary / fecal incontinence. bilateral lower leg swelling. pt presents with confusion, unable to provide medical hx. denies complaints at this time.  code stemi called for pt for changes in ekg in comparison to last ekg here. code stemi cancelled as per cardiology team at bedside. pt sent for ct. iv in place, labs sent. Will continue to monitor/ assess pt biba for decreased ambulation, urinary / fecal incontinence. bilateral lower leg swelling. pt presents with confusion, alert to self but unable to provide medical hx. denies complaints at this time.  code stemi called for pt for changes in ekg in comparison to last ekg here. code stemi cancelled as per cardiology team at bedside. pt sent for ct. iv in place, labs sent. pt returned from ct, incontinent care provided. pt has redness/ s1 to sacrum. s2 to left lower calf and right lower calf.  pt with bruising to left hip / left arm.  pt moved to critical care for dvt, hyperkalemia as per PA. rpt vbg being sent. report endorsed to RN in critical care.

## 2019-09-09 NOTE — CHART NOTE - NSCHARTNOTEFT_GEN_A_CORE
STEMI code was called and I responded by physically arriving and examining the patient at the bedside in the ER. Pt. is an 84 YO F with PMH of DLD, DM, RA, brought in from home with multiple medical complaints including lower extremity weakness, multiple falls, generalized weakness, urinary and fecal incontinence, poor appetite, and elevated blood sugar. EKG shows Sinus bradycardia with 1st degree AV block and ST elevation in I and aVL. Pt. is hemodynamically stable, and denies any chest pain, pressure, sob, palpitations, AMATO, orthopnea, or PND. Case was discussed with Interventional Cardiologist on call (Dr. Hui) and STEMI code was cancelled. ER team recommended to r/o ACS with serial EKG and cardiac enzymes.     EKG: Sinus bradycardia with 1st degree AV block and ST elevation in I and aVL

## 2019-09-09 NOTE — ED PROVIDER NOTE - PHYSICAL EXAMINATION
VITAL SIGNS: I have reviewed nursing notes and confirm.  CONSTITUTIONAL: Elderly female laying on stretcher; in no acute distress.  SKIN: Skin exam is warm and dry, no acute rash. (+) diffuse bruising  HEAD: Normocephalic; atraumatic.  EYES: PERRL, EOM intact; conjunctiva and sclera clear.  ENT: No nasal discharge; airway clear.   NECK: Supple; non tender.  CARD: S1, S2 normal; no murmurs, gallops, or rubs. Regular rate and rhythm.  RESP: No wheezes, rales or rhonchi.   ABD: Normal bowel sounds; soft; non-distended; (+) mild LLQ TTP; No rebound or guarding. No CVA tenderness.  BACK: No midline TTP.   EXT: Normal ROM. (+) erythema, swelling, and ulcerations to b/l distal LE. DP 2+ B/L.   NEURO: Alert, oriented. Grossly unremarkable. No focal deficits.

## 2019-09-09 NOTE — ED PROVIDER NOTE - SECONDARY DIAGNOSIS.
Acute deep vein thrombosis (DVT) of femoral vein of right lower extremity Cellulitis Acute kidney injury Poor social situation Frequent falls Elevated troponin

## 2019-09-09 NOTE — CONSULT NOTE ADULT - SUBJECTIVE AND OBJECTIVE BOX
Patient is an 84 year old female presenting to the ED with her neighbor for multiple medical complaints. She is an unkempt , malnousihed elderly lady. The neighbor states she falls frequently and is not being taken car of. She has bilateral lower extremity swelling and was found to have a right common femoral dvt.               Past Medical History/ Surgical History:  Renal failure  Cellulitis  DM  Hypothyroidism  HTN  CKD     Allergies:No Known Allergies    Medications:ceFAZolin   IVPB 1000 milliGRAM(s) IV Intermittent once  sodium chloride 0.9% Bolus 1000 milliLiter(s) IV Bolus once      Physical Exam:  Vitals:T(C): 34.7 (09-09-19 @ 18:13), Max: 34.7 (09-09-19 @ 18:13)  HR: 78 (09-09-19 @ 18:13) (78 - 78)  BP: 146/80 (09-09-19 @ 18:13) (146/80 - 146/80)  RR: 18 (09-09-19 @ 18:13) (18 - 18)  SpO2: 96% (09-09-19 @ 18:13) (96% - 96%)    General: Alert and oriented times 3 , Not in acute distress   Heart: Regular rate and rhythm , no rubs murmurs or gallops  Lungs: Clear to auscultation , no wheezes , rales rhonci or adventicious breath sounds  Abdomen: Soft , positive bowel sounds, non tender, non distended, no peritoneal signs  Extemities: Bilateral lower extremity edema , warm, capillary refill, no swelling , no edema, good motor and sensation positive pulses , bilateral lower extremity ulcers     venous duplex: right commonf femoral dvt         Assesment and Plan:  Patient is a 83y Female presenting with right common femoral dvt    Attending to see   Start heparin drip  Trend PTT  Arterial duplex   wrap right lower extremity in  ace wrap   elevate leg  medical management     ALTAGRACIA Matthews  09-09-19 @ 22:45  #[id number]/ [id number]

## 2019-09-10 NOTE — PATIENT PROFILE ADULT - ABUSE/NEGLECT DETAILS
patient unkempt, covered in urine and feces, neighbor claims they are hoarders and she needs placement

## 2019-09-10 NOTE — H&P ADULT - ASSESSMENT
83 year old female with PMH of DM II, anemia, CKD, DLD, RA, depression brought in by EMS from home for weakness, decreased ambulation, frequent falls and frequent urination. Admitted with multiple morbidities    # Emphysematous cystitis  - Sepsis ruled out on admission, WBC 20  - Frequent urination, UA positive, BALDEV  - CT A/P showed emphysematous cystitis  - s/p Ancef, Zosyn and vancomycin in ED  - Urology eval: no intervention  - Start Meropenem 500 q12 (renally dosed)  - f/u urine cultures  - Infectious disease eval    # Abnormal EKG with elevated troponin  - s/p code STEMI in ED - cancelled  - Pt asymptomatic, unknown baseline EKG  - Possibly due to BALDEV on CKD  - Serial troponin and EKG; check CPK and CKMB  - Check 2D-echo    # Right common femoral DVT  - Vascular consult appreciated  - Continue Heparin infusion for now  - Monitor PTT    # BALDEV on CKD 3  - Cr 12/28/2018 was 1.3, now 2.4  - Possibly prerenal - s/p 1 liter IV bolus  - Monitor Cr  - Avoid nephrotoxic agens  - If worsens or not improving, consider nephrology eval    # Common bile duct choledocholith  - Denies pain, and unremarkable physical exam  - Mild transaminitis  - Trend LFTs, if worsens or not improving, consider GI eval    # Bilateral shins ulceration with erythema and discharge  - Left shin culture sent - follow up  - s/p Ancef, Vanco and Zosyn in ED  - On Meropenem  - Infectious disease eval  - Burn eval    # Weakness and frequent falls  - Likely due to multiple comorbidities  - Consult PT/rehab when pt acuity improves    # DM II - elevated  - Glucose 420 on arrival - likely component of noncompliance and on prednisone  - Check HbA1C  - Start basal and bolus insulin regimen  - Monitor Fingersticks  - Carb consistent diet    # Rheumatoid arthritis  - On Prednisone    # DLD - Continue statin    # Elder self-neglect  - Unsafe living condition  - Social work assessment  - Needs placement    GI ppx: Protonix  DVT ppx/tx: on Heparin infusion    Dispo: from home, needs placement 83 year old female with PMH of DM II, anemia, CKD, DLD, RA, depression brought in by EMS from home for weakness, decreased ambulation, frequent falls and frequent urination. Admitted with multiple morbidities    # Emphysematous cystitis  - Sepsis ruled out on admission, WBC 20  - Frequent urination, UA positive, BALDEV  - CT A/P showed emphysematous cystitis  - s/p Ancef, Zosyn and vancomycin in ED  - Urology eval: no intervention  - Start Meropenem 500 q12 (renally dosed)  - f/u urine cultures  - Infectious disease eval    # Abnormal EKG with elevated troponin  - s/p code STEMI in ED - cancelled  - Pt asymptomatic, unknown baseline EKG  - Possibly due to BALDEV on CKD  - Serial troponin and EKG; check CPK and CKMB  - Check 2D-echo    # Right common femoral DVT  - Vascular consult appreciated  - Continue Heparin infusion for now  - Monitor PTT  - Arterial duplex ordered, f/u    # BALDEV on CKD 3  - Cr 12/28/2018 was 1.3, now 2.4  - Possibly prerenal - s/p 1 liter IV bolus  - Monitor Cr  - Avoid nephrotoxic agents  - If worsens or not improving, consider nephrology eval    # Common bile duct choledocholith  - Denies pain, and unremarkable physical exam  - Mild transaminitis  - Surgical eval: no surgical intervention  - Trend LFTs, if worsens or not improving, consider GI eval    # Bilateral shins ulceration with erythema and discharge  - Left shin culture sent - follow up  - s/p Ancef, Vanco and Zosyn in ED  - On Meropenem  - Infectious disease eval  - Burn eval    # Weakness and frequent falls  - Likely due to multiple comorbidities  - Consult PT/rehab when pt acuity improves    # DM II - elevated  - Glucose 420 on arrival - likely component of noncompliance and on prednisone  - Check HbA1C  - Start basal and bolus insulin regimen  - Monitor Fingersticks  - Carb consistent diet    # Rheumatoid arthritis  - On Prednisone    # DLD - Continue statin    # Elder self-neglect  - Unsafe living condition  - Social work assessment  - Needs placement    GI ppx: Protonix  DVT ppx/tx: on Heparin infusion    Dispo: from home, needs placement 83 year old female with PMH of DM II, anemia, CKD, DLD, RA, depression brought in by EMS from home for weakness, decreased ambulation, frequent falls and frequent urination. Admitted with multiple comorbidities    # Emphysematous cystitis  - Sepsis ruled out on admission, WBC 20  - Frequent urination, UA positive, BALDEV  - CT A/P showed emphysematous cystitis  - s/p Ancef, Zosyn and vancomycin in ED  - Urology eval: no intervention  - Start Meropenem 500 q12 (renally dosed)  - f/u urine cultures  - Infectious disease eval    # Abnormal EKG with elevated troponin  - s/p code STEMI in ED - cancelled  - Pt asymptomatic, unknown baseline EKG  - Possibly due to BALDEV on CKD  - Serial troponin and EKG; check CPK and CKMB  - Check 2D-echo    # Right common femoral DVT  - Vascular consult appreciated  - Continue Heparin infusion for now  - Monitor PTT  - Arterial duplex ordered, f/u    # BALDEV on CKD 3  - Cr 12/28/2018 was 1.3, now 2.4  - Possibly prerenal - s/p 1 liter IV bolus  - Monitor Cr  - Avoid nephrotoxic agents  - If worsens or not improving, consider nephrology eval    # Common bile duct choledocholith  - Denies pain, and unremarkable physical exam  - Mild transaminitis  - Surgical eval: no surgical intervention  - Trend LFTs, if worsens or not improving, consider GI eval    # Bilateral shins ulceration with erythema and discharge  - Left shin culture sent - follow up  - s/p Ancef, Vanco and Zosyn in ED  - On Meropenem  - Infectious disease eval  - Burn eval    # Weakness and frequent falls  - Likely due to multiple comorbidities  - Consult PT/rehab when pt acuity improves    # DM II - elevated  - Glucose 420 on arrival - likely component of noncompliance and on prednisone  - Check HbA1C  - Start basal and bolus insulin regimen  - Monitor Fingersticks  - Carb consistent diet    # Rheumatoid arthritis  - On Prednisone    # DLD - Continue statin    # Elder self-neglect  - Unsafe living condition  - Social work assessment  - Needs placement    GI ppx: Protonix  DVT ppx/tx: on Heparin infusion    Dispo: from home, needs placement 83 year old female with PMH of DM II, anemia, CKD, DLD, RA, depression brought in by EMS from home for weakness, decreased ambulation, frequent falls and frequent urination. Admitted with multiple comorbidities    # Emphysematous cystitis  - Sepsis ruled out on admission, WBC 20  - Frequent urination, UA positive, BALDEV  - CT A/P showed emphysematous cystitis  - s/p Ancef, Zosyn and vancomycin in ED  - Urology eval: no intervention  - Start Meropenem 500 q12 (renally dosed)  - f/u urine cultures  - Infectious disease eval    # Abnormal EKG with elevated troponin  - s/p code STEMI in ED - cancelled  - Pt asymptomatic, unknown baseline EKG  - Possibly due to BALDEV on CKD  - Serial troponin and EKG; check CPK and CKMB  - Check 2D-echo    # Right common femoral DVT  - Vascular consult appreciated  - Continue Heparin infusion for now  - Monitor PTT  - Arterial duplex ordered, f/u    # BALDEV on CKD 3  - Cr 12/28/2018 was 1.3, now 2.4  - Possibly prerenal - s/p 1 liter IV bolus  - Monitor Cr  - Avoid nephrotoxic agents  - If worsens or not improving, consider nephrology eval    # Common bile duct choledocholith  - Denies pain, and unremarkable physical exam  - Mild transaminitis  - Surgical eval: no surgical intervention  - Trend LFTs, if worsens or not improving, consider GI eval    # Bilateral shins ulceration with erythema and discharge  - Left shin purulence culture sent - follow up  - s/p Ancef, Vanco and Zosyn in ED  - On Meropenem  - Infectious disease eval  - Burn eval    # Weakness and frequent falls  - Likely due to multiple comorbidities  - Trauma workup in ED negative for any fractures  - Consult PT/rehab when pt's acuity improves    # DM II - elevated  - Glucose 420 on arrival - likely component of noncompliance and on prednisone  - Check HbA1C  - Start basal and bolus insulin regimen  - Monitor Fingersticks  - Carb consistent diet    # Rheumatoid arthritis  - On Prednisone    # DLD - Continue statin    # Elder self-neglect  - Unsafe living condition  - Social work assessment  - Needs placement    GI ppx: Protonix  DVT ppx/tx: on Heparin infusion    Dispo: from home, needs placement

## 2019-09-10 NOTE — H&P ADULT - ATTENDING COMMENTS
A 84 yo female with PMH of HTH, DM II, CKD, Hypothyroidism, RA, depression brought in by EMS from home for weakness, decreased ambulation, frequent falls and frequent urination. Patient c/o weakness in her legs and she fell twice at her home, she also has urinary and fecal incontinence, she denies abdominal pain, diarrhea or fever, no chest pain or SOB, she reports worsening LE edema with ulcerative lesion on right leg.  In the ED her vital signs were stable, EKG showed mild EL on I, aVL, STEMI called canceled by cardiology, troponin 0.03, CT A/P showed emphysematous cystitis, cholelithiasis and 1.6 cm distal CBD choledocholith. Duplex of lower extremities was positive for right common femoral DVT    PHYSICAL EXAM:  GENERAL: NAD, well-developed  HEAD:  Atraumatic, Normocephalic  EYES: EOMI, PERRLA, conjunctiva and sclera clear  NECK: Supple, No JVD  CHEST/LUNG: Clear to auscultation bilaterally; No wheeze  HEART: Regular rate and rhythm; S1 S2  ABDOMEN: Soft, Nontender, Nondistended; Bowel sounds present  EXTREMITIES:  2+ Peripheral Pulses, No clubbing, cyanosis, or edema  PSYCH: Alert, oriented to place   NEUROLOGY: non-focal  SKIN: No rashes or lesions    A/P:   Emphysematous cystitis:   Started on  Meropenem.   ID consult, pending urine and blood Cx.     Right common femoral DVT  Continue Heparin infusion for now  May switch to oral anticoagulation if no surgery plan.     BALDEV on CKD 3  Cr 12/28/2018 was 1.3, now 2.4  ATN vs pre-reanl  Start on IV fluid,   Monitor I/o, BMP.     Choledocholithiasis:  patient is asymptomatic.   Abdomen CT showed Cholelithiasis and 1.6 cm distal CBD choledocholith.  Surgery consult recommended no intervention.     RLE ulcerative lesion with cellulitis:  pending arterial duplex  Burn consult. Continue Antibiotics.     DM II  FS is elevated  Start on Lantus and sliding scale. Check A1C.     Rheumatoid arthritis  Noted with joint deformities on hands.   On Prednisone    Frequent fall  PT and rehba  SW involved for safe disposition A 84 yo female with PMH of HTH, DM II, CKD, Hypothyroidism, RA, depression brought in by EMS from home for weakness, decreased ambulation, frequent falls and frequent urination. Patient c/o weakness in her legs and she fell twice at her home, she also has urinary and fecal incontinence, she denies abdominal pain, diarrhea or fever, no chest pain or SOB, she reports worsening LE edema with ulcerative lesion on right leg.  In the ED her vital signs were stable, EKG showed mild EL on I, aVL, STEMI called canceled by cardiology, troponin 0.03, CT A/P showed emphysematous cystitis, cholelithiasis and 1.6 cm distal CBD choledocholith. Duplex of lower extremities was positive for right common femoral DVT    PHYSICAL EXAM:  GENERAL: NAD, well-developed  HEAD:  Atraumatic, Normocephalic  EYES: EOMI, PERRLA, conjunctiva and sclera clear  NECK: Supple, No JVD  CHEST/LUNG: Clear to auscultation bilaterally; No wheeze  HEART: Regular rate and rhythm; S1 S2  ABDOMEN: Soft, Nontender, Nondistended; Bowel sounds present  EXTREMITIES:  2+ Peripheral Pulses, No clubbing, cyanosis, or edema  PSYCH: Alert, oriented to place   NEUROLOGY: non-focal  SKIN: No rashes or lesions    A/P:   Emphysematous cystitis:   Started on  Meropenem.   ID consult, pending urine and blood Cx.     Right common femoral DVT  Continue Heparin infusion for now  May switch to oral anticoagulation if no surgery plan.     BALDEV on CKD 3  Cr 12/28/2018 was 1.3, now 2.4  ATN vs pre-reanl  Start on IV fluid,   Monitor I/o, BMP.     Choledocholithiasis:  patient is asymptomatic.   Abdomen CT showed Cholelithiasis and 1.6 cm distal CBD choledocholith.  Surgery consult recommended no intervention.     Lower extremities ulcerative lesion with cellulitis: R>L  pending arterial duplex  Continue Antibiotics. Burn consult    DM II  FS is elevated  Start on Lantus and sliding scale. Check A1C.     Rheumatoid arthritis  Noted with joint deformities on hands.   On Prednisone    Frequent fall  PT and rehba  SW involved for safe disposition

## 2019-09-10 NOTE — H&P ADULT - NSICDXPASTMEDICALHX_GEN_ALL_CORE_FT
PAST MEDICAL HISTORY:  Chronic kidney disease (CKD)     Depression     Diabetes mellitus     Dyslipidemia     Hypertension

## 2019-09-10 NOTE — CONSULT NOTE ADULT - ASSESSMENT

## 2019-09-10 NOTE — H&P ADULT - NSHPLABSRESULTS_GEN_ALL_CORE
13.6   19.92 )-----------( 370      ( 09 Sep 2019 20:05 )             41.7           133<L>  |  100  |  90<HH>  ----------------------------<  420<H>  6.0<HH>   |  18  |  2.4<H>    Ca    9.7      09 Sep 2019 20:05  Mg     2.2         TPro  5.4<L>  /  Alb  2.5<L>  /  TBili  0.6  /  DBili  x   /  AST  39  /  ALT  53<H>  /  AlkPhos  181<H>                Urinalysis Basic - ( 10 Sep 2019 00:10 )    Color: Yellow / Appearance: Slightly Turbid / S.020 / pH: x  Gluc: x / Ketone: Trace  / Bili: Negative / Urobili: <2 mg/dL   Blood: x / Protein: 300 mg/dL / Nitrite: Positive   Leuk Esterase: Negative / RBC: 30 /HPF / WBC 3 /HPF   Sq Epi: x / Non Sq Epi: 0 /HPF / Bacteria: Many    PT/INR - ( 09 Sep 2019 20:50 )   PT: 9.80 sec;   INR: 0.85 ratio       PTT - ( 09 Sep 2019 20:50 )  PTT:23.4 sec    Lactate Trend   @ 20:05 Lactate:1.7     CARDIAC MARKERS ( 09 Sep 2019 20:50 )  x     / 0.03 ng/mL / x     / x     / x        CAPILLARY BLOOD GLUCOSE  POCT Blood Glucose.: 362 mg/dL (10 Sep 2019 02:48)    --------    < from: CT Head No Cont (19 @ 21:49) >    IMPRESSION:    No CT evidence of acute intracranial pathology.    Chronic microvascular ischemic changes and chronic lacunar infarct.    < end of copied text >    < from: CT Cervical Spine No Cont (19 @ 21:50) >    IMPRESSION:     No evidence of acute cervical spine fracture.     Severe multilevel degenerative disc disease.    < end of copied text >    < from: CT Abdomen and Pelvis No Cont (19 @ 21:51) >    IMPRESSION:    Emphysematous cystitis.     Cholelithiasis and 1.6 cm distal CBD choledocholith.    < end of copied text >    < from: US Abdomen Limited (09.10.19 @ 01:54) >    IMPRESSION:    Limited evaluation of the gallbladder fundus secondary to limited patient   positioning. Cholelithiasis with questionable borderline wall thickening,   partially visualized. No appreciable pericholecystic fluid and negative   reported sonographic Reza's sign. No definitive sonographic criteria   for acute cholecystitis. Clinical correlation can be made.    Biliary ductal dilatation presumably secondary to patient's known   choledocholith.    < end of copied text >

## 2019-09-10 NOTE — CONSULT NOTE ADULT - SUBJECTIVE AND OBJECTIVE BOX
HPI:  83 year old female with PMH of DM II, anemia, CKD, DLD, RA, depression brought in by EMS from home for weakness, decreased ambulation, frequent falls and frequent urination. Patient reports that she had increased falls since her left leg is weak and she fell today twice from the chair. Patient is a poor historian. As per ED documentation, unsure if she had head trauma. Patient's neighbor arrived in ED during workup and states that patient is living in a disheveled home with a son who has developmental delay, described them as "hoarders" and since pt's   about 2 years ago, they are not taken care. Pt also complains of increased urinary frequency and constipation.     During workup in ED, code STEMI was called for EKG showing snus bradycardia with 1st degree AV block and ST elevation in I and aVL. Code STEMI was cancelled as pt was asymptomatic and hemodynamically stable.    Trauma workup in ED was negative for any fractures, however several findings were revealed during workup: CT A/P showed emphysematous cystitis, cholelithiasis and 1.6 cm distal CBD choledocholith. Duplex of lower extremities was positive for right common femoral DVT. (10 Sep 2019 03:34)      PAST MEDICAL & SURGICAL HISTORY:  Depression  Chronic kidney disease (CKD)  Dyslipidemia  Hypertension  Diabetes mellitus      Hospital Course:    TODAY'S SUBJECTIVE & REVIEW OF SYMPTOMS:     Constitutional WNL   Cardio WNL   Resp WNL   GI WNL  Heme WNL  Endo WNL  Skin WNL  MSK Weakness  Neuro WNL  Cognitive WNL  Psych WNL      MEDICATIONS  (STANDING):  ampicillin/sulbactam  IVPB 3 Gram(s) IV Intermittent once  ampicillin/sulbactam  IVPB      atorvastatin 10 milliGRAM(s) Oral at bedtime  chlorhexidine 4% Liquid 1 Application(s) Topical <User Schedule>  dextrose 5%. 1000 milliLiter(s) (50 mL/Hr) IV Continuous <Continuous>  dextrose 50% Injectable 12.5 Gram(s) IV Push once  dextrose 50% Injectable 25 Gram(s) IV Push once  dextrose 50% Injectable 25 Gram(s) IV Push once  heparin  Infusion 11 Unit(s)/Hr (8 mL/Hr) IV Continuous <Continuous>  insulin glargine Injectable (LANTUS) 15 Unit(s) SubCutaneous every morning  insulin lispro Injectable (HumaLOG) 5 Unit(s) SubCutaneous three times a day before meals  levothyroxine 125 MICROGram(s) Oral daily  losartan 50 milliGRAM(s) Oral daily  pantoprazole    Tablet 40 milliGRAM(s) Oral before breakfast  predniSONE   Tablet 20 milliGRAM(s) Oral daily    MEDICATIONS  (PRN):  dextrose 40% Gel 15 Gram(s) Oral once PRN Blood Glucose LESS THAN 70 milliGRAM(s)/deciliter  glucagon  Injectable 1 milliGRAM(s) IntraMuscular once PRN Glucose LESS THAN 70 milligrams/deciliter      FAMILY HISTORY:      Allergies    No Known Allergies    Intolerances        SOCIAL HISTORY:    [  ] Etoh  [  ] Smoking  [  ] Substance abuse     Home Environment:  [  ] Home Alone  [ x ] Lives with Family  [  ] Home Health Aid    Dwelling:  [  ] Apartment  [x  ] Private House  [  ] Adult Home  [  ] Skilled Nursing Facility      [  ] Short Term  [  ] Long Term  [ x ] Stairs       Elevator [  ]    FUNCTIONAL STATUS PTA: (Check all that apply)  Ambulation: [   ]Independent    [ x ] Dependent     [  ] Non-Ambulatory  Assistive Device: [  ] SA Cane  [  ]  Q Cane  [ x ] Walker  [  ]  Wheelchair  ADL : [  ] Independent  [ x ]  Dependent       Vital Signs Last 24 Hrs  T(C): 36.2 (10 Sep 2019 03:18), Max: 36.2 (10 Sep 2019 03:18)  T(F): 97.1 (10 Sep 2019 03:18), Max: 97.1 (10 Sep 2019 03:18)  HR: 64 (10 Sep 2019 06:41) (64 - 78)  BP: 161/69 (10 Sep 2019 06:41) (138/76 - 161/69)  BP(mean): --  RR: 18 (10 Sep 2019 06:41) (18 - 18)  SpO2: 98% (10 Sep 2019 06:41) (96% - 98%)      PHYSICAL EXAM: Alert & awake  GENERAL: NAD  HEAD:  Atraumatic, Normocephalic  CHEST/LUNG: Clear   HEART: S1S2+  ABDOMEN: Soft, Nontender  EXTREMITIES:  no calf tenderness, + edema leeanna le    NERVOUS SYSTEM:  Cranial Nerves 2-12 intact [  ] Abnormal  [  ]  ROM: WFL all extremities [  ]  Abnormal [ x ]limited right shoulder  Motor Strength: WFL all extremities  [  ]  Abnormal [x  ]limited right shoulder   Sensation: intact to light touch [  ] Abnormal [  ]  Reflexes: Symmetric [  ]  Abnormal [  ]    FUNCTIONAL STATUS:  Bed Mobility: Independent [  ]  Supervision [  ]  Needs Assistance [x  ]  N/A [  ]  Transfers: Independent [  ]  Supervision [  ]  Needs Assistance [x  ]  N/A [  ]   Ambulation: Independent [  ]  Supervision [  ]  Needs Assistance [  ]  N/A [  ]  ADL: Independent [  ] Requires Assistance [  ] N/A [  ]      LABS:                        12.6   19.10 )-----------( 317      ( 10 Sep 2019 14:30 )             38.7         133<L>  |  100  |  90<HH>  ----------------------------<  420<H>  6.0<HH>   |  18  |  2.4<H>    Ca    9.7      09 Sep 2019 20:05  Mg     2.2         TPro  5.4<L>  /  Alb  2.5<L>  /  TBili  0.6  /  DBili  x   /  AST  39  /  ALT  53<H>  /  AlkPhos  181<H>      PT/INR - ( 09 Sep 2019 20:50 )   PT: 9.80 sec;   INR: 0.85 ratio         PTT - ( 10 Sep 2019 14:30 )  PTT:>200.0 sec  Urinalysis Basic - ( 10 Sep 2019 00:10 )    Color: Yellow / Appearance: Slightly Turbid / S.020 / pH: x  Gluc: x / Ketone: Trace  / Bili: Negative / Urobili: <2 mg/dL   Blood: x / Protein: 300 mg/dL / Nitrite: Positive   Leuk Esterase: Negative / RBC: 30 /HPF / WBC 3 /HPF   Sq Epi: x / Non Sq Epi: 0 /HPF / Bacteria: Many        RADIOLOGY & ADDITIONAL STUDIES:    Assesment:

## 2019-09-10 NOTE — CONSULT NOTE ADULT - ASSESSMENT
ASSESSMENT:  83y Female with multiple comorbidities, presented to the hospital for a fall. She was worked up with a CT ap and surgery was consulted for choledocholith.     PLAN:   No acute surgical intervention  c/s GI for choledocholithiasis  f/u urology for emphysematous cystitis  f/u vascular surgery for DVT and splenic artery aneurysm  ivf  abx  close monitoring in icu setting is recommended     Above plan discussed with Dr. Ling , patient, and blue team    --------------------------------------------------------------------------------------  Senior Resident Note  84yo f here for falls found to have choledocholithiasis among many other complaints   npo ivf  gi  abx for cystitis  Pt seen and examined  Above note has been reviewed and edited  Plan d/w patient and Dr Sushil Lobo

## 2019-09-10 NOTE — CONSULT NOTE ADULT - SUBJECTIVE AND OBJECTIVE BOX
HPI: Pt is an 84y/o F presenting to ED for weakness and falls. Urology consulted 2/2 CT scan findings of emphysematous cystitis. States she has some urinary frequency; Pt denies any fever, nausea, vomiting, chills, flank pain at this time.     REVIEW OF SYSTEMS:    CONSTITUTIONAL: no  fevers or chills  HEENT: No visual changes  ENDO: No sweating  NECK: No pain or stiffness  MUSCULOSKELETAL: No back pain, no joint pain  RESPIRATORY: No shortness of breath  CARDIOVASCULAR: No chest pain  GASTROINTESTINAL: No abdominal or epigastric pain. No nausea, vomiting,  No diarrhea or constipation.   NEUROLOGICAL: No mental status changes  PSYCH: No depression, no mood changes  SKIN: No itching      MEDICATIONS  (STANDING):  heparin  Infusion.  Unit(s)/Hr (11 mL/Hr) IV Continuous <Continuous>    Allergies  No Known Allergies    SOCIAL HISTORY: No illicit drug use    Vital Signs Last 24 Hrs  T(C): 34.7 (09 Sep 2019 18:13), Max: 34.7 (09 Sep 2019 18:13)  T(F): 94.4 (09 Sep 2019 18:13), Max: 94.4 (09 Sep 2019 18:13)  HR: 70 (10 Sep 2019 00:26) (70 - 78)  BP: 140/78 (10 Sep 2019 00:26) (140/78 - 146/80)  RR: 18 (10 Sep 2019 00:26) (18 - 18)  SpO2: 96% (10 Sep 2019 00:26) (96% - 96%)    PHYSICAL EXAM:  Constitutional: awake, alert, NAD, disheveled  Back: No CVA tenderness  Respiratory: No accessory respiratory muscle use  Abd: soft, nontender, nondistended  : bladder nonpalpable  Extremities: no edema  Neurological: A/O x 3  Psychiatric: Normal mood, normal affect  Skin: No rashes    I&O's Summary      LABS:                        13.6   19.92 )-----------( 370      ( 09 Sep 2019 20:05 )             41.7         133<L>  |  100  |  90<HH>  ----------------------------<  420<H>  6.0<HH>   |  18  |  2.4<H>    Ca    9.7      09 Sep 2019 20:05  Mg     2.2         TPro  5.4<L>  /  Alb  2.5<L>  /  TBili  0.6  /  DBili  x   /  AST  39  /  ALT  53<H>  /  AlkPhos  181<H>      PT/INR - ( 09 Sep 2019 20:50 )   PT: 9.80 sec;   INR: 0.85 ratio         PTT - ( 09 Sep 2019 20:50 )  PTT:23.4 sec  Urinalysis Basic - ( 10 Sep 2019 00:10 )    Color: Yellow / Appearance: Slightly Turbid / S.020 / pH: x  Gluc: x / Ketone: Trace  / Bili: Negative / Urobili: <2 mg/dL   Blood: x / Protein: 300 mg/dL / Nitrite: Positive   Leuk Esterase: Negative / RBC: 30 /HPF / WBC 3 /HPF   Sq Epi: x / Non Sq Epi: 0 /HPF / Bacteria: Many    RADIOLOGY & ADDITIONAL STUDIES:  < from: CT Abdomen and Pelvis No Cont (19 @ 21:51) >    EXAM:  CT ABDOMEN AND PELVIS            PROCEDURE DATE:  2019            INTERPRETATION:  CLINICAL STATEMENT: Abdominal pain. Leukocytosis.    TECHNIQUE: Contiguous axial CT images were obtained from the lower chest   to the pubic symphysis without intravenous contrast.  Oral contrast was   not administered.  Reformatted images in the coronal and sagittal planes   were acquired.    COMPARISON CT: None.      FINDINGS:    LOWER CHEST: Mild bibasilar subsegmental atelectasis.    HEPATOBILIARY: Unremarkable unenhanced hepatic parenchyma.   Cholelithiasis. Dilated CBD to 1.2 cm with a 1.6 cm distal CBD   choledocholith.    SPLEEN: Unremarkable.    PANCREAS: Unremarkable.    ADRENAL GLANDS: Unremarkable.    KIDNEYS: No hydronephrosis. No renal or ureteral calculus    ABDOMINOPELVIC NODES: No enlarged abdominal or pelvic lymph nodes.    PELVIC ORGANS: Bladder wall circumferential intramural gas, compatible   with emphysematous cystitis.     PERITONEUM/MESENTERY/BOWEL: No bowel obstruction, pneumoperitoneum or   ascites. Unremarkable appendix    BONES/SOFT TISSUES: Diffuse osteopenia and degenerative changes of the   spine.    OTHER: Moderate size fat-containing periumbilical hernia. 8 mm rim   calcified splenic artery aneurysm.      IMPRESSION:    Emphysematous cystitis.     Cholelithiasis and 1.6 cm distal CBD choledocholith.    Dr. Flori Meyers discussed preliminary findings with KARINA AKHTAR on   2019 10:33 PM with readback.              FLORI MEYERS M.D., RESIDENT RADIOLOGIST  This document has been electronically signed.  SAMANTHA BRIONES M.D., ATTENDING RADIOLOGIST  This document has been electronically signed. Sep  9 2019 10:53PM

## 2019-09-10 NOTE — CONSULT NOTE ADULT - SUBJECTIVE AND OBJECTIVE BOX
Consultation Note  =====================================================    HPI: 83y Female  HPI: Patient is an 84 year old female presenting to the ED with her neighbor for multiple medical complaints. Surgery was consulted for CT findings of a 1.6 cm choledocholith. She states that she has occasional abdominal pain. However, she is not a good historian. She is using bipap at night. Denies fevers, chills, sob, chest pain, palpitations, nausea vomiting diarrhea.      PAST MEDICAL & SURGICAL HISTORY:    Home Meds: Home Medications:  atorvastatin 10 mg oral tablet: 1 tab(s) orally once a day (10 Sep 2019 01:)  diclofenac 1% topical gel:  (10 Sep 2019 01:10)  levothyroxine 125 mcg (0.125 mg) oral tablet: 1 tab(s) orally once a day (10 Sep 2019 01:)  losartan 50 mg oral tablet: 1 tab(s) orally once a day (10 Sep 2019 01:09)  predniSONE 20 mg oral tablet: 1 tab(s) orally once a day (10 Sep 2019 01:)    Allergies: Allergies    No Known Allergies    Intolerances      Soc:   Denies Tobacco/EtOH/Substance use  Advanced Directives: Presumed Full Code     ROS:    REVIEW OF SYSTEMS    [ ] A ten-point review of systems was otherwise negative except as noted.  [ ] Due to altered mental status/intubation, subjective information were not able to be obtained from the patient. History was obtained, to the extent possible, from review of the chart and collateral sources of information.    --------------------------------------------------------------------------------------    VITAL SIGNS, INS/OUTS (last 24 hours):  --------------------------------------------------------------------------------------  ICU Vital Signs Last 24 Hrs  T(C): 34.7 (09 Sep 2019 18:13), Max: 34.7 (09 Sep 2019 18:13)  T(F): 94.4 (09 Sep 2019 18:13), Max: 94.4 (09 Sep 2019 18:13)  HR: 70 (10 Sep 2019 00:26) (70 - 78)  BP: 140/78 (10 Sep 2019 00:26) (140/78 - 146/80)  BP(mean): --  ABP: --  ABP(mean): --  RR: 18 (10 Sep 2019 00:26) (18 - 18)  SpO2: 96% (10 Sep 2019 00:26) (96% - 96%)    I&O's Summary    --------------------------------------------------------------------------------------  PHYSICAL EXAM  General: NAD, AAOx3  HEENT: NCAT, EOMI, Trachea ML, Neck supple, no iceterus or signs of jaundice  Cardiac: RRR S1, S2,  Respiratory: CTAB, normal respiratory effort  Abdomen: Soft, non-distended, +bowel sounds, suprapubic tenderness, no guarding or rebound, + RUQ tenderness. Chronically incarcerated umbilical hernia without skin changes.  Neuro: Sensation grossly intact, no focal deficits  Vascular: Pulses 2+ throughout, extremities well perfused  Skin: Warm/dry, normal color, no jaundice      LABS  --------------------------------------------------------------------------------------  Labs:  CAPILLARY BLOOD GLUCOSE                              13.6   19.92 )-----------( 370      ( 09 Sep 2019 20:05 )             41.7       Auto Neutrophil %: 97.4 % (19 @ 20:05)        133<L>  |  100  |  90<HH>  ----------------------------<  420<H>  6.0<HH>   |  18  |  2.4<H>      Calcium, Total Serum: 9.7 mg/dL (19 @ 20:05)      LFTs:             5.4  | 0.6  | 39       ------------------[181     ( 09 Sep 2019 20:05 )  2.5  | x    | 53          Lipase:x      Amylase:x         Blood Gas Venous - Lactate: 2.5 mmoL/L (09-10-19 @ 00:18)  Lactate, Blood: 1.7 mmol/L (19 @ 20:05)      Coags:     9.80   ----< 0.85    ( 09 Sep 2019 20:50 )     23.4        CARDIAC MARKERS ( 09 Sep 2019 20:50 )  x     / 0.03 ng/mL / x     / x     / x          Serum Pro-Brain Natriuretic Peptide: 3503 pg/mL (19 @ 20:50)      Urinalysis Basic - ( 10 Sep 2019 00:10 )    Color: Yellow / Appearance: Slightly Turbid / S.020 / pH: x  Gluc: x / Ketone: Trace  / Bili: Negative / Urobili: <2 mg/dL   Blood: x / Protein: 300 mg/dL / Nitrite: Positive   Leuk Esterase: Negative / RBC: 30 /HPF / WBC 3 /HPF   Sq Epi: x / Non Sq Epi: 0 /HPF / Bacteria: Many          --------------------------------------------------------------------------------------  IMAGING RESULTS  < from: CT Abdomen and Pelvis No Cont (19 @ 21:51) >    Emphysematous cystitis.     Cholelithiasis and 1.6 cm distal CBD choledocholith.    < end of copied text >    ---------------------------------------------------------------------------------------    ASSESSMENT:  83y Female with multiple comorbidities, presented to the hospital for a fall. She was worked up with a CT ap and surgery was consulted for choledocholith.     PLAN:   No acute surgical intervention as patient is not a good candidate  c/s GI for choledocholithiasis  f/u urology for emphysematous cystitis  f/u vascular surgery for DVT          Above plan discussed with Dr. Ling , patient, and blue team    --------------------------------------------------------------------------------------    09-10-19 @ 01:25 Consultation Note  =====================================================    HPI: 83y Female  HPI: Patient is an 84 year old female presenting to the ED AMS, weakness, multiple falls, ed workup revealed multiple problems. Surgery was consulted for CT findings of a 1.6 cm choledocholith. She states that she has occasional abdominal pain. However, she is not a good historian. She is using bipap at night. Denies fevers, chills, sob, chest pain, palpitations, nausea vomiting diarrhea.      PAST MEDICAL & SURGICAL HISTORY:    Home Meds: Home Medications:  atorvastatin 10 mg oral tablet: 1 tab(s) orally once a day (10 Sep 2019 01:)  diclofenac 1% topical gel:  (10 Sep 2019 01:10)  levothyroxine 125 mcg (0.125 mg) oral tablet: 1 tab(s) orally once a day (10 Sep 2019 01:09)  losartan 50 mg oral tablet: 1 tab(s) orally once a day (10 Sep 2019 01:09)  predniSONE 20 mg oral tablet: 1 tab(s) orally once a day (10 Sep 2019 01:09)    Allergies: Allergies    No Known Allergies    Intolerances      Soc:   Denies Tobacco/EtOH/Substance use  Advanced Directives: Presumed Full Code     ROS:    REVIEW OF SYSTEMS    [ ] A ten-point review of systems was otherwise negative except as noted.  [ ] Due to altered mental status/intubation, subjective information were not able to be obtained from the patient. History was obtained, to the extent possible, from review of the chart and collateral sources of information.    --------------------------------------------------------------------------------------    VITAL SIGNS, INS/OUTS (last 24 hours):  --------------------------------------------------------------------------------------  ICU Vital Signs Last 24 Hrs  T(C): 34.7 (09 Sep 2019 18:13), Max: 34.7 (09 Sep 2019 18:13)  T(F): 94.4 (09 Sep 2019 18:13), Max: 94.4 (09 Sep 2019 18:13)  HR: 70 (10 Sep 2019 00:26) (70 - 78)  BP: 140/78 (10 Sep 2019 00:26) (140/78 - 146/80)  BP(mean): --  ABP: --  ABP(mean): --  RR: 18 (10 Sep 2019 00:26) (18 - 18)  SpO2: 96% (10 Sep 2019 00:26) (96% - 96%)    I&O's Summary    --------------------------------------------------------------------------------------  PHYSICAL EXAM  General: NAD, AAOx3  HEENT: NCAT, EOMI, Trachea ML, Neck supple, no iceterus or signs of jaundice  Cardiac: RRR S1, S2,  Respiratory: CTAB, normal respiratory effort  Abdomen: Soft, non-distended, +bowel sounds, suprapubic tenderness, no guarding or rebound,  no RUQ tenderness. Chronically incarcerated umbilical hernia without skin changes.  Neuro: Sensation grossly intact, no focal deficits  Vascular: Pulses 2+ throughout, extremities well perfused  Skin: Warm/dry, normal color, no jaundice      LABS  --------------------------------------------------------------------------------------  Labs:  CAPILLARY BLOOD GLUCOSE                              13.6   19.92 )-----------( 370      ( 09 Sep 2019 20:05 )             41.7       Auto Neutrophil %: 97.4 % (19 @ 20:05)        133<L>  |  100  |  90<HH>  ----------------------------<  420<H>  6.0<HH>   |  18  |  2.4<H>      Calcium, Total Serum: 9.7 mg/dL (19 @ 20:05)      LFTs:             5.4  | 0.6  | 39       ------------------[181     ( 09 Sep 2019 20:05 )  2.5  | x    | 53          Lipase:x      Amylase:x         Blood Gas Venous - Lactate: 2.5 mmoL/L (09-10-19 @ 00:18)  Lactate, Blood: 1.7 mmol/L (19 @ 20:05)      Coags:     9.80   ----< 0.85    ( 09 Sep 2019 20:50 )     23.4        CARDIAC MARKERS ( 09 Sep 2019 20:50 )  x     / 0.03 ng/mL / x     / x     / x          Serum Pro-Brain Natriuretic Peptide: 3503 pg/mL (19 @ 20:50)      Urinalysis Basic - ( 10 Sep 2019 00:10 )    Color: Yellow / Appearance: Slightly Turbid / S.020 / pH: x  Gluc: x / Ketone: Trace  / Bili: Negative / Urobili: <2 mg/dL   Blood: x / Protein: 300 mg/dL / Nitrite: Positive   Leuk Esterase: Negative / RBC: 30 /HPF / WBC 3 /HPF   Sq Epi: x / Non Sq Epi: 0 /HPF / Bacteria: Many          --------------------------------------------------------------------------------------  IMAGING RESULTS  < from: CT Abdomen and Pelvis No Cont (19 @ 21:51) >  HEPATOBILIARY: Unremarkable unenhanced hepatic parenchyma.   Cholelithiasis. Dilated CBD to 1.2 cm with a 1.6 cm distal CBD   choledocholith.      ABDOMINOPELVIC NODES: No enlarged abdominal or pelvic lymph nodes.    PELVIC ORGANS: Bladder wall circumferential intramural gas, compatible   with emphysematous cystitis.     BONES/SOFT TISSUES: Diffuse osteopenia and degenerative changes of the   spine.    OTHER: Moderate size fat-containing periumbilical hernia. 8 mm rim   calcified splenic artery aneurysm.      IMPRESSION:    Emphysematous cystitis.     Cholelithiasis and 1.6 cm distal CBD choledocholith.    < end of copied text >    ---------------------------------------------------------------------------------------      09-10-19 @ 01:25

## 2019-09-10 NOTE — CONSULT NOTE ADULT - SUBJECTIVE AND OBJECTIVE BOX
ARMANI ORTIZA  83y, Female  Allergy: No Known Allergies      CHIEF COMPLAINT: Emphysematous cystitis (10 Sep 2019 03:34)      HPI:  83 year old female with PMH of DM II, anemia, CKD, DLD, RA, depression brought in by EMS from home for weakness, decreased ambulation, frequent falls and frequent urination. Patient reports that she had increased falls since her left leg is weak and she fell today twice from the chair. Patient is a poor historian. As per ED documentation, unsure if she had head trauma. Patient's neighbor arrived in ED during workup and states that patient is living in a disheveled home with a son who has developmental delay, described them as "hoarders" and since pt's   about 2 years ago, they are not taken care. Pt also complains of increased urinary frequency and constipation.     During workup in ED, code STEMI was called for EKG showing snus bradycardia with 1st degree AV block and ST elevation in I and aVL. Code STEMI was cancelled as pt was asymptomatic and hemodynamically stable.    Trauma workup in ED was negative for any fractures, however several findings were revealed during workup: CT A/P showed emphysematous cystitis, cholelithiasis and 1.6 cm distal CBD choledocholith. Duplex of lower extremities was positive for right common femoral DVT. (10 Sep 2019 03:34)    FAMILY HISTORY:    PAST MEDICAL & SURGICAL HISTORY:  Depression  Chronic kidney disease (CKD)  Dyslipidemia  Hypertension  Diabetes mellitus      SOCIAL HISTORY    Substance Use (  ) never used  (  ) IVDU (  ) Other:  Tobacco Usage:  (   ) never smoked   (   ) former smoker   (   ) current smoker   Alcohol Usage: (   ) social  (   ) daily use (   ) denies  Sexual History:       ROS  General: Denies rigors, nightsweats  HEENT: Denies headache, rhinorrhea, sore throat, eye pain  CV: Denies CP, palpitations  PULM: Denies SOB, wheezing  GI: Denies hematemesis, hematochezia, melena  : Denies discharge, hematuria  MSK: Denies arthralgias, myalgias  SKIN: Denies rash, lesions  NEURO: Denies paresthesias, weakness  PSYCH: Denies depression, anxiety    VITALS:  T(F): 97.1, Max: 97.1 (09-10-19 @ 03:18)  HR: 64  BP: 161/69  RR: 18Vital Signs Last 24 Hrs  T(C): 36.2 (10 Sep 2019 03:18), Max: 36.2 (10 Sep 2019 03:18)  T(F): 97.1 (10 Sep 2019 03:18), Max: 97.1 (10 Sep 2019 03:18)  HR: 64 (10 Sep 2019 06:41) (64 - 78)  BP: 161/69 (10 Sep 2019 06:41) (138/76 - 161/69)  BP(mean): --  RR: 18 (10 Sep 2019 06:41) (18 - 18)  SpO2: 98% (10 Sep 2019 06:41) (96% - 98%)    PHYSICAL EXAM:  Gen: NAD, resting in bed  HEENT: Normocephalic, atraumatic  Neck: supple, no lymphadenopathy  CV: Regular rate & regular rhythm  Lungs: decreased BS at bases  Abdomen: Soft, BS present  Ext: Warm, well perfused  Neuro: non focal, awake  Skin: no rash, no erythema    TESTS & MEASUREMENTS:                        13.6   19.92 )-----------( 370      ( 09 Sep 2019 20:05 )             41.7         133<L>  |  100  |  90<HH>  ----------------------------<  420<H>  6.0<HH>   |  18  |  2.4<H>    Ca    9.7      09 Sep 2019 20:05  Mg     2.2         TPro  5.4<L>  /  Alb  2.5<L>  /  TBili  0.6  /  DBili  x   /  AST  39  /  ALT  53<H>  /  AlkPhos  181<H>      eGFR if Non African American: 18 mL/min/1.73M2 (19 @ 20:05)  eGFR if African American: 21 mL/min/1.73M2 (19 @ 20:05)    LIVER FUNCTIONS - ( 09 Sep 2019 20:05 )  Alb: 2.5 g/dL / Pro: 5.4 g/dL / ALK PHOS: 181 U/L / ALT: 53 U/L / AST: 39 U/L / GGT: x           Urinalysis Basic - ( 10 Sep 2019 00:10 )    Color: Yellow / Appearance: Slightly Turbid / S.020 / pH: x  Gluc: x / Ketone: Trace  / Bili: Negative / Urobili: <2 mg/dL   Blood: x / Protein: 300 mg/dL / Nitrite: Positive   Leuk Esterase: Negative / RBC: 30 /HPF / WBC 3 /HPF   Sq Epi: x / Non Sq Epi: 0 /HPF / Bacteria: Many          Blood Gas Venous - Lactate: 1.3 mmoL/L (09-10-19 @ 01:46)  Blood Gas Venous - Lactate: 2.5 mmoL/L (09-10-19 @ 00:18)  Lactate, Blood: 1.7 mmol/L (19 @ 20:05)      INFECTIOUS DISEASES TESTING      RADIOLOGY & ADDITIONAL TESTS:    CT Abdomen and Pelvis No Cont:      PROCEDURE DATE:  2019      INTERPRETATION:  CLINICAL STATEMENT: Abdominal pain. Leukocytosis.    FINDINGS:    LOWER CHEST: Mild bibasilar subsegmental atelectasis.    HEPATOBILIARY: Unremarkable unenhanced hepatic parenchyma.   Cholelithiasis. Dilated CBD to 1.2 cm with a 1.6 cm distal CBD   choledocholith.    SPLEEN: Unremarkable.    PANCREAS: Unremarkable.    ADRENAL GLANDS: Unremarkable.    KIDNEYS: No hydronephrosis. No renal or ureteral calculus    ABDOMINOPELVIC NODES: No enlarged abdominal or pelvic lymph nodes.    PELVIC ORGANS: Bladder wall circumferential intramural gas, compatible   with emphysematous cystitis.     PERITONEUM/MESENTERY/BOWEL: No bowel obstruction, pneumoperitoneum or   ascites. Unremarkable appendix    BONES/SOFT TISSUES: Diffuse osteopenia and degenerative changes of the   spine.    OTHER: Moderate size fat-containing periumbilical hernia. 8 mm rim   calcified splenic artery aneurysm.    IMPRESSION:    Emphysematous cystitis.   Cholelithiasis and 1.6 cm distal CBD choledocholith.      CT Cervical Spine No Cont (19 @ 21:50)    IMPRESSION:     No evidence of acute cervical spine fracture.   Severe multilevel degenerative disc disease.    CT Head No Cont (19 @ 21:49)  IMPRESSION:    No CT evidence of acute intracranial pathology.    Chronic microvascular ischemic changes and chronic lacunar infarct.    CARDIOLOGY TESTING  12 Lead ECG:   Ventricular Rate 57 BPM  Atrial Rate 57 BPM  P-R Interval 232 ms  QRS Duration 104 ms  Q-T Interval 440 ms  QTC Calculation(Bezet) 428 ms  P Axis 15 degrees  R Axis -6 degrees  T Axis -17 degrees    Diagnosis Line Sinus bradycardia with sinus arrhythmia with 1st degree A-V block  ST elevation consider lateral injury or acute infarct  ** ** ACUTE MI / STEMI ** **  Abnormal ECG    NOTIFICATION - Please notify MD and re-edit. On re-edit indicate name of  physician and date/time notified.  Confirmed by CHET BROWN MD ([id number]) on 9/10/2019 6:02:01 AM (19 @ 20:07)      All available historical records have been reviewed    MEDICATIONS  atorvastatin 10  chlorhexidine 4% Liquid 1  dextrose 5%. 1000  dextrose 50% Injectable 12.5  dextrose 50% Injectable 25  dextrose 50% Injectable 25  heparin  Infusion.   insulin glargine Injectable (LANTUS) 15  insulin lispro Injectable (HumaLOG) 5  levothyroxine 125  losartan 50  meropenem  IVPB 500  pantoprazole    Tablet 40  predniSONE   Tablet 20      ANTIBIOTICS:  meropenem  IVPB 500 milliGRAM(s) IV Intermittent every 12 hours      All available historical data has been reviewed    ASSESSMENT  83yFemale with a PMH of Type 2 DM, Anemia, CKD 3, DLD, RA, Depression brought in for weakness, decreased ambulation, frequent falls, and increased urination     IMPRESSION  #Emphysematous Cystits  #B/L LE ulceration       RECOMMENDATIONS  - f/u pending cultures  - Continue on Meropenem 500 q12 and Vancomycin 1gm q24    This is a pended note. All final recommendations to follow pending discussion with ID Attending ANGELFREDI  83y, Female  Allergy: No Known Allergies      CHIEF COMPLAINT: Emphysematous cystitis (10 Sep 2019 03:34)      HPI:  83 year old female with PMH of DM II, anemia, CKD, DLD, RA, depression brought in by EMS from home for weakness, decreased ambulation, frequent falls and frequent urination. Patient reports that she had increased falls since her left leg is weak and she fell today twice from the chair. Patient is a poor historian. As per ED documentation, unsure if she had head trauma. Patient's neighbor arrived in ED during workup and states that patient is living in a disheveled home with a son who has developmental delay, described them as "hoarders" and since pt's   about 2 years ago, they are not taken care. Pt also complains of increased urinary frequency and constipation.     During workup in ED, code STEMI was called for EKG showing snus bradycardia with 1st degree AV block and ST elevation in I and aVL. Code STEMI was cancelled as pt was asymptomatic and hemodynamically stable.    Trauma workup in ED was negative for any fractures, however several findings were revealed during workup: CT A/P showed emphysematous cystitis, cholelithiasis and 1.6 cm distal CBD choledocholith. Duplex of lower extremities was positive for right common femoral DVT. (10 Sep 2019 03:34)    Seen and examined at bedside this morning. Patient is alert and oriented but overall is a poor historian. Endorses that she has been having issues with her feet for a few months. She reports going to a doctor and getting an antibiotic for her feet but unknown if she ever took the medication as prescribed. She was lying in bed stated that she feels funny and endorsed that she is weak when ambulating with her walker. Reported no urinary complaints. The only pain she endorsed during examination was a pain in her left shoulder which originated in her right shoulder.     FAMILY HISTORY:    PAST MEDICAL & SURGICAL HISTORY:  Depression  Chronic kidney disease (CKD)  Dyslipidemia  Hypertension  Diabetes mellitus      SOCIAL HISTORY    Substance Use ( x ) never used  (  ) IVDU (  ) Other:  Tobacco Usage:  ( x ) never smoked   (   ) former smoker   (   ) current smoker   Alcohol Usage: (   ) social  (   ) daily use ( x ) denies  Sexual History: Not sexually active      ROS  General: Denies rigors, night sweats. + weakness   HEENT: Denies headache, rhinorrhea, sore throat, eye pain  CV: Denies CP, palpitations  PULM: Denies SOB, wheezing  GI: Denies hematemesis, hematochezia, melena  : Denies discharge, hematuria. +urinary frequency  MSK: Denies arthralgias, myalgias  SKIN: + Lesion on bilateral lower extremity  NEURO: Denies paresthesias, + weakness  PSYCH: Denies depression, anxiety    VITALS:  T(F): 97.1, Max: 97.1 (09-10-19 @ 03:18)  HR: 64  BP: 161/69  RR: 18Vital Signs Last 24 Hrs  T(C): 36.2 (10 Sep 2019 03:18), Max: 36.2 (10 Sep 2019 03:18)  T(F): 97.1 (10 Sep 2019 03:18), Max: 97.1 (10 Sep 2019 03:18)  HR: 64 (10 Sep 2019 06:41) (64 - 78)  BP: 161/69 (10 Sep 2019 06:41) (138/76 - 161/69)  BP(mean): --  RR: 18 (10 Sep 2019 06:41) (18 - 18)  SpO2: 98% (10 Sep 2019 06:41) (96% - 98%)    PHYSICAL EXAM:  Gen: NAD, resting in bed. Elderly and slightly disheveled appearing   HEENT: Normocephalic, atraumatic  Neck: supple, no lymphadenopathy  CV: Regular rate & regular rhythm  Lungs: Clear to ausculation, No wheezing, rhonchi, rales  Abdomen: Soft, Nontender, nondistended, BS present  Ext: Warm, well perfused  Neuro: non focal, awake  Skin: +Bilateral LE areas of blistering on bilateral shins. RLE with noted erythema from dorsum of foot to just below the knee. Erythema on hallux and darkening of second toe. LLE with noted scabbed over blisters and blackening of nail of third toe.     TESTS & MEASUREMENTS:                        13.6   19.92 )-----------( 370      ( 09 Sep 2019 20:05 )             41.7         133<L>  |  100  |  90<HH>  ----------------------------<  420<H>  6.0<HH>   |  18  |  2.4<H>    Ca    9.7      09 Sep 2019 20:05  Mg     2.2         TPro  5.4<L>  /  Alb  2.5<L>  /  TBili  0.6  /  DBili  x   /  AST  39  /  ALT  53<H>  /  AlkPhos  181<H>      eGFR if Non African American: 18 mL/min/1.73M2 (19 @ 20:05)  eGFR if African American: 21 mL/min/1.73M2 (19 @ 20:05)    LIVER FUNCTIONS - ( 09 Sep 2019 20:05 )  Alb: 2.5 g/dL / Pro: 5.4 g/dL / ALK PHOS: 181 U/L / ALT: 53 U/L / AST: 39 U/L / GGT: x           Urinalysis Basic - ( 10 Sep 2019 00:10 )    Color: Yellow / Appearance: Slightly Turbid / S.020 / pH: x  Gluc: x / Ketone: Trace  / Bili: Negative / Urobili: <2 mg/dL   Blood: x / Protein: 300 mg/dL / Nitrite: Positive   Leuk Esterase: Negative / RBC: 30 /HPF / WBC 3 /HPF   Sq Epi: x / Non Sq Epi: 0 /HPF / Bacteria: Many          Blood Gas Venous - Lactate: 1.3 mmoL/L (09-10-19 @ 01:46)  Blood Gas Venous - Lactate: 2.5 mmoL/L (09-10-19 @ 00:18)  Lactate, Blood: 1.7 mmol/L (19 @ 20:05)      INFECTIOUS DISEASES TESTING      RADIOLOGY & ADDITIONAL TESTS:    CT Abdomen and Pelvis No Cont:      PROCEDURE DATE:  2019      INTERPRETATION:  CLINICAL STATEMENT: Abdominal pain. Leukocytosis.    FINDINGS:    LOWER CHEST: Mild bibasilar subsegmental atelectasis.    HEPATOBILIARY: Unremarkable unenhanced hepatic parenchyma.   Cholelithiasis. Dilated CBD to 1.2 cm with a 1.6 cm distal CBD   choledocholith.    SPLEEN: Unremarkable.    PANCREAS: Unremarkable.    ADRENAL GLANDS: Unremarkable.    KIDNEYS: No hydronephrosis. No renal or ureteral calculus    ABDOMINOPELVIC NODES: No enlarged abdominal or pelvic lymph nodes.    PELVIC ORGANS: Bladder wall circumferential intramural gas, compatible   with emphysematous cystitis.     PERITONEUM/MESENTERY/BOWEL: No bowel obstruction, pneumoperitoneum or   ascites. Unremarkable appendix    BONES/SOFT TISSUES: Diffuse osteopenia and degenerative changes of the   spine.    OTHER: Moderate size fat-containing periumbilical hernia. 8 mm rim   calcified splenic artery aneurysm.    IMPRESSION:    Emphysematous cystitis.   Cholelithiasis and 1.6 cm distal CBD choledocholith.      CT Cervical Spine No Cont (19 @ 21:50)    IMPRESSION:     No evidence of acute cervical spine fracture.   Severe multilevel degenerative disc disease.    CT Head No Cont (19 @ 21:49)  IMPRESSION:    No CT evidence of acute intracranial pathology.    Chronic microvascular ischemic changes and chronic lacunar infarct.    CARDIOLOGY TESTING  12 Lead ECG:   Ventricular Rate 57 BPM  Atrial Rate 57 BPM  P-R Interval 232 ms  QRS Duration 104 ms  Q-T Interval 440 ms  QTC Calculation(Bezet) 428 ms  P Axis 15 degrees  R Axis -6 degrees  T Axis -17 degrees    Diagnosis Line Sinus bradycardia with sinus arrhythmia with 1st degree A-V block  ST elevation consider lateral injury or acute infarct  ** ** ACUTE MI / STEMI ** **  Abnormal ECG    NOTIFICATION - Please notify MD and re-edit. On re-edit indicate name of  physician and date/time notified.  Confirmed by CHET BROWN MD ([id number]) on 9/10/2019 6:02:01 AM (19 @ 20:07)      All available historical records have been reviewed    MEDICATIONS  atorvastatin 10  chlorhexidine 4% Liquid 1  dextrose 5%. 1000  dextrose 50% Injectable 12.5  dextrose 50% Injectable 25  dextrose 50% Injectable 25  heparin  Infusion.   insulin glargine Injectable (LANTUS) 15  insulin lispro Injectable (HumaLOG) 5  levothyroxine 125  losartan 50  meropenem  IVPB 500  pantoprazole    Tablet 40  predniSONE   Tablet 20      ANTIBIOTICS:  meropenem  IVPB 500 milliGRAM(s) IV Intermittent every 12 hours      All available historical data has been reviewed    ASSESSMENT  83yFemale with a PMH of Type 2 DM, Anemia, CKD 3, DLD, RA, Depression brought in for weakness, decreased ambulation, frequent falls, and increased urination     IMPRESSION  #Emphysematous Cystits  #B/L LE ulceration with suspected cellulitis        RECOMMENDATIONS  - f/u pending cultures  - Continue on Meropenem 500 q12 and Vancomycin 1gm q24    This is a pended note. All final recommendations to follow pending discussion with ID Attending ANGELFREDI  83y, Female  Allergy: No Known Allergies      CHIEF COMPLAINT: Emphysematous cystitis (10 Sep 2019 03:34)      HPI:  83 year old female with PMH of DM II, anemia, CKD, DLD, RA, depression brought in by EMS from home for weakness, decreased ambulation, frequent falls and frequent urination. Patient reports that she had increased falls since her left leg is weak and she fell today twice from the chair. Patient is a poor historian. As per ED documentation, unsure if she had head trauma. Patient's neighbor arrived in ED during workup and states that patient is living in a disheveled home with a son who has developmental delay, described them as "hoarders" and since pt's   about 2 years ago, they are not taken care. Pt also complains of increased urinary frequency and constipation.     During workup in ED, code STEMI was called for EKG showing snus bradycardia with 1st degree AV block and ST elevation in I and aVL. Code STEMI was cancelled as pt was asymptomatic and hemodynamically stable.    Trauma workup in ED was negative for any fractures, however several findings were revealed during workup: CT A/P showed emphysematous cystitis, cholelithiasis and 1.6 cm distal CBD choledocholith. Duplex of lower extremities was positive for right common femoral DVT. (10 Sep 2019 03:34)    Seen and examined at bedside this morning. Patient is alert and oriented but overall is a poor historian. Endorses that she has been having issues with her feet for a few months. She reports going to a doctor and getting an antibiotic for her feet but unknown if she ever took the medication as prescribed. She was lying in bed stated that she feels funny and endorsed that she is weak when ambulating with her walker. Reported no urinary complaints. The only pain she endorsed during examination was a pain in her left shoulder which originated in her right shoulder.     FAMILY HISTORY:    PAST MEDICAL & SURGICAL HISTORY:  Depression  Chronic kidney disease (CKD)  Dyslipidemia  Hypertension  Diabetes mellitus    SOCIAL HISTORY    Substance Use ( x ) never used  (  ) IVDU (  ) Other:  Tobacco Usage:  ( x ) never smoked   (   ) former smoker   (   ) current smoker   Alcohol Usage: (   ) social  (   ) daily use ( x ) denies  Sexual History: Not sexually active    ROS  General: Denies rigors, night sweats. + weakness   HEENT: Denies headache, rhinorrhea, sore throat, eye pain  CV: Denies CP, palpitations  PULM: Denies SOB, wheezing  GI: Denies hematemesis, hematochezia, melena  : Denies discharge, hematuria. +urinary frequency  MSK: Denies arthralgias, myalgias  SKIN: + Lesion on bilateral lower extremity  NEURO: Denies paresthesias, + weakness  PSYCH: Denies depression, anxiety    VITALS:  T(F): 97.1, Max: 97.1 (09-10-19 @ 03:18)  HR: 64  BP: 161/69  RR: 18Vital Signs Last 24 Hrs  T(C): 36.2 (10 Sep 2019 03:18), Max: 36.2 (10 Sep 2019 03:18)  T(F): 97.1 (10 Sep 2019 03:18), Max: 97.1 (10 Sep 2019 03:18)  HR: 64 (10 Sep 2019 06:41) (64 - 78)  BP: 161/69 (10 Sep 2019 06:41) (138/76 - 161/69)  BP(mean): --  RR: 18 (10 Sep 2019 06:41) (18 - 18)  SpO2: 98% (10 Sep 2019 06:41) (96% - 98%)    PHYSICAL EXAM:  Gen: NAD, resting in bed. Elderly and slightly disheveled appearing   HEENT: Normocephalic, atraumatic  Neck: supple, no lymphadenopathy  CV: Regular rate & regular rhythm  Lungs: Clear to ausculation, No wheezing, rhonchi, rales  Abdomen: Soft, Nontender, nondistended, BS present  Ext: Warm, well perfused  Neuro: non focal, awake  Skin: +Bilateral LE areas of blistering on bilateral shins. RLE with noted erythema from dorsum of foot to just below the knee. Erythema on hallux and darkening of second toe. LLE with noted scabbed over blisters and blackened lifted off nail of second toe.     TESTS & MEASUREMENTS:                        13.6   19.92 )-----------( 370      ( 09 Sep 2019 20:05 )             41.7         133<L>  |  100  |  90<HH>  ----------------------------<  420<H>  6.0<HH>   |  18  |  2.4<H>    Ca    9.7      09 Sep 2019 20:05  Mg     2.2         TPro  5.4<L>  /  Alb  2.5<L>  /  TBili  0.6  /  DBili  x   /  AST  39  /  ALT  53<H>  /  AlkPhos  181<H>      eGFR if Non African American: 18 mL/min/1.73M2 (19 @ 20:05)  eGFR if African American: 21 mL/min/1.73M2 (19 @ 20:05)    LIVER FUNCTIONS - ( 09 Sep 2019 20:05 )  Alb: 2.5 g/dL / Pro: 5.4 g/dL / ALK PHOS: 181 U/L / ALT: 53 U/L / AST: 39 U/L / GGT: x           Urinalysis Basic - ( 10 Sep 2019 00:10 )    Color: Yellow / Appearance: Slightly Turbid / S.020 / pH: x  Gluc: x / Ketone: Trace  / Bili: Negative / Urobili: <2 mg/dL   Blood: x / Protein: 300 mg/dL / Nitrite: Positive   Leuk Esterase: Negative / RBC: 30 /HPF / WBC 3 /HPF   Sq Epi: x / Non Sq Epi: 0 /HPF / Bacteria: Many      Blood Gas Venous - Lactate: 1.3 mmoL/L (09-10-19 @ 01:46)  Blood Gas Venous - Lactate: 2.5 mmoL/L (09-10-19 @ 00:18)  Lactate, Blood: 1.7 mmol/L (19 @ 20:05)      INFECTIOUS DISEASES TESTING      RADIOLOGY & ADDITIONAL TESTS:    CT Abdomen and Pelvis No Cont:      PROCEDURE DATE:  2019      INTERPRETATION:  CLINICAL STATEMENT: Abdominal pain. Leukocytosis.    FINDINGS:    LOWER CHEST: Mild bibasilar subsegmental atelectasis.    HEPATOBILIARY: Unremarkable unenhanced hepatic parenchyma.   Cholelithiasis. Dilated CBD to 1.2 cm with a 1.6 cm distal CBD   choledocholith.    SPLEEN: Unremarkable.    PANCREAS: Unremarkable.    ADRENAL GLANDS: Unremarkable.    KIDNEYS: No hydronephrosis. No renal or ureteral calculus    ABDOMINOPELVIC NODES: No enlarged abdominal or pelvic lymph nodes.    PELVIC ORGANS: Bladder wall circumferential intramural gas, compatible   with emphysematous cystitis.     PERITONEUM/MESENTERY/BOWEL: No bowel obstruction, pneumoperitoneum or   ascites. Unremarkable appendix    BONES/SOFT TISSUES: Diffuse osteopenia and degenerative changes of the   spine.    OTHER: Moderate size fat-containing periumbilical hernia. 8 mm rim   calcified splenic artery aneurysm.    IMPRESSION:    Emphysematous cystitis.   Cholelithiasis and 1.6 cm distal CBD choledocholith.      CT Cervical Spine No Cont (19 @ 21:50)    IMPRESSION:     No evidence of acute cervical spine fracture.   Severe multilevel degenerative disc disease.    CT Head No Cont (19 @ 21:49)  IMPRESSION:    No CT evidence of acute intracranial pathology.    Chronic microvascular ischemic changes and chronic lacunar infarct.    CARDIOLOGY TESTING  12 Lead ECG:   Ventricular Rate 57 BPM  Atrial Rate 57 BPM  P-R Interval 232 ms  QRS Duration 104 ms  Q-T Interval 440 ms  QTC Calculation(Bezet) 428 ms  P Axis 15 degrees  R Axis -6 degrees  T Axis -17 degrees    Diagnosis Line Sinus bradycardia with sinus arrhythmia with 1st degree A-V block  ST elevation consider lateral injury or acute infarct  ** ** ACUTE MI / STEMI ** **  Abnormal ECG    NOTIFICATION - Please notify MD and re-edit. On re-edit indicate name of  physician and date/time notified.  Confirmed by CHET BROWN MD ([id number]) on 9/10/2019 6:02:01 AM (19 @ 20:07)      All available historical records have been reviewed    MEDICATIONS  atorvastatin 10  chlorhexidine 4% Liquid 1  dextrose 5%. 1000  dextrose 50% Injectable 12.5  dextrose 50% Injectable 25  dextrose 50% Injectable 25  heparin  Infusion.   insulin glargine Injectable (LANTUS) 15  insulin lispro Injectable (HumaLOG) 5  levothyroxine 125  losartan 50  meropenem  IVPB 500  pantoprazole    Tablet 40  predniSONE   Tablet 20      ANTIBIOTICS:  meropenem  IVPB 500 milliGRAM(s) IV Intermittent every 12 hours      All available historical data has been reviewed    ASSESSMENT  83yFemale with a PMH of Type 2 DM, Anemia, CKD 3, DLD, RA, Depression brought in for weakness, decreased ambulation, frequent falls, and increased urination     IMPRESSION  #Cellulitis of LLE with no signs of OM, fascitis, or abscess  #Emphysematous Cystitis unlikely given no pyuria, dysuria. Noted hematuria likely more suspicious for malignant process  #Cholelithiasis: No evidence of Cholestasis in setting of dilated CBD   #Subungual hematoma of toe      RECOMMENDATIONS  - f/u pending cultures  - Switch Antibiotics to Unasyn 3gm q12  - GI consult to evaluate for need for MRCP vs ERCP  - Silvadene to bilateral LE BID ANGELFREDI  83y, Female  Allergy: No Known Allergies      CHIEF COMPLAINT: Emphysematous cystitis (10 Sep 2019 03:34)      HPI:  83 year old female with PMH of DM II, anemia, CKD, DLD, RA, depression brought in by EMS from home for weakness, decreased ambulation, frequent falls and frequent urination. Patient reports that she had increased falls since her left leg is weak and she fell today twice from the chair. Patient is a poor historian. As per ED documentation, unsure if she had head trauma. Patient's neighbor arrived in ED during workup and states that patient is living in a disheveled home with a son who has developmental delay, described them as "hoarders" and since pt's   about 2 years ago, they are not taken care. Pt also complains of increased urinary frequency and constipation.     During workup in ED, code STEMI was called for EKG showing snus bradycardia with 1st degree AV block and ST elevation in I and aVL. Code STEMI was cancelled as pt was asymptomatic and hemodynamically stable.    Trauma workup in ED was negative for any fractures, however several findings were revealed during workup: CT A/P showed emphysematous cystitis, cholelithiasis and 1.6 cm distal CBD choledocholith. Duplex of lower extremities was positive for right common femoral DVT. (10 Sep 2019 03:34)    Seen and examined at bedside this morning. Patient is alert and oriented but overall is a poor historian. Endorses that she has been having issues with her feet for a few months. She reports going to a doctor and getting an antibiotic for her feet but unknown if she ever took the medication as prescribed. She was lying in bed stated that she feels funny and endorsed that she is weak when ambulating with her walker. Reported no urinary complaints. The only pain she endorsed during examination was a pain in her left shoulder which originated in her right shoulder.     FAMILY HISTORY:    PAST MEDICAL & SURGICAL HISTORY:  Depression  Chronic kidney disease (CKD)  Dyslipidemia  Hypertension  Diabetes mellitus    SOCIAL HISTORY    Substance Use ( x ) never used  (  ) IVDU (  ) Other:  Tobacco Usage:  ( x ) never smoked   (   ) former smoker   (   ) current smoker   Alcohol Usage: (   ) social  (   ) daily use ( x ) denies  Sexual History: Not sexually active    ROS  General: Denies rigors, night sweats. + weakness   HEENT: Denies headache, rhinorrhea, sore throat, eye pain  CV: Denies CP, palpitations  PULM: Denies SOB, wheezing  GI: Denies hematemesis, hematochezia, melena  : Denies discharge, hematuria. +urinary frequency  MSK: Denies arthralgias, myalgias  SKIN: + Lesion on bilateral lower extremity  NEURO: Denies paresthesias, + weakness  PSYCH: Denies depression, anxiety    VITALS:  T(F): 97.1, Max: 97.1 (09-10-19 @ 03:18)  HR: 64  BP: 161/69  RR: 18Vital Signs Last 24 Hrs  T(C): 36.2 (10 Sep 2019 03:18), Max: 36.2 (10 Sep 2019 03:18)  T(F): 97.1 (10 Sep 2019 03:18), Max: 97.1 (10 Sep 2019 03:18)  HR: 64 (10 Sep 2019 06:41) (64 - 78)  BP: 161/69 (10 Sep 2019 06:41) (138/76 - 161/69)  BP(mean): --  RR: 18 (10 Sep 2019 06:41) (18 - 18)  SpO2: 98% (10 Sep 2019 06:41) (96% - 98%)    PHYSICAL EXAM:  Gen: NAD, resting in bed. Elderly and slightly disheveled appearing   HEENT: Normocephalic, atraumatic  Neck: supple, no lymphadenopathy  CV: Regular rate & regular rhythm  Lungs: Clear to ausculation, No wheezing, rhonchi, rales  Abdomen: Soft, Nontender, nondistended, BS present  Ext: Warm, well perfused  Neuro: non focal, awake  Skin: +Bilateral LE areas of blistering on bilateral shins. RLE with noted erythema from dorsum of foot to just below the knee. Erythema on hallux and darkening of second toe. LLE with noted scabbed over blisters and blackened lifted off nail of second toe.     TESTS & MEASUREMENTS:                        13.6   19.92 )-----------( 370      ( 09 Sep 2019 20:05 )             41.7         133<L>  |  100  |  90<HH>  ----------------------------<  420<H>  6.0<HH>   |  18  |  2.4<H>    Ca    9.7      09 Sep 2019 20:05  Mg     2.2         TPro  5.4<L>  /  Alb  2.5<L>  /  TBili  0.6  /  DBili  x   /  AST  39  /  ALT  53<H>  /  AlkPhos  181<H>      eGFR if Non African American: 18 mL/min/1.73M2 (19 @ 20:05)  eGFR if African American: 21 mL/min/1.73M2 (19 @ 20:05)    LIVER FUNCTIONS - ( 09 Sep 2019 20:05 )  Alb: 2.5 g/dL / Pro: 5.4 g/dL / ALK PHOS: 181 U/L / ALT: 53 U/L / AST: 39 U/L / GGT: x           Urinalysis Basic - ( 10 Sep 2019 00:10 )    Color: Yellow / Appearance: Slightly Turbid / S.020 / pH: x  Gluc: x / Ketone: Trace  / Bili: Negative / Urobili: <2 mg/dL   Blood: x / Protein: 300 mg/dL / Nitrite: Positive   Leuk Esterase: Negative / RBC: 30 /HPF / WBC 3 /HPF   Sq Epi: x / Non Sq Epi: 0 /HPF / Bacteria: Many      Blood Gas Venous - Lactate: 1.3 mmoL/L (09-10-19 @ 01:46)  Blood Gas Venous - Lactate: 2.5 mmoL/L (09-10-19 @ 00:18)  Lactate, Blood: 1.7 mmol/L (19 @ 20:05)      INFECTIOUS DISEASES TESTING      RADIOLOGY & ADDITIONAL TESTS:    CT Abdomen and Pelvis No Cont:      PROCEDURE DATE:  2019      INTERPRETATION:  CLINICAL STATEMENT: Abdominal pain. Leukocytosis.    FINDINGS:    LOWER CHEST: Mild bibasilar subsegmental atelectasis.    HEPATOBILIARY: Unremarkable unenhanced hepatic parenchyma.   Cholelithiasis. Dilated CBD to 1.2 cm with a 1.6 cm distal CBD   choledocholith.    SPLEEN: Unremarkable.    PANCREAS: Unremarkable.    ADRENAL GLANDS: Unremarkable.    KIDNEYS: No hydronephrosis. No renal or ureteral calculus    ABDOMINOPELVIC NODES: No enlarged abdominal or pelvic lymph nodes.    PELVIC ORGANS: Bladder wall circumferential intramural gas, compatible   with emphysematous cystitis.     PERITONEUM/MESENTERY/BOWEL: No bowel obstruction, pneumoperitoneum or   ascites. Unremarkable appendix    BONES/SOFT TISSUES: Diffuse osteopenia and degenerative changes of the   spine.    OTHER: Moderate size fat-containing periumbilical hernia. 8 mm rim   calcified splenic artery aneurysm.    IMPRESSION:    Emphysematous cystitis.   Cholelithiasis and 1.6 cm distal CBD choledocholith.      CT Cervical Spine No Cont (19 @ 21:50)    IMPRESSION:     No evidence of acute cervical spine fracture.   Severe multilevel degenerative disc disease.    CT Head No Cont (19 @ 21:49)  IMPRESSION:    No CT evidence of acute intracranial pathology.    Chronic microvascular ischemic changes and chronic lacunar infarct.    CARDIOLOGY TESTING  12 Lead ECG:   Ventricular Rate 57 BPM  Atrial Rate 57 BPM  P-R Interval 232 ms  QRS Duration 104 ms  Q-T Interval 440 ms  QTC Calculation(Bezet) 428 ms  P Axis 15 degrees  R Axis -6 degrees  T Axis -17 degrees    Diagnosis Line Sinus bradycardia with sinus arrhythmia with 1st degree A-V block  ST elevation consider lateral injury or acute infarct  ** ** ACUTE MI / STEMI ** **  Abnormal ECG    NOTIFICATION - Please notify MD and re-edit. On re-edit indicate name of  physician and date/time notified.  Confirmed by CHET BROWN MD ([id number]) on 9/10/2019 6:02:01 AM (19 @ 20:07)      All available historical records have been reviewed    MEDICATIONS  atorvastatin 10  chlorhexidine 4% Liquid 1  dextrose 5%. 1000  dextrose 50% Injectable 12.5  dextrose 50% Injectable 25  dextrose 50% Injectable 25  heparin  Infusion.   insulin glargine Injectable (LANTUS) 15  insulin lispro Injectable (HumaLOG) 5  levothyroxine 125  losartan 50  meropenem  IVPB 500  pantoprazole    Tablet 40  predniSONE   Tablet 20      ANTIBIOTICS:  meropenem  IVPB 500 milliGRAM(s) IV Intermittent every 12 hours      All available historical data has been reviewed    ASSESSMENT  83yFemale with a PMH of Type 2 DM, Anemia, CKD 3, DLD, RA, Depression brought in for weakness, decreased ambulation, frequent falls, and increased urination     IMPRESSION  Cellulitis of LLE : with no signs of OM, fascitis, or abscess  Emphysematous Cystitis : unlikely given no pyuria, dysuria and clinically no evidence of cystitis.  Hematuria likely more suspicious for malignant process  Cholelithiasis: No evidence of Cholestasis in setting of dilated CBD . no acute cholecystitis/ cholangitis.  CBD significantly dilated        RECOMMENDATIONS  BCX  UCx  Unasyn 3gm iv q12  GI consult to evaluate for need for MRCP for further work up of dilated CBD  Silverdene to bilateral LE BID

## 2019-09-10 NOTE — H&P ADULT - HISTORY OF PRESENT ILLNESS
83 year old female with PMH of DM II, anemia, CKD, DLD, RA, depression brought in by EMS from home for weakness, decreased ambulation, frequent falls and frequent urination. Patient reports that she had increased falls since her left leg is weak and she fell today twice from the chair. As per ED documentation, unsure if she had head trauma. Patient's neighbor arrived in ED during workup and states that patient is living in a disheveled home with a son who has developmental delay, described them as "hoarders" and since pt's   about 2 years ago, they are not taken care. Pt also complains of increased urinary frequency and constipation.     During workup in ED, code STEMI was called for EKG showing snus bradycardia with 1st degree AV block and ST elevation in I and aVL. Code STEMI was cancelled as pt was asymptomatic and hemodynamically stable.    Trauma workup in ED was negative for any fractures, however several findings were revealed during workup: CT A/P showed emphysematous cystitis, cholelithiasis and 1.6 cm distal CBD choledocholith. Duplex of lower extremities was positive for right common femoral DVT. 83 year old female with PMH of DM II, anemia, CKD, DLD, RA, depression brought in by EMS from home for weakness, decreased ambulation, frequent falls and frequent urination. Patient reports that she had increased falls since her left leg is weak and she fell today twice from the chair. Patient is a poor historian. As per ED documentation, unsure if she had head trauma. Patient's neighbor arrived in ED during workup and states that patient is living in a disheveled home with a son who has developmental delay, described them as "hoarders" and since pt's   about 2 years ago, they are not taken care. Pt also complains of increased urinary frequency and constipation.     During workup in ED, code STEMI was called for EKG showing snus bradycardia with 1st degree AV block and ST elevation in I and aVL. Code STEMI was cancelled as pt was asymptomatic and hemodynamically stable.    Trauma workup in ED was negative for any fractures, however several findings were revealed during workup: CT A/P showed emphysematous cystitis, cholelithiasis and 1.6 cm distal CBD choledocholith. Duplex of lower extremities was positive for right common femoral DVT.

## 2019-09-10 NOTE — H&P ADULT - NSHPPHYSICALEXAM_GEN_ALL_CORE
ICU Vital Signs Last 24 Hrs  T(C): 36.2 (10 Sep 2019 03:18), Max: 36.2 (10 Sep 2019 03:18)  T(F): 97.1 (10 Sep 2019 03:18), Max: 97.1 (10 Sep 2019 03:18)  HR: 71 (10 Sep 2019 03:18) (70 - 78)  BP: 138/76 (10 Sep 2019 03:18) (138/76 - 146/80)  RR: 18 (10 Sep 2019 03:18) (18 - 18)  SpO2: 96% (10 Sep 2019 03:18) (96% - 96%)    ----------    PHYSICAL EXAM:  GENERAL: NAD, speaks in full sentences, no signs of respiratory distress  HEAD:  Atraumatic, Normocephalic  EYES: EOMI, PERRLA  NECK: Supple, No JVD  CHEST/LUNG: Clear to auscultation bilaterally; No wheeze; No crackles; No accessory muscles used  HEART: Regular rate and rhythm; No murmurs;   ABDOMEN: Soft, Nontender, Nondistended; Bowel sounds present; No guarding  EXTREMITIES: 3+ bilateral LE pitting edema. Bilateral shins with ulcerations and surrounding erythema  PSYCH: AAOx3  NEUROLOGY: non-focal  SKIN: as above

## 2019-09-10 NOTE — PATIENT PROFILE ADULT - NSPROSPHOSPCHAPLAINYN_GEN_A_NUR
February 21, 2019      [facility]  [address] [place] [address]  Phone: [phone]  Fax: [phone]       Patient: Elida Pickard   YOB: 1997  Date of Visit: 02/21/2019    To Whom It May Concern:    Jani Pickard  was at [facility] on 02/21/2019. She may return to work/school on 02/25/2019 with no restrictions. If you have any questions or concerns, or if I can be of further assistance, please do not hesitate to contact me.    Sincerely,    Kerry Cardenas, NP      no

## 2019-09-10 NOTE — CONSULT NOTE ADULT - ASSESSMENT
84y/o F with emphysematous cystitis    - Case discussed with Dr. Farley  - Please insert and continue Morris catheter   - Broad spectrum antibiotics  - No acute urologic intervention at this time. 82y/o F with emphysematous cystitis    - Case discussed with Dr. Farley  - Please insert and continue Morris catheter   - Broad spectrum antibiotics  - No acute urologic intervention at this time.  -I was in house so at the request of Dr. Farley I saw the patient and reviewed the CT. The note was reviewed and the patient has emphysematous cystitis for which the treatment is for spectrum antibiotics until cultures are available. If she begins to deteriorate consult us but that usually is sufficient.

## 2019-09-11 NOTE — PROGRESS NOTE ADULT - ASSESSMENT
ASSESSMENT  83yFemale with a PMH of Type 2 DM, Anemia, CKD 3, DLD, RA, Depression brought in for weakness, decreased ambulation, frequent falls, and increased urination     IMPRESSION  Cellulitis of LLE : with no signs of OM, fascitis, or abscess  Emphysematous Cystitis : unlikely given no pyuria, dysuria and clinically no evidence of cystitis.  Hematuria likely more suspicious for malignant process  Cholelithiasis: No evidence of Cholestasis in setting of dilated CBD . no acute cholecystitis/ cholangitis.  CBD significantly dilated  BCx 9/9 NG  UCx 9/10 GNRs        RECOMMENDATIONS  F/u UCx  D/c Unasyn   Rocephin 2 gm iv q24h  GI consult to evaluate for need for MRCP for further work up of dilated CBD  Silverdene to bilateral LE BID

## 2019-09-11 NOTE — PROGRESS NOTE ADULT - SUBJECTIVE AND OBJECTIVE BOX
ANGELFREDI  83y, Female      OVERNIGHT EVENTS:    no fevers, no abdominal pain/ diarrhea  no  complaints  discomfort RLE    VITALS:  T(F): 96.4, Max: 97.4 (19 @ 05:29)  HR: 56  BP: 166/65  RR: 18Vital Signs Last 24 Hrs  T(C): 35.8 (11 Sep 2019 08:14), Max: 36.3 (11 Sep 2019 05:29)  T(F): 96.4 (11 Sep 2019 08:14), Max: 97.4 (11 Sep 2019 05:29)  HR: 56 (11 Sep 2019 08:44) (55 - 66)  BP: 166/65 (11 Sep 2019 08:44) (120/91 - 176/84)  BP(mean): --  RR: 18 (11 Sep 2019 08:14) (18 - 18)  SpO2: 99% (11 Sep 2019 08:14) (98% - 99%)    TESTS & MEASUREMENTS:                        12.5   14.16 )-----------( 264      ( 11 Sep 2019 07:30 )             38.8         136  |  106  |  75<HH>  ----------------------------<  142<H>  5.5<H>   |  15<L>  |  2.0<H>    Ca    8.9      11 Sep 2019 07:30  Mg     2.2         TPro  4.4<L>  /  Alb  1.9<L>  /  TBili  0.4  /  DBili  x   /  AST  30  /  ALT  45<H>  /  AlkPhos  219<H>      LIVER FUNCTIONS - ( 11 Sep 2019 07:30 )  Alb: 1.9 g/dL / Pro: 4.4 g/dL / ALK PHOS: 219 U/L / ALT: 45 U/L / AST: 30 U/L / GGT: x             Culture - Other (collected 09-10-19 @ 04:26)  Source: .Other Lower extremity drainage  Preliminary Report (19 @ 09:45):    Numerous Gram Negative Rods    Numerous Streptococcus agalactiae (Group B) isolated    Group B streptococci are susceptible to ampicillin,    penicillin and cefazolin, but may be resistant to    erythromycin and clindamycin.    Recommendations for intrapartum prophylaxis for Group B    streptococci are penicillin or ampicillin.    Culture - Urine (collected 09-10-19 @ 00:10)  Source: .Urine Clean Catch (Midstream)  Preliminary Report (19 @ 09:41):    >100,000 CFU/ml Gram Negative Rods    Culture - Blood (collected 19 @ 20:25)  Source: .Blood Blood  Preliminary Report (19 @ 03:01):    No growth to date.    Culture - Blood (collected 19 @ 20:05)  Source: .Blood Blood  Preliminary Report (19 @ 03:01):    No growth to date.      Urinalysis Basic - ( 10 Sep 2019 00:10 )    Color: Yellow / Appearance: Slightly Turbid / S.020 / pH: x  Gluc: x / Ketone: Trace  / Bili: Negative / Urobili: <2 mg/dL   Blood: x / Protein: 300 mg/dL / Nitrite: Positive   Leuk Esterase: Negative / RBC: 30 /HPF / WBC 3 /HPF   Sq Epi: x / Non Sq Epi: 0 /HPF / Bacteria: Many          RADIOLOGY & ADDITIONAL TESTS:    ANTIBIOTICS:  ampicillin/sulbactam  IVPB 3 Gram(s) IV Intermittent every 12 hours  ampicillin/sulbactam  IVPB

## 2019-09-11 NOTE — PROGRESS NOTE ADULT - SUBJECTIVE AND OBJECTIVE BOX
SUBJECTIVE:    Patient is a 83y old Female who presents with a chief complaint of Emphysematous cystitis (11 Sep 2019 09:52)    Currently admitted to medicine with the primary diagnosis of Emphysematous cystitis     Today is hospital day 1d. This morning she is resting comfortably in bed and reports no new issues or overnight events.  Pt seen at bedside, feels fine. Poor historian. watson in place- has bloody urine. Endorses pain in legs.      PAST MEDICAL & SURGICAL HISTORY  Depression  Chronic kidney disease (CKD)  Dyslipidemia  Hypertension  Diabetes mellitus    SOCIAL HISTORY:  Negative for smoking/alcohol/drug use.     ALLERGIES:  No Known Allergies    MEDICATIONS:  STANDING MEDICATIONS  atorvastatin 10 milliGRAM(s) Oral at bedtime  cefTRIAXone   IVPB 2000 milliGRAM(s) IV Intermittent every 24 hours  chlorhexidine 4% Liquid 1 Application(s) Topical <User Schedule>  dextrose 5%. 1000 milliLiter(s) IV Continuous <Continuous>  dextrose 50% Injectable 12.5 Gram(s) IV Push once  dextrose 50% Injectable 25 Gram(s) IV Push once  dextrose 50% Injectable 25 Gram(s) IV Push once  heparin  Infusion 11 Unit(s)/Hr IV Continuous <Continuous>  influenza   Vaccine 0.5 milliLiter(s) IntraMuscular once  insulin glargine Injectable (LANTUS) 15 Unit(s) SubCutaneous every morning  insulin lispro Injectable (HumaLOG) 5 Unit(s) SubCutaneous three times a day before meals  levothyroxine 125 MICROGram(s) Oral daily  losartan 50 milliGRAM(s) Oral daily  pantoprazole    Tablet 40 milliGRAM(s) Oral before breakfast  predniSONE   Tablet 20 milliGRAM(s) Oral daily  silver sulfADIAZINE 1% Cream 1 Application(s) Topical every 12 hours  sodium chloride 0.9%. 1000 milliLiter(s) IV Continuous <Continuous>    PRN MEDICATIONS  dextrose 40% Gel 15 Gram(s) Oral once PRN  glucagon  Injectable 1 milliGRAM(s) IntraMuscular once PRN    VITALS:   T(F): 96.4  HR: 56  BP: 166/65  RR: 18  SpO2: 99%    LABS:                        12.5   14.16 )-----------( 264      ( 11 Sep 2019 07:30 )             38.8     09-11    136  |  106  |  75<HH>  ----------------------------<  142<H>  5.5<H>   |  15<L>  |  2.0<H>    Ca    8.9      11 Sep 2019 07:30  Mg     2.2         TPro  4.4<L>  /  Alb  1.9<L>  /  TBili  0.4  /  DBili  x   /  AST  30  /  ALT  45<H>  /  AlkPhos  219<H>      PT/INR - ( 09 Sep 2019 20:50 )   PT: 9.80 sec;   INR: 0.85 ratio         PTT - ( 10 Sep 2019 23:30 )  PTT:81.3 sec  Urinalysis Basic - ( 10 Sep 2019 00:10 )    Color: Yellow / Appearance: Slightly Turbid / S.020 / pH: x  Gluc: x / Ketone: Trace  / Bili: Negative / Urobili: <2 mg/dL   Blood: x / Protein: 300 mg/dL / Nitrite: Positive   Leuk Esterase: Negative / RBC: 30 /HPF / WBC 3 /HPF   Sq Epi: x / Non Sq Epi: 0 /HPF / Bacteria: Many            Culture - Other (collected 10 Sep 2019 04:26)  Source: .Other Lower extremity drainage  Preliminary Report (11 Sep 2019 09:45):    Numerous Gram Negative Rods    Numerous Streptococcus agalactiae (Group B) isolated    Group B streptococci are susceptible to ampicillin,    penicillin and cefazolin, but may be resistant to    erythromycin and clindamycin.    Recommendations for intrapartum prophylaxis for Group B    streptococci are penicillin or ampicillin.    Culture - Urine (collected 10 Sep 2019 00:10)  Source: .Urine Clean Catch (Midstream)  Preliminary Report (11 Sep 2019 09:41):    >100,000 CFU/ml Gram Negative Rods    Culture - Blood (collected 09 Sep 2019 20:25)  Source: .Blood Blood  Preliminary Report (11 Sep 2019 03:01):    No growth to date.    Culture - Blood (collected 09 Sep 2019 20:05)  Source: .Blood Blood  Preliminary Report (11 Sep 2019 03:01):    No growth to date.      CARDIAC MARKERS ( 09 Sep 2019 20:50 )  x     / 0.03 ng/mL / x     / x     / x            RADIOLOGY:  < from: [facility] Duplex Lower Extrem Arterial, Bilat (09.10.19 @ 10:29) >  Impression:    No evidence of significant arterial occlusive disease in bilateral lower   extremities.    < end of copied text >    < from: US Abdomen Limited (09.10.19 @ 01:54) >  IMPRESSION:    Limited evaluation of the gallbladder fundus secondary to limited patient   positioning. Cholelithiasis with questionable borderline wall thickening,   partially visualized. No appreciable pericholecystic fluid and negative   reported sonographic Reza's sign. No definitive sonographic criteria   for acute cholecystitis. Clinical correlation can be made.    Biliary ductal dilatation presumably secondary to patient's known   choledocholith.    < end of copied text >    < from: [facility] Duplex Lower Ext Vein Scan, Bilat (19 @ 22:01) >  Impression:    Deep venous thrombosis right common femoral vein.    No evidence of deep venous thrombosis or superficial thrombophlebitis in   left lower extremity    < end of copied text >      PHYSICAL EXAM:  GEN: No acute distress  LUNGS: Clear to auscultation bilaterally   HEART: S1/S2 present. RRR.   ABD: Soft, non-tender, non-distended. Bowel sounds present,. watson draining bloody urine.   EXT: Dressing present, tender to touch  NEURO: AAOX3

## 2019-09-11 NOTE — CONSULT NOTE ADULT - ASSESSMENT
bilateral lower leg wounds ? etiology--> cx sent    rec: soap and water qd, silvadene cream , ABD pad, kerlex and ace wrap bid    may need further operative debridement

## 2019-09-11 NOTE — PROGRESS NOTE ADULT - ATTENDING COMMENTS
Patient seen and examined independently. I agree with the resident's note, physical exam, and plan except as below.  Vital Signs Last 24 Hrs  T(C): 35.8 (11 Sep 2019 08:14), Max: 36.3 (11 Sep 2019 05:29)  T(F): 96.4 (11 Sep 2019 08:14), Max: 97.4 (11 Sep 2019 05:29)  HR: 56 (11 Sep 2019 08:44) (55 - 66)  BP: 166/65 (11 Sep 2019 08:44) (120/91 - 176/84)  BP(mean): --  RR: 18 (11 Sep 2019 08:14) (18 - 18)  SpO2: 99% (11 Sep 2019 08:14) (98% - 99%)  Pe  nad  elderly  [place]  [id number]  ctbal  soft ntnd+bs  watson with blood tinged urine  bilateral lower extremity edema with erythema and ulceration , tender  multipe ecchymosis , right 2nd toe discoloration     #frequent falls - debility - pt/rehab - needs SNF  #right shoulder dislocation - pt unable raise arm - ortho eval for possible reduction   < from: Xray Shoulder 2 Views, Right (09.09.19 @ 21:50) >    Findings consistent with acromioclavicular joint separation of   indeterminate age with widening of the AC joint interval measuring 1 cm.   Correlate clinically for acute symptoms.      < end of copied text >    #uti/cystitis/Le cellulitis - on rocephin - check urine culture, blood culture  #hematuria - starting to clear - pt on Iv heparin - monitor h/h. bladder sono, uro eval  #Right CF dvt - started on iv heparin - monitor PTT - 50-80 - if hematurai worsens may need to call vascular for filter  #BALDEV on CKD III - likely prerenal - IVfs - monitor renal function   #CBD dilation - check MRCP   #DMII - insulin coverage - increase scale if remains elevated   CAPILLARY BLOOD GLUCOSE      POCT Blood Glucose.: 346 mg/dL (11 Sep 2019 11:28)  POCT Blood Glucose.: 148 mg/dL (11 Sep 2019 08:34)  POCT Blood Glucose.: 203 mg/dL (10 Sep 2019 18:58)  POCT Blood Glucose.: 190 mg/dL (10 Sep 2019 17:20)    #hxof RA on prednisone

## 2019-09-11 NOTE — PROGRESS NOTE ADULT - ASSESSMENT
83 year old female with PMH of DM II, anemia, CKD, DLD, RA, depression brought in by EMS from home for weakness, decreased ambulation, frequent falls and frequent urination. Admitted with multiple comorbidities.    # UTI + UA with hematuria  - Sepsis ruled out on admission, WBC 20  - Frequent urination, UA positive, BALDEV  - CT A/P showed emphysematous cystitis  - s/p Ancef, Zosyn and vancomycin in ED  - Urology eval: no intervention  - seen By ID- doubt     # Abnormal EKG with elevated troponin  - s/p code STEMI in ED - cancelled  - Pt asymptomatic, unknown baseline EKG  - Possibly due to BALDEV on CKD  - Serial troponin and EKG; check CPK and CKMB  - Check 2D-echo    # Right common femoral DVT  - Vascular consult appreciated  - Continue Heparin infusion for now  - Monitor PTT  - Arterial duplex ordered, f/u    # BALDEV on CKD 3  - Cr 12/28/2018 was 1.3, now 2.4  - Possibly prerenal - s/p 1 liter IV bolus  - Monitor Cr  - Avoid nephrotoxic agents  - If worsens or not improving, consider nephrology eval    # Common bile duct choledocholith  - Denies pain, and unremarkable physical exam  - Mild transaminitis  - Surgical eval: no surgical intervention  - Trend LFTs, if worsens or not improving, consider GI eval    # Bilateral shins ulceration with erythema and discharge  - Left shin purulence culture sent - follow up  - s/p Ancef, Vanco and Zosyn in ED  - On Meropenem  - Infectious disease eval  - Burn eval    # Weakness and frequent falls  - Likely due to multiple comorbidities  - Trauma workup in ED negative for any fractures  - Consult PT/rehab when pt's acuity improves    # DM II - elevated  - Glucose 420 on arrival - likely component of noncompliance and on prednisone  - Check HbA1C  - Start basal and bolus insulin regimen  - Monitor Fingersticks  - Carb consistent diet    # Rheumatoid arthritis  - On Prednisone    # DLD - Continue statin    # Elder self-neglect  - Unsafe living condition  - Social work assessment  - Needs placement    GI ppx: Protonix  DVT ppx/tx: on Heparin infusion    Dispo: from home, needs placement 83 year old female with PMH of DM II, anemia, CKD, DLD, RA, depression brought in by EMS from home for weakness, decreased ambulation, frequent falls and frequent urination. Admitted with multiple comorbidities.    # UTI , + UA with hematuria  - Sepsis ruled out on admission, WBC 20  - Frequent urination, UA positive, BALDEV  - CT A/P showed emphysematous cystitis  - s/p Ancef, Zosyn and vancomycin in ED  - Urology eval: no intervention, care discussed with urology- for new hematuria- watson irrigation, c/w heparin, hematuria could be 2/2 UTI and heparin.   - seen By ID- low suspicion for emphysematous cystitis, abx rocephin for now, as per ID hematuria could be due to underlying malignancy   - f/u urine cx    # Right common femoral DVT on heparin gtt  - Vascular consult appreciated  - Continue Heparin infusion for now, f/u PTT at 4Pm.   - Arterial duplex negative.     # BALDEV on CKD 3- improving.   - Cr 12/28/2018 was 1.3.   - Possibly prerenal - s/p 1 liter IV bolus  - Monitor Cr  - Avoid nephrotoxic agents  - c/w NS at 50 cc/hr.     # Common bile duct choledocholith  - Denies pain, and unremarkable physical exam  - Mild transaminitis  - Surgical eval: no surgical intervention  - US abdomen- +ve cholelithiasis, no cholecystitis.   - MRCP for CBD dilation.   - GI eval based on MRCP    # Bilateral shins ulceration with erythema and discharge  - Left shin purulence culture noted.   - s/p Ancef, Vanco and Zosyn in ED  - ID on board- silverdene topical ointment to legs.   - Burn eval    # Weakness and frequent falls  - Likely due to multiple comorbidities  - Trauma workup in ED negative for any fractures  - Consult PT/rehab when pt's acuity improves    # DM II - elevated  - Glucose 420 on arrival - likely component of noncompliance and on prednisone  - Check HbA1C  - Start basal and bolus insulin regimen  - Monitor Fingersticks  - Carb consistent diet    # Rheumatoid arthritis  - On Prednisone    # DLD - Continue statin    # Elder self-neglect  - poor nutritional status- hypo-albunemia- RD eval.   - Unsafe living condition  - Social work assessment  - Needs placement    GI ppx: Protonix  DVT ppx/tx: on Heparin infusion    Dispo: from home, needs placement

## 2019-09-11 NOTE — CHART NOTE - NSCHARTNOTEFT_GEN_A_CORE
Most recent PTT was 44.1.   Readjusted Heparin drip from 8mL/hr to 9ml/hr to achieve goal of 50-80.  Will follow up with PTT and HgB in the AM.   If patient has any further episodes of hematuria, will contact vascular for possible IVC filter placement

## 2019-09-11 NOTE — CONSULT NOTE ADULT - SUBJECTIVE AND OBJECTIVE BOX
pt with bilateral lower leg wounds ? etiology    PE: bilateal lower legs--> 10x5cm R and 5x5cm L wounds with adherent wet necrotic tissue    PROC: cultures sent    right lower leg excisional debridement to and including subcut tissue

## 2019-09-12 NOTE — DIETITIAN INITIAL EVALUATION ADULT. - CONTINUE CURRENT NUTRITION CARE PLAN
Pt is currently eating very well, consuming nearly 100% of meal trays. Add daily MVI (no minerals)./yes

## 2019-09-12 NOTE — DIETITIAN INITIAL EVALUATION ADULT. - ENERGY NEEDS
estimated energy needs: 1265-1580kcal (MSJx1.2-1.5AF) meets criteria for PCM, pressure injuries  estimated protein needs: 64-77g (1.0-1.2g/kg) as above, accounts for BALDEV on CKD, pressure injuries stage II  estimated fluid needs: per LIP

## 2019-09-12 NOTE — CONSULT NOTE ADULT - ASSESSMENT
- Trend O2 sats without O2 while asleep  - Will need KP w/u as outpatient  - Repeat BMP, f/u K  - TSH pending  - Cont tele  - Will likely need PPM, needs to be medically optmized and UTI treated. Will re-evaluate prior to discharge  - Will follow    Plan discussed with EP attending 83 year old female with PMH of DM II, anemia, CKD, DLD, RA, depression brought in by EMS from home for weakness, decreased ambulation, frequent falls and frequent urination.   Found to have [place] with 2:1 AVB.     - Trend O2 sats without O2 while asleep  - Please obtain ABG while on room air  - Will need KP w/u as outpatient  - Repeat BMP, f/u K  - TSH pending  - Cont tele  - Will likely need PPM, needs to be medically optimized and UTI treated.   - Will follow    Plan discussed with EP attending 83 year old female with PMH of DM II, anemia, CKD, DLD, RA, depression brought in by EMS from home for weakness, decreased ambulation, frequent falls and frequent urination.   Found to have [place] with 2:1 AVB.     - Trend O2 sats without O2 while asleep  - Please obtain ABG while on room air  - Will need KP w/u as outpatient  - Repeat BMP, f/u K  - TSH pending  - Hold AV nabor blocking agents  - Cont tele  - Will likely need PPM, needs to be medically optimized and UTI treated.   - Will follow    Plan discussed with EP attending 83 year old female with PMH of DM II, anemia, CKD, DLD, RA, depression brought in by EMS from home for weakness, decreased ambulation, frequent falls and frequent urination.   Found to have [place]  block and periods of 2:1 block with 2:1 A V  Block.     - Trend O2 sats without O2 while asleep  - Please obtain ABG while on room air  - Will need KP w/u as outpatient  - Repeat BMP, f/u K  - TSH pending  - Hold AV nabor blocking agents  - Cont tele  - Will likely need PPM, needs to be medically optimized and UTI treated.   - Will follow    Plan discussed with EP attending 83 year old female with PMH of DM II, anemia, CKD, DLD, RA, depression brought in by EMS from home for weakness, decreased ambulation, frequent falls and frequent urination.   Found to have [place]  block and periods of 2:1 block    plan  - Trend O2 sats without O2 while asleep  - Please obtain ABG while on room air  - Will need KP w/u as outpatient  - Repeat BMP, f/u K  - TSH pending  -if require  debridment  at present  recommend dopamine drip 2 .5mcg/kg/min before  procedure  - Admit to tele  - Will likely need PPM, needs to be medically treated for  UTI and Cellulitis.   - Will follow

## 2019-09-12 NOTE — PROGRESS NOTE ADULT - ATTENDING COMMENTS
Patient seen and examined independently. I agree with the resident's note, physical exam, and plan except as below.  events noted  Vital Signs Last 24 Hrs  T(C): 36.1 (12 Sep 2019 05:03), Max: 36.2 (11 Sep 2019 20:07)  T(F): 96.9 (12 Sep 2019 05:03), Max: 97.1 (11 Sep 2019 20:07)  HR: 56 (12 Sep 2019 08:53) (56 - 67)  BP: 113/83 (12 Sep 2019 08:53) (113/83 - 176/74)  BP(mean): --  RR: 18 (12 Sep 2019 05:03) (18 - 18)  SpO2: 99% (12 Sep 2019 05:03) (98% - 99%)  Pe  nad  aaox2  [id number]  ctabl  soft ntnd+bs +hernia   bilateral edema, ulceration, ecchymosis right 2nd toe  +watson - blood tinged no clots    #frequent falls - debility - pt/rehab - needs SNF  #possible right shoulder dislocation - pt unable raise arm - ortho eval - please obtain sling   < from: Xray Shoulder 2 Views, Right (09.09.19 @ 21:50) >    Findings consistent with acromioclavicular joint separation of   indeterminate age with widening of the AC joint interval measuring 1 cm.   Correlate clinically for acute symptoms.      < end of copied text >    #ecoli uti/cystitis/polymicrobial Le cellulitis - on rocephin - check urine culture, blood culture    Culture - Other (collected 10 Sep 2019 04:26)  Source: .Other Lower extremity drainage  Preliminary Report (12 Sep 2019 08:50):    Numerous Morganella morganii    Numerous Pseudomonas aeruginosa Susceptibility to follow.    Numerous Streptococcus agalactiae (Group B) isolated    Group B streptococci are susceptible to ampicillin,    penicillin and cefazolin, but may be resistant to    erythromycin and clindamycin.    Recommendations for intrapartum prophylaxis for Group B    streptococci are penicillin or ampicillin.  Organism: Morganella morganii (12 Sep 2019 08:46)  Organism: Morganella morganii (12 Sep 2019 08:46)    Culture - Urine (collected 10 Sep 2019 00:10)  Source: .Urine Clean Catch (Midstream)  Final Report (12 Sep 2019 09:03):    >100,000 CFU/ml Escherichia coli  Organism: Escherichia coli (12 Sep 2019 09:03)  Organism: Escherichia coli (12 Sep 2019 09:03)    Culture - Blood (collected 09 Sep 2019 20:25)  Source: .Blood Blood  Preliminary Report (11 Sep 2019 03:01):    No growth to date.    Culture - Blood (collected 09 Sep 2019 20:05)  Source: .Blood Blood  Preliminary Report (11 Sep 2019 03:01):    No growth to date.      #hematuria - still blood tinged - pt on Iv heparin - monitor h/h. bladder sono, uro recs noted  #Right CF dvt - started on iv heparin - monitor PTT - 50-80 - if hematuria worsens may need to call vascular for filter  #BALDEV on CKD III - likely prerenal - IVfs - monitor renal function     Creatinine: 2.0 (09-11 @ 07:30)    Creatinine: 2.1 (09-10 @ 14:30)    Creatinine: 2.4 (09-09 @ 20:05)      #CBD dilation - check MRCP - lfts wnl  #DMII - insulin coverage -   CAPILLARY BLOOD GLUCOSE      POCT Blood Glucose.: 108 mg/dL (12 Sep 2019 09:02)  POCT Blood Glucose.: 222 mg/dL (11 Sep 2019 23:01)  POCT Blood Glucose.: 249 mg/dL (11 Sep 2019 21:50)  POCT Blood Glucose.: 263 mg/dL (11 Sep 2019 16:37)  POCT Blood Glucose.: 346 mg/dL (11 Sep 2019 11:28)      #hxof RA on prednisone .    # bella 1 - this am -transiently - avoid AVnodal blocking agents - cont tele - EPS shahla   spoke with eps team    spoke with son Dejuan in [place] - pt lives with other son here in  who works all day and she has no other support at home. FULL code - discussed GOC

## 2019-09-12 NOTE — CONSULT NOTE ADULT - SUBJECTIVE AND OBJECTIVE BOX
Patient is a 83y old  Female who presents with a chief complaint of Emphysematous cystitis (12 Sep 2019 10:10)        HPI:  83 year old female with PMH of DM II, anemia, CKD, DLD, RA, depression brought in by EMS from home for weakness, decreased ambulation, frequent falls and frequent urination. Patient reports that she had increased falls since her left leg is weak and she fell today twice from the chair. Patient is a poor historian. As per ED documentation, unsure if she had head trauma. Patient's neighbor arrived in ED during workup and states that patient is living in a disheveled home with a son who has developmental delay, described them as "hoarders" and since pt's   about 2 years ago, they are not taken care. Pt also complains of increased urinary frequency and constipation.     During workup in ED, code STEMI was called for EKG showing snus bradycardia with 1st degree AV block and ST elevation in I and aVL. Code STEMI was cancelled as pt was asymptomatic and hemodynamically stable.    Trauma workup in ED was negative for any fractures, however several findings were revealed during workup: CT A/P showed emphysematous cystitis, cholelithiasis and 1.6 cm distal CBD choledocholith. Duplex of lower extremities was positive for right common femoral DVT. (10 Sep 2019 03:34)      REVIEW OF SYSTEMS    [ ] A ten-point review of systems was otherwise negative except as noted.  [ ] Due to altered mental status/intubation, subjective information were not able to be obtained from the patient. History was obtained, to the extent possible, from review of the chart and collateral sources of information.      PAST MEDICAL & SURGICAL HISTORY:  Depression  Chronic kidney disease (CKD)  Dyslipidemia  Hypertension  Diabetes mellitus      Home Medications:  atorvastatin 10 mg oral tablet: 1 tab(s) orally once a day (10 Sep 2019 01:09)  diclofenac 1% topical gel:  (10 Sep 2019 01:10)  levothyroxine 125 mcg (0.125 mg) oral tablet: 1 tab(s) orally once a day (10 Sep 2019 01:09)  losartan 50 mg oral tablet: 1 tab(s) orally once a day (10 Sep 2019 01:09)  predniSONE 20 mg oral tablet: 1 tab(s) orally once a day (10 Sep 2019 01:09)      Allergies:    No Known Allergies      PREVIOUS DIAGNOSTIC TESTING:      ECHO  FINDINGS:    STRESS  FINDINGS:    CATHETERIZATION  FINDINGS:    ELECTROPHYSIOLOGY STUDY  FINDINGS:    CAROTID ULTRASOUND:  FINDINGS    VENOUS DUPLEX SCAN:  FINDINGS:    CHEST CT PULMONARY ANGIO with IV Contrast:  FINDINGS:    FAMILY HISTORY:      SOCIAL HISTORY:    CIGARETTES:    ALCOHOL:      MEDICATIONS  (STANDING):  atorvastatin 10 milliGRAM(s) Oral at bedtime  cefTRIAXone   IVPB 2000 milliGRAM(s) IV Intermittent every 24 hours  chlorhexidine 4% Liquid 1 Application(s) Topical <User Schedule>  dextrose 5%. 1000 milliLiter(s) (50 mL/Hr) IV Continuous <Continuous>  dextrose 50% Injectable 12.5 Gram(s) IV Push once  dextrose 50% Injectable 25 Gram(s) IV Push once  dextrose 50% Injectable 25 Gram(s) IV Push once  heparin  Infusion 900 Unit(s)/Hr (9 mL/Hr) IV Continuous <Continuous>  influenza   Vaccine 0.5 milliLiter(s) IntraMuscular once  insulin glargine Injectable (LANTUS) 15 Unit(s) SubCutaneous every morning  insulin lispro Injectable (HumaLOG) 5 Unit(s) SubCutaneous three times a day before meals  levothyroxine 125 MICROGram(s) Oral daily  lidocaine   Patch 1 Patch Transdermal daily  losartan 50 milliGRAM(s) Oral daily  pantoprazole    Tablet 40 milliGRAM(s) Oral before breakfast  predniSONE   Tablet 20 milliGRAM(s) Oral daily  silver sulfADIAZINE 1% Cream 1 Application(s) Topical every 12 hours  sodium chloride 0.9%. 1000 milliLiter(s) (50 mL/Hr) IV Continuous <Continuous>    MEDICATIONS  (PRN):  dextrose 40% Gel 15 Gram(s) Oral once PRN Blood Glucose LESS THAN 70 milliGRAM(s)/deciliter  glucagon  Injectable 1 milliGRAM(s) IntraMuscular once PRN Glucose LESS THAN 70 milligrams/deciliter      Vital Signs Last 24 Hrs  T(C): 36.1 (12 Sep 2019 05:03), Max: 36.2 (11 Sep 2019 20:07)  T(F): 96.9 (12 Sep 2019 05:03), Max: 97.1 (11 Sep 2019 20:07)  HR: 56 (12 Sep 2019 08:53) (56 - 67)  BP: 113/83 (12 Sep 2019 08:53) (113/83 - 176/74)  BP(mean): --  RR: 18 (12 Sep 2019 05:03) (18 - 18)  SpO2: 99% (12 Sep 2019 05:03) (98% - 99%)    PHYSICAL EXAM:    GENERAL: In no apparent distress, well nourished, and hydrated.  HEAD:  Atraumatic, Normocephalic  EYES: EOMI, PERRLA, conjunctiva and sclera clear  NECK: Supple and normal thyroid.  No JVD or carotid bruit.  Carotid pulse is 2+ bilaterally.  HEART: Regular rate and rhythm; No murmurs, rubs, or gallops.  PULMONARY: Clear to auscultation and perfusion.  No rales, wheezing, or rhonchi bilaterally.  ABDOMEN: Soft, Nontender, Nondistended; Bowel sounds present  EXTREMITIES:  2+ Peripheral Pulses, No clubbing, cyanosis, or edema  NEUROLOGICAL: Grossly nonfocal      INTERPRETATION OF TELEMETRY:    ECG:    I&O's Detail    11 Sep 2019 07:  -  12 Sep 2019 07:00  --------------------------------------------------------  IN:  Total IN: 0 mL    OUT:    Indwelling Catheter - Urethral: 1000 mL  Total OUT: 1000 mL    Total NET: -1000 mL          LABS:                        12.5   14.16 )-----------( 264      ( 11 Sep 2019 07:30 )             38.8         136  |  106  |  75<HH>  ----------------------------<  142<H>  5.5<H>   |  15<L>  |  2.0<H>    Ca    8.9      11 Sep 2019 07:30  Mg     2.2         TPro  4.4<L>  /  Alb  1.9<L>  /  TBili  0.4  /  DBili  x   /  AST  30  /  ALT  45<H>  /  AlkPhos  219<H>          PTT - ( 12 Sep 2019 00:00 )  PTT:61.1 sec    BNP      I&O's Detail    11 Sep 2019 07:01  -  12 Sep 2019 07:00  --------------------------------------------------------  IN:  Total IN: 0 mL    OUT:    Indwelling Catheter - Urethral: 1000 mL  Total OUT: 1000 mL    Total NET: -1000 mL        Daily     Daily     RADIOLOGY & ADDITIONAL STUDIES: Patient is a 83y old  Female who presents with a chief complaint of Emphysematous cystitis (12 Sep 2019 10:10)        HPI:  83 year old female with PMH of DM II, anemia, CKD, DLD, RA, depression brought in by EMS from home for weakness, decreased ambulation, frequent falls and frequent urination. Patient reports that she had increased falls since her left leg is weak and she fell today twice from the chair. Patient is a poor historian. As per ED documentation, unsure if she had head trauma. Patient's neighbor arrived in ED during workup and states that patient is living in a disheveled home with a son who has developmental delay, described them as "hoarders" and since pt's   about 2 years ago, they are not taken care. Pt also complains of increased urinary frequency and constipation.     During workup in ED, code STEMI was called for EKG showing snus bradycardia with 1st degree AV block and ST elevation in I and aVL. Code STEMI was cancelled as pt was asymptomatic and hemodynamically stable.    Trauma workup in ED was negative for any fractures, however several findings were revealed during workup: CT A/P showed emphysematous cystitis, cholelithiasis and 1.6 cm distal CBD choledocholith. Duplex of lower extremities was positive for right common femoral DVT. (10 Sep 2019 03:34)    Overnight patient went bradycardiac, later found to have Wenchebach. EP consulted for further evaluation.    REVIEW OF SYSTEMS  A ten-point review of systems was otherwise negative except as noted.      PAST MEDICAL & SURGICAL HISTORY:  Depression  Chronic kidney disease (CKD)  Dyslipidemia  Hypertension  Diabetes mellitus      Home Medications:  atorvastatin 10 mg oral tablet: 1 tab(s) orally once a day (10 Sep 2019 01:09)  diclofenac 1% topical gel:  (10 Sep 2019 01:10)  levothyroxine 125 mcg (0.125 mg) oral tablet: 1 tab(s) orally once a day (10 Sep 2019 01:09)  losartan 50 mg oral tablet: 1 tab(s) orally once a day (10 Sep 2019 01:09)  predniSONE 20 mg oral tablet: 1 tab(s) orally once a day (10 Sep 2019 01:09)      Allergies:    No Known Allergies      PREVIOUS DIAGNOSTIC TESTING:      ECHO  FINDINGS:  < from: Transthoracic Echocardiogram (09.10.19 @ 12:16) >  Summary:   1. Left ventricular ejection fraction, by visual estimation, is 45 to   50%.   2. Mildly decreased global left ventricular systolic function.   3. Mild-moderate tricuspid regurgitation.   4. PSAP at least 40.   5. Mild to moderate pulmonic valve regurgitation.   6. Estimated pulmonary artery systolic pressure is 46.5 mmHg assuming a   right atrial pressure of 8 mmHg, which is consistent with mild pulmonary   hypertension.    PHYSICIAN INTERPRETATION:  Left Ventricle: The left ventricular internal cavity size is normal. Left   ventricular wall thickness is normal. Global LV systolic function was   mildly decreased. Left ventricular ejection fraction, by visual   estimation, is 45 to 50%.  Right Ventricle: Normal right ventricular size and function.  Left Atrium: Normal left atrial size.  Right Atrium: Normal right atrial size.  Pericardium: There is no evidence of pericardial effusion.  Mitral Valve: Structurally normal mitral valve, with normal leaflet   excursion. Mild mitral valve regurgitation is seen.  Tricuspid Valve: The tricuspid valve is normal in structure.   Mild-moderate tricuspid regurgitation is visualized. Estimated pulmonary   artery systolic pressure is 46.5 mmHg assuming a right atrial pressure of   8 mmHg, which is consistent with mild pulmonary hypertension. PSAP at   least 40.  Aortic Valve: The aortic valve is trileaflet. No evidence of aortic   stenosis. No evidence of aortic valve regurgitation is seen.  Pulmonic Valve: Mild to moderate pulmonic valve regurgitation.    < end of copied text >    STRESS  FINDINGS:    CATHETERIZATION  FINDINGS:    ELECTROPHYSIOLOGY STUDY  FINDINGS:    CAROTID ULTRASOUND:  FINDINGS    VENOUS DUPLEX SCAN:  FINDINGS:  < from: [facility] Duplex Lower Ext Vein Scan, Biladeola (19 @ 22:01) >  INTERPRETATION:  Clinical History / Reason for exam: The patient is a 83   years old female with leg swelling. A venous duplex examination was   performed to evaluate the patient for deep venous thrombosis of the lower   extremities.    Deep venous thrombosis was visualized in right common femoral vein.    Left common femoral, greater saphenous, bilateral superficial femoral and   bilateral popliteal veins were visualized with no evidence of deep venous   thrombosis. All these veins were fully compressible.  There was presence   of spontaneous flow, augmentation with distal compression and phasicity.    The right anterior tibial veins were  patent  rightposterior tibial   veins were  patent  and  right peroneal veins were patent.    The left anterior tibial veins were  patent  left posterior tibial veins   were  patent  and  left peroneal veins were patent.    Impression:    Deep venous thrombosis right common femoral vein.    < end of copied text >    CHEST CT PULMONARY ANGIO with IV Contrast:  FINDINGS:    FAMILY HISTORY:      SOCIAL HISTORY:    CIGARETTES: denies    ALCOHOL: denies      MEDICATIONS  (STANDING):  atorvastatin 10 milliGRAM(s) Oral at bedtime  cefTRIAXone   IVPB 2000 milliGRAM(s) IV Intermittent every 24 hours  chlorhexidine 4% Liquid 1 Application(s) Topical <User Schedule>  dextrose 5%. 1000 milliLiter(s) (50 mL/Hr) IV Continuous <Continuous>  dextrose 50% Injectable 12.5 Gram(s) IV Push once  dextrose 50% Injectable 25 Gram(s) IV Push once  dextrose 50% Injectable 25 Gram(s) IV Push once  heparin  Infusion 900 Unit(s)/Hr (9 mL/Hr) IV Continuous <Continuous>  influenza   Vaccine 0.5 milliLiter(s) IntraMuscular once  insulin glargine Injectable (LANTUS) 15 Unit(s) SubCutaneous every morning  insulin lispro Injectable (HumaLOG) 5 Unit(s) SubCutaneous three times a day before meals  levothyroxine 125 MICROGram(s) Oral daily  lidocaine   Patch 1 Patch Transdermal daily  losartan 50 milliGRAM(s) Oral daily  pantoprazole    Tablet 40 milliGRAM(s) Oral before breakfast  predniSONE   Tablet 20 milliGRAM(s) Oral daily  silver sulfADIAZINE 1% Cream 1 Application(s) Topical every 12 hours  sodium chloride 0.9%. 1000 milliLiter(s) (50 mL/Hr) IV Continuous <Continuous>    MEDICATIONS  (PRN):  dextrose 40% Gel 15 Gram(s) Oral once PRN Blood Glucose LESS THAN 70 milliGRAM(s)/deciliter  glucagon  Injectable 1 milliGRAM(s) IntraMuscular once PRN Glucose LESS THAN 70 milligrams/deciliter      Vital Signs Last 24 Hrs  T(C): 36.1 (12 Sep 2019 05:03), Max: 36.2 (11 Sep 2019 20:07)  T(F): 96.9 (12 Sep 2019 05:03), Max: 97.1 (11 Sep 2019 20:07)  HR: 56 (12 Sep 2019 08:53) (56 - 67)  BP: 113/83 (12 Sep 2019 08:53) (113/83 - 176/74)  BP(mean): --  RR: 18 (12 Sep 2019 05:03) (18 - 18)  SpO2: 99% (12 Sep 2019 05:03) (98% - 99%)    PHYSICAL EXAM:    GENERAL: In no apparent distress, well nourished, and hydrated.  HEART: Regular rate and rhythm; No murmurs, rubs, or gallops.  PULMONARY: Clear to auscultation and perfusion.  No rales, wheezing, or rhonchi bilaterally.  ABDOMEN: Soft, Nontender, Nondistended; Bowel sounds present  EXTREMITIES:  + 2 LE edema. 2+ Peripheral Pulses, No clubbing, cyanosis  NEUROLOGICAL: Grossly nonfocal      INTERPRETATION OF TELEMETRY:  2 degree AVB Type I    ECG:  < from: 12 Lead ECG (19 @ 08:24) >  Ventricular Rate 37 BPM    Atrial Rate 37 BPM    P-R Interval 210 ms    QRS Duration 118 ms    Q-T Interval 476 ms    QTC Calculation(Bezet) 373 ms    P Axis 70 degrees    R Axis -35 degrees    T Axis -19 degrees    Diagnosis Line Marked sinus bradycardia with Mobitz I (Wenckebach) block  Left axis deviation  Left ventricular hypertrophy with QRS widening  T wave abnormality, consider anterior ischemia  Abnormal ECG    < end of copied text >    I&O's Detail    11 Sep 2019 07:  -  12 Sep 2019 07:00  --------------------------------------------------------  IN:  Total IN: 0 mL    OUT:    Indwelling Catheter - Urethral: 1000 mL  Total OUT: 1000 mL    Total NET: -1000 mL          LABS:                        12.5   14.16 )-----------( 264      ( 11 Sep 2019 07:30 )             38.8         136  |  106  |  75<HH>  ----------------------------<  142<H>  5.5<H>   |  15<L>  |  2.0<H>    Ca    8.9      11 Sep 2019 07:30  Mg     2.2         TPro  4.4<L>  /  Alb  1.9<L>  /  TBili  0.4  /  DBili  x   /  AST  30  /  ALT  45<H>  /  AlkPhos  219<H>          PTT - ( 12 Sep 2019 00:00 )  PTT:61.1 sec    BNP      I&O's Detail    11 Sep 2019 07:  -  12 Sep 2019 07:00  --------------------------------------------------------  IN:  Total IN: 0 mL    OUT:    Indwelling Catheter - Urethral: 1000 mL  Total OUT: 1000 mL    Total NET: -1000 mL        Daily     Daily     RADIOLOGY & ADDITIONAL STUDIES: Patient is a 83y old  Female who presents with a chief complaint of Emphysematous cystitis (12 Sep 2019 10:10)        HPI:  83 year old female with PMH of DM II, anemia, CKD, DLD, RA, depression brought in by EMS from home for weakness, decreased ambulation, frequent falls and frequent urination. Patient reports that she had increased falls since her left leg is weak and she fell today twice from the chair. Patient is a poor historian. As per ED documentation, unsure if she had head trauma. Patient's neighbor arrived in ED during workup and states that patient is living in a disheveled home with a son who has developmental delay, described them as "hoarders" and since pt's   about 2 years ago, they are not taken care. Pt also complains of increased urinary frequency and constipation.     During workup in ED, code STEMI was called for EKG showing snus bradycardia with 1st degree AV block and ST elevation in I and aVL. Code STEMI was cancelled as pt was asymptomatic and hemodynamically stable.    Trauma workup in ED was negative for any fractures, however several findings were revealed during workup: CT A/P showed emphysematous cystitis, cholelithiasis and 1.6 cm distal CBD choledocholith. Duplex of lower extremities was positive for right common femoral DVT. (10 Sep 2019 03:34)    Overnight patient went bradycardiac, later found to have Wenchebach. EP consulted for further evaluation.    REVIEW OF SYSTEMS  A ten-point review of systems was otherwise negative except as noted.      PAST MEDICAL & SURGICAL HISTORY:  Depression  Chronic kidney disease (CKD)  Dyslipidemia  Hypertension  Diabetes mellitus      Home Medications:  atorvastatin 10 mg oral tablet: 1 tab(s) orally once a day (10 Sep 2019 01:09)  diclofenac 1% topical gel:  (10 Sep 2019 01:10)  levothyroxine 125 mcg (0.125 mg) oral tablet: 1 tab(s) orally once a day (10 Sep 2019 01:09)  losartan 50 mg oral tablet: 1 tab(s) orally once a day (10 Sep 2019 01:09)  predniSONE 20 mg oral tablet: 1 tab(s) orally once a day (10 Sep 2019 01:09)      Allergies:    No Known Allergies      PREVIOUS DIAGNOSTIC TESTING:      ECHO  FINDINGS:  < from: Transthoracic Echocardiogram (09.10.19 @ 12:16) >  Summary:   1. Left ventricular ejection fraction, by visual estimation, is 45 to   50%.   2. Mildly decreased global left ventricular systolic function.   3. Mild-moderate tricuspid regurgitation.   4. PSAP at least 40.   5. Mild to moderate pulmonic valve regurgitation.   6. Estimated pulmonary artery systolic pressure is 46.5 mmHg assuming a   right atrial pressure of 8 mmHg, which is consistent with mild pulmonary   hypertension.    PHYSICIAN INTERPRETATION:  Left Ventricle: The left ventricular internal cavity size is normal. Left   ventricular wall thickness is normal. Global LV systolic function was   mildly decreased. Left ventricular ejection fraction, by visual   estimation, is 45 to 50%.  Right Ventricle: Normal right ventricular size and function.  Left Atrium: Normal left atrial size.  Right Atrium: Normal right atrial size.  Pericardium: There is no evidence of pericardial effusion.  Mitral Valve: Structurally normal mitral valve, with normal leaflet   excursion. Mild mitral valve regurgitation is seen.  Tricuspid Valve: The tricuspid valve is normal in structure.   Mild-moderate tricuspid regurgitation is visualized. Estimated pulmonary   artery systolic pressure is 46.5 mmHg assuming a right atrial pressure of   8 mmHg, which is consistent with mild pulmonary hypertension. PSAP at   least 40.  Aortic Valve: The aortic valve is trileaflet. No evidence of aortic   stenosis. No evidence of aortic valve regurgitation is seen.  Pulmonic Valve: Mild to moderate pulmonic valve regurgitation.    < end of copied text >    STRESS  FINDINGS:    CATHETERIZATION  FINDINGS:    ELECTROPHYSIOLOGY STUDY  FINDINGS:    CAROTID ULTRASOUND:  FINDINGS    VENOUS DUPLEX SCAN:  FINDINGS:  < from: VA Duplex Lower Ext Vein Scan, Biladeola (19 @ 22:01) >  INTERPRETATION:  Clinical History / Reason for exam: The patient is a 83   years old female with leg swelling. A venous duplex examination was   performed to evaluate the patient for deep venous thrombosis of the lower   extremities.    Deep venous thrombosis was visualized in right common femoral vein.    Left common femoral, greater saphenous, bilateral superficial femoral and   bilateral popliteal veins were visualized with no evidence of deep venous   thrombosis. All these veins were fully compressible.  There was presence   of spontaneous flow, augmentation with distal compression and phasicity.    The right anterior tibial veins were  patent  rightposterior tibial   veins were  patent  and  right peroneal veins were patent.    The left anterior tibial veins were  patent  left posterior tibial veins   were  patent  and  left peroneal veins were patent.    Impression:    Deep venous thrombosis right common femoral vein.    < end of copied text >    CHEST CT PULMONARY ANGIO with IV Contrast:  FINDINGS:    FAMILY HISTORY:  non-contributory    SOCIAL HISTORY:    CIGARETTES: denies    ALCOHOL: denies      MEDICATIONS  (STANDING):  atorvastatin 10 milliGRAM(s) Oral at bedtime  cefTRIAXone   IVPB 2000 milliGRAM(s) IV Intermittent every 24 hours  chlorhexidine 4% Liquid 1 Application(s) Topical <User Schedule>  heparin  Infusion 900 Unit(s)/Hr (9 mL/Hr) IV Continuous <Continuous>  influenza   Vaccine 0.5 milliLiter(s) IntraMuscular once  insulin glargine Injectable (LANTUS) 15 Unit(s) SubCutaneous every morning  insulin lispro Injectable (HumaLOG) 5 Unit(s) SubCutaneous three times a day before meals  levothyroxine 125 MICROGram(s) Oral daily  lidocaine   Patch 1 Patch Transdermal daily  losartan 50 milliGRAM(s) Oral daily  pantoprazole    Tablet 40 milliGRAM(s) Oral before breakfast  predniSONE   Tablet 20 milliGRAM(s) Oral daily  silver sulfADIAZINE 1% Cream 1 Application(s) Topical every 12 hours  sodium chloride 0.9%. 1000 milliLiter(s) (50 mL/Hr) IV Continuous <Continuous>    MEDICATIONS  (PRN):  dextrose 40% Gel 15 Gram(s) Oral once PRN Blood Glucose LESS THAN 70 milliGRAM(s)/deciliter  glucagon  Injectable 1 milliGRAM(s) IntraMuscular once PRN Glucose LESS THAN 70 milligrams/deciliter      Vital Signs Last 24 Hrs  T(C): 36.1 (12 Sep 2019 05:03), Max: 36.2 (11 Sep 2019 20:07)  T(F): 96.9 (12 Sep 2019 05:03), Max: 97.1 (11 Sep 2019 20:07)  HR: 56 (12 Sep 2019 08:53) (56 - 67)  BP: 113/83 (12 Sep 2019 08:53) (113/83 - 176/74)  BP(mean): --  RR: 18 (12 Sep 2019 05:03) (18 - 18)  SpO2: 99% (12 Sep 2019 05:03) (98% - 99%)    PHYSICAL EXAM:    GENERAL: In no apparent distress, well nourished, and hydrated.  HEART: Regular rate and rhythm; No murmurs, rubs, or gallops.  PULMONARY: Clear to auscultation and perfusion.  No rales, wheezing, or rhonchi bilaterally.  ABDOMEN: Soft, Nontender, Nondistended; Bowel sounds present. + abdominal hernia  EXTREMITIES:  + 2 LE edema. 2+ Peripheral Pulses, No clubbing, cyanosis  NEUROLOGICAL: Grossly nonfocal. AO x3, speech clear      INTERPRETATION OF TELEMETRY:  2 degree AVB Type I    ECG:  < from: 12 Lead ECG (19 @ 08:24) >  Ventricular Rate 37 BPM    Atrial Rate 37 BPM    P-R Interval 210 ms    QRS Duration 118 ms    Q-T Interval 476 ms    QTC Calculation(Bezet) 373 ms    P Axis 70 degrees    R Axis -35 degrees    T Axis -19 degrees    Diagnosis Line Marked sinus bradycardia with Mobitz I (Wenckebach) block  Left axis deviation  Left ventricular hypertrophy with QRS widening  T wave abnormality, consider anterior ischemia  Abnormal ECG    < end of copied text >    I&O's Detail    11 Sep 2019 07:  -  12 Sep 2019 07:00  --------------------------------------------------------  IN:  Total IN: 0 mL    OUT:    Indwelling Catheter - Urethral: 1000 mL  Total OUT: 1000 mL    Total NET: -1000 mL    LABS:                        12.5   14.16 )-----------( 264      ( 11 Sep 2019 07:30 )             38.8         136  |  106  |  75<HH>  ----------------------------<  142<H>  5.5<H>   |  15<L>  |  2.0<H>    Ca    8.9      11 Sep 2019 07:30  Mg     2.2         TPro  4.4<L>  /  Alb  1.9<L>  /  TBili  0.4  /  DBili  x   /  AST  30  /  ALT  45<H>  /  AlkPhos  219<H>        PTT - ( 12 Sep 2019 00:00 )  PTT:61.1 sec    BNP      I&O's Detail    11 Sep 2019 07:01  -  12 Sep 2019 07:00  --------------------------------------------------------  IN:  Total IN: 0 mL    OUT:    Indwelling Catheter - Urethral: 1000 mL  Total OUT: 1000 mL    Total NET: -1000 mL      RADIOLOGY & ADDITIONAL STUDIES:  < from: Xray Chest 1 View- PORTABLE-Urgent (19 @ 21:51) >    Impression:      No radiographic evidence of acute cardiopulmonary disease.    < end of copied text >

## 2019-09-12 NOTE — DIETITIAN INITIAL EVALUATION ADULT. - OTHER INFO
Pt presented to ED c/o weakness, decreased mabulation, frequent falls, and frequent urination. Pt admitted for multiple comorbidities. Pt with asymptomatic bradycardia, UTI and + UA with hematuria (sepsis ruled out on admit), BALDEV on CKD 3 (improving), Common bile duct choledocholith (MRCP for CBD dilation pending). Hx of DM2. Elder self-neglect, unsafe living condition.

## 2019-09-12 NOTE — PROGRESS NOTE ADULT - SUBJECTIVE AND OBJECTIVE BOX
FREDI ORTIZ  83y, Female      OVERNIGHT EVENTS:    no fevers, no abdominal pain, no pain LEs    VITALS:  T(F): 96.9, Max: 97.1 (09-11-19 @ 20:07)  HR: 56  BP: 113/83  RR: 18Vital Signs Last 24 Hrs  T(C): 36.1 (12 Sep 2019 05:03), Max: 36.2 (11 Sep 2019 20:07)  T(F): 96.9 (12 Sep 2019 05:03), Max: 97.1 (11 Sep 2019 20:07)  HR: 56 (12 Sep 2019 08:53) (56 - 67)  BP: 113/83 (12 Sep 2019 08:53) (113/83 - 176/74)  BP(mean): --  RR: 18 (12 Sep 2019 05:03) (18 - 18)  SpO2: 99% (12 Sep 2019 05:03) (98% - 99%)    TESTS & MEASUREMENTS:                        12.5   14.16 )-----------( 264      ( 11 Sep 2019 07:30 )             38.8     09-11    136  |  106  |  75<HH>  ----------------------------<  142<H>  5.5<H>   |  15<L>  |  2.0<H>    Ca    8.9      11 Sep 2019 07:30  Mg     2.2     09-11    TPro  4.4<L>  /  Alb  1.9<L>  /  TBili  0.4  /  DBili  x   /  AST  30  /  ALT  45<H>  /  AlkPhos  219<H>  09-11    LIVER FUNCTIONS - ( 11 Sep 2019 07:30 )  Alb: 1.9 g/dL / Pro: 4.4 g/dL / ALK PHOS: 219 U/L / ALT: 45 U/L / AST: 30 U/L / GGT: x             Culture - Other (collected 09-10-19 @ 04:26)  Source: .Other Lower extremity drainage  Preliminary Report (09-12-19 @ 08:50):    Numerous Morganella morganii    Numerous Pseudomonas aeruginosa Susceptibility to follow.    Numerous Streptococcus agalactiae (Group B) isolated    Group B streptococci are susceptible to ampicillin,    penicillin and cefazolin, but may be resistant to    erythromycin and clindamycin.    Recommendations for intrapartum prophylaxis for Group B    streptococci are penicillin or ampicillin.  Organism: Morganella morganii (09-12-19 @ 08:46)  Organism: Morganella morganii (09-12-19 @ 08:46)      -  Amikacin: S <=16      -  Amoxicillin/Clavulanic Acid: R >16/8      -  Ampicillin: R >16 These ampicillin results predict results for amoxicillin      -  Ampicillin/Sulbactam: R >16/8 Enterobacter, Citrobacter, and Serratia may develop resistance during prolonged therapy (3-4 days)      -  Aztreonam: R >16      -  Cefazolin: R >16 Enterobacter, Citrobacter, and Serratia may develop resistance during prolonged therapy (3-4 days)      -  Cefepime: S <=2      -  Cefoxitin: R >16      -  Ceftazidime/Avibactam: S <=4      -  Ceftolozane/tazobactam: R 8      -  Ceftriaxone: R 8 Enterobacter, Citrobacter, and Serratia may develop resistance during prolonged therapy      -  Ciprofloxacin: S <=1      -  Ertapenem: I 1      -  Gentamicin: S 4      -  Imipenem: I 2      -  Levofloxacin: S <=2      -  Meropenem: S <=1      -  Piperacillin/Tazobactam: S <=8      -  Tobramycin: I 8      -  Trimethoprim/Sulfamethoxazole: R >2/38      Method Type: AIRAM    Culture - Urine (collected 09-10-19 @ 00:10)  Source: .Urine Clean Catch (Midstream)  Final Report (09-12-19 @ 09:03):    >100,000 CFU/ml Escherichia coli  Organism: Escherichia coli (09-12-19 @ 09:03)  Organism: Escherichia coli (09-12-19 @ 09:03)      -  Amikacin: S <=16      -  Ampicillin: S <=8 These ampicillin results predict results for amoxicillin      -  Ampicillin/Sulbactam: S <=8/4 Enterobacter, Citrobacter, and Serratia may develop resistance during prolonged therapy (3-4 days)      -  Aztreonam: S <=4      -  Cefazolin: S <=8 ([id number]) For uncomplicated UTI with K. pneumoniae, E. coli, or P. mirablis: AIRMA <=16 is sensitive and AIRAM >=32 is resistant. This also predicts results for oral agents cefaclor, cefdinir, cefpodoxime, cefprozil, cefuroxime axetil, cephalexin and locarbef for uncomplicated UTI. Note that some isolates may be susceptible to these agents while testing resistant to cefazolin.      -  Cefepime: S <=4      -  Cefoxitin: S <=8      -  Ceftriaxone: S <=1 Enterobacter, Citrobacter, and Serratia may develop resistance during prolonged therapy      -  Ciprofloxacin: S <=1      -  Gentamicin: S <=4      -  Imipenem: S <=1      -  Levofloxacin: S <=2      -  Meropenem: S <=1      -  Nitrofurantoin: S <=32 Should not be used to treat pyelonephritis      -  Piperacillin/Tazobactam: S <=16      -  Tigecycline: S <=2      -  Tobramycin: S <=4      -  Trimethoprim/Sulfamethoxazole: S <=2/38      Method Type: AIRAM    Culture - Blood (collected 09-09-19 @ 20:25)  Source: .Blood Blood  Preliminary Report (09-11-19 @ 03:01):    No growth to date.    Culture - Blood (collected 09-09-19 @ 20:05)  Source: .Blood Blood  Preliminary Report (09-11-19 @ 03:01):    No growth to date.            RADIOLOGY & ADDITIONAL TESTS:    ANTIBIOTICS:  cefTRIAXone   IVPB 2000 milliGRAM(s) IV Intermittent every 24 hours

## 2019-09-12 NOTE — CONSULT NOTE ADULT - SUBJECTIVE AND OBJECTIVE BOX
HPI:  83 year old female with PMH of DM II, anemia, CKD, DLD, presents for generalized weakness and frequent falls. Patient is a poor historian. States that she lives at home with her son. She has been experiencing episodes of lightheadedness for the past 2 weeks. She is unsure of any precipitating factors. Patient reports that she's fallen three times a few days ago upon. Notes she does feel general weak and lightheaded prior to falls. Denies associated SOB, chest pain, and palpitations.  She is not followed by cardiologist. Unsure if she had catheterization, stress test, or echocardiogram in the past.       During workup in ED, code STEMI was called for EKG showing sinus bradycardia with 1st degree AV block and ST elevation in I and aVL. Code STEMI was cancelled as pt was asymptomatic and hemodynamically stable.      PAST MEDICAL & SURGICAL HISTORY  Depression  Chronic kidney disease (CKD)  Dyslipidemia  Hypertension  Diabetes mellitus  Anemia       FAMILY HISTORY:  FAMILY HISTORY:  Brother: MI at age 70      SOCIAL HISTORY:  Nonsmoker  No alcohol or drug use.    ALLERGIES:  No Known Allergies      MEDICATIONS:  MEDICATIONS  (STANDING):  atorvastatin 10 milliGRAM(s) Oral at bedtime  cefTRIAXone   IVPB 2000 milliGRAM(s) IV Intermittent every 24 hours  chlorhexidine 4% Liquid 1 Application(s) Topical <User Schedule>  dextrose 5%. 1000 milliLiter(s) (50 mL/Hr) IV Continuous <Continuous>  dextrose 50% Injectable 12.5 Gram(s) IV Push once  dextrose 50% Injectable 25 Gram(s) IV Push once  dextrose 50% Injectable 25 Gram(s) IV Push once  heparin  Infusion 900 Unit(s)/Hr (9 mL/Hr) IV Continuous <Continuous>  influenza   Vaccine 0.5 milliLiter(s) IntraMuscular once  insulin glargine Injectable (LANTUS) 15 Unit(s) SubCutaneous every morning  insulin lispro Injectable (HumaLOG) 5 Unit(s) SubCutaneous three times a day before meals  levothyroxine 125 MICROGram(s) Oral daily  lidocaine   Patch 1 Patch Transdermal daily  losartan 50 milliGRAM(s) Oral daily  pantoprazole    Tablet 40 milliGRAM(s) Oral before breakfast  predniSONE   Tablet 20 milliGRAM(s) Oral daily  silver sulfADIAZINE 1% Cream 1 Application(s) Topical every 12 hours  sodium chloride 0.9%. 1000 milliLiter(s) (50 mL/Hr) IV Continuous <Continuous>    MEDICATIONS  (PRN):  dextrose 40% Gel 15 Gram(s) Oral once PRN Blood Glucose LESS THAN 70 milliGRAM(s)/deciliter  glucagon  Injectable 1 milliGRAM(s) IntraMuscular once PRN Glucose LESS THAN 70 milligrams/deciliter      HOME MEDICATIONS:  Home Medications:  atorvastatin 10 mg oral tablet: 1 tab(s) orally once a day (10 Sep 2019 01:09)  diclofenac 1% topical gel:  (10 Sep 2019 01:10)  levothyroxine 125 mcg (0.125 mg) oral tablet: 1 tab(s) orally once a day (10 Sep 2019 01:09)  losartan 50 mg oral tablet: 1 tab(s) orally once a day (10 Sep 2019 01:09)  predniSONE 20 mg oral tablet: 1 tab(s) orally once a day (10 Sep 2019 01:09)      VITALS:   T(F): 96.9 (09-12 @ 05:03), Max: 97.4 (09-11 @ 05:29)  HR: 56 (09-12 @ 08:53) (55 - 78)  BP: 113/83 (09-12 @ 08:53) (113/83 - 176/84)  BP(mean): --  RR: 18 (09-12 @ 05:03) (18 - 18)  SpO2: 99% (09-12 @ 05:03) (96% - 99%)    I&O's Summary    11 Sep 2019 07:01  -  12 Sep 2019 07:00  --------------------------------------------------------  IN: 0 mL / OUT: 1000 mL / NET: -1000 mL        REVIEW OF SYSTEMS:  CONSTITUTIONAL: + generalized weakness No fevers or chills  EYES: No visual changes  ENT: No  throat pain   NECK: No pain or stiffness  RESPIRATORY: No cough, wheezing, hemoptysis; No shortness of breath  CARDIOVASCULAR: No chest pain or palpitations. + Leg swelling  GASTROINTESTINAL: No abdominal or epigastric pain. No nausea, vomiting, or hematemesis;  GENITOURINARY: + Urinary frequency   NEUROLOGICAL: No numbness or weakness  SKIN: No itching, no rashes      PHYSICAL EXAM:  NEURO: patient is awake , alert and oriented  GEN: Not in acute distress  NECK: no thyroid enlargement, no JVD  LUNGS: Clear to auscultation bilaterally   CARDIOVASCULAR: S1/S2 present, RRR , no murmurs or rubs, no carotid bruits,  + PP bilaterally 2-3+ edema bilaterally up to tibial plateau   ABD: Soft, non-tender, non-distended, +BS  SKIN: Intact. Bruising over lying right knee and dorsum of right foot.    LABS:                        12.5   14.16 )-----------( 264      ( 11 Sep 2019 07:30 )             38.8     09-11    136  |  106  |  75<HH>  ----------------------------<  142<H>  5.5<H>   |  15<L>  |  2.0<H>    Ca    8.9      11 Sep 2019 07:30  Mg     2.2     09-11    TPro  4.4<L>  /  Alb  1.9<L>  /  TBili  0.4  /  DBili  x   /  AST  30  /  ALT  45<H>  /  AlkPhos  219<H>  09-11    PTT - ( 12 Sep 2019 00:00 )  PTT:61.1 sec    Troponin T, Serum: 0.03:        RADIOLOGY:  -CXR 9/9/19: No radiographic evidence of acute cardiopulmonary disease.  -TTE 9/10/19:     1. Left ventricular ejection fraction, by visual estimation, is 45 to   50%.   2. Mildly decreased global left ventricular systolic function.   3. Mild-moderate tricuspid regurgitation.   4. PSAP at least 40.   5. Mild to moderate pulmonic valve regurgitation.   6. Estimated pulmonary artery systolic pressure is 46.5 mmHg assuming a   right atrial pressure of 8 mmHg, which is consistent with mild pulmonary   hypertension.    ECG:      TELEMETRY EVENTS: HPI:  83 year old female with PMH of DM II, anemia, CKD, DLD, presents for generalized weakness and frequent falls. Patient is a poor historian. States that she lives at home with her son. She has been experiencing episodes of lightheadedness for the past 2 weeks. She is unsure of any precipitating factors. Patient reports that she's fallen three times a few days ago upon. Notes she does feel general weak and lightheaded prior to falls. Denies associated SOB, chest pain, and palpitations.  She is not followed by cardiologist. Unsure if she had catheterization, stress test, or echocardiogram in the past.     Code STEMI was called in ED for EKG showing sinus bradycardia with 1st degree AV block and ST elevation in I and aVL. Code STEMI was cancelled as pt was asymptomatic and hemodynamically stable. ST Elevations are not consistent with acute MI      PAST MEDICAL & SURGICAL HISTORY  Depression  Chronic kidney disease (CKD)  Dyslipidemia  Hypertension  Diabetes mellitus  Anemia       FAMILY HISTORY:  FAMILY HISTORY:  Brother: MI at age 70      SOCIAL HISTORY:  Nonsmoker  No alcohol or drug use.    ALLERGIES:  No Known Allergies      MEDICATIONS:  MEDICATIONS  (STANDING):  atorvastatin 10 milliGRAM(s) Oral at bedtime  cefTRIAXone   IVPB 2000 milliGRAM(s) IV Intermittent every 24 hours  chlorhexidine 4% Liquid 1 Application(s) Topical <User Schedule>  dextrose 5%. 1000 milliLiter(s) (50 mL/Hr) IV Continuous <Continuous>  dextrose 50% Injectable 12.5 Gram(s) IV Push once  dextrose 50% Injectable 25 Gram(s) IV Push once  dextrose 50% Injectable 25 Gram(s) IV Push once  heparin  Infusion 900 Unit(s)/Hr (9 mL/Hr) IV Continuous <Continuous>  influenza   Vaccine 0.5 milliLiter(s) IntraMuscular once  insulin glargine Injectable (LANTUS) 15 Unit(s) SubCutaneous every morning  insulin lispro Injectable (HumaLOG) 5 Unit(s) SubCutaneous three times a day before meals  levothyroxine 125 MICROGram(s) Oral daily  lidocaine   Patch 1 Patch Transdermal daily  losartan 50 milliGRAM(s) Oral daily  pantoprazole    Tablet 40 milliGRAM(s) Oral before breakfast  predniSONE   Tablet 20 milliGRAM(s) Oral daily  silver sulfADIAZINE 1% Cream 1 Application(s) Topical every 12 hours  sodium chloride 0.9%. 1000 milliLiter(s) (50 mL/Hr) IV Continuous <Continuous>    MEDICATIONS  (PRN):  dextrose 40% Gel 15 Gram(s) Oral once PRN Blood Glucose LESS THAN 70 milliGRAM(s)/deciliter  glucagon  Injectable 1 milliGRAM(s) IntraMuscular once PRN Glucose LESS THAN 70 milligrams/deciliter      HOME MEDICATIONS:  Home Medications:  atorvastatin 10 mg oral tablet: 1 tab(s) orally once a day (10 Sep 2019 01:09)  diclofenac 1% topical gel:  (10 Sep 2019 01:10)  levothyroxine 125 mcg (0.125 mg) oral tablet: 1 tab(s) orally once a day (10 Sep 2019 01:09)  losartan 50 mg oral tablet: 1 tab(s) orally once a day (10 Sep 2019 01:09)  predniSONE 20 mg oral tablet: 1 tab(s) orally once a day (10 Sep 2019 01:09)      VITALS:   T(F): 96.9 (09-12 @ 05:03), Max: 97.4 (09-11 @ 05:29)  HR: 56 (09-12 @ 08:53) (55 - 78)  BP: 113/83 (09-12 @ 08:53) (113/83 - 176/84)  BP(mean): --  RR: 18 (09-12 @ 05:03) (18 - 18)  SpO2: 99% (09-12 @ 05:03) (96% - 99%)    I&O's Summary    11 Sep 2019 07:01  -  12 Sep 2019 07:00  --------------------------------------------------------  IN: 0 mL / OUT: 1000 mL / NET: -1000 mL        REVIEW OF SYSTEMS:  CONSTITUTIONAL: + generalized weakness No fevers or chills  EYES: No visual changes  ENT: No  throat pain   NECK: No pain or stiffness  RESPIRATORY: No cough, wheezing, hemoptysis; No shortness of breath  CARDIOVASCULAR: No chest pain or palpitations. + Leg swelling  GASTROINTESTINAL: No abdominal or epigastric pain. No nausea, vomiting, or hematemesis;  GENITOURINARY: + Urinary frequency   NEUROLOGICAL: No numbness or weakness  SKIN: No itching, no rashes      PHYSICAL EXAM:  NEURO: patient is awake , alert and oriented  GEN: Not in acute distress  NECK: no thyroid enlargement, no JVD  LUNGS: Clear to auscultation bilaterally   CARDIOVASCULAR: S1/S2 present, RRR , no murmurs or rubs, no carotid bruits,  + PP bilaterally 2-3+ edema bilaterally up to tibial plateau   ABD: Soft, non-tender, non-distended, +BS  SKIN: Intact. Bruising over lying right knee and dorsum of right foot. Ace wrap overlying BLE    LABS:                        12.5   14.16 )-----------( 264      ( 11 Sep 2019 07:30 )             38.8     09-11    136  |  106  |  75<HH>  ----------------------------<  142<H>  5.5<H>   |  15<L>  |  2.0<H>    Ca    8.9      11 Sep 2019 07:30  Mg     2.2     09-11    TPro  4.4<L>  /  Alb  1.9<L>  /  TBili  0.4  /  DBili  x   /  AST  30  /  ALT  45<H>  /  AlkPhos  219<H>  09-11    PTT - ( 12 Sep 2019 00:00 )  PTT:61.1 sec    Troponin T, Serum: 0.03:        RADIOLOGY:  -CXR 9/9/19: No radiographic evidence of acute cardiopulmonary disease.  -TTE 9/10/19:     1. Left ventricular ejection fraction, by visual estimation, is 45 to   50%.   2. Mildly decreased global left ventricular systolic function.   3. Mild-moderate tricuspid regurgitation.   4. PSAP at least 40.   5. Mild to moderate pulmonic valve regurgitation.   6. Estimated pulmonary artery systolic pressure is 46.5 mmHg assuming a   right atrial pressure of 8 mmHg, which is consistent with mild pulmonary   hypertension.    ECG:  EKG Sinus Bradycardia at 52 Mobitz Type I.     TELEMETRY EVENTS: HPI:  83 year old female with PMH of DM II, anemia, CKD, DLD, presents for generalized weakness and frequent falls. Patient is a poor historian. States that she lives at home with her son. She has been experiencing episodes of lightheadedness for the past 2 weeks. She is unsure of any precipitating factors. Patient reports that she's fallen three times a few days ago upon. Notes she does feel general weak and lightheaded prior to falls. Denies associated SOB, chest pain, and palpitations.  She is not followed by cardiologist. Unsure if she had catheterization, stress test, or echocardiogram in the past.     Code STEMI was called in ED for EKG showing sinus bradycardia with 1st degree AV block and ST elevation in I and aVL. Code STEMI was cancelled as pt was asymptomatic and hemodynamically stable. ST Elevations are not consistent with acute MI      PAST MEDICAL & SURGICAL HISTORY  Depression  Chronic kidney disease (CKD)  Dyslipidemia  Hypertension  Diabetes mellitus  Anemia       FAMILY HISTORY:  FAMILY HISTORY:  Brother: MI at age 70      SOCIAL HISTORY:  Nonsmoker  No alcohol or drug use.    ALLERGIES:  No Known Allergies      MEDICATIONS:  MEDICATIONS  (STANDING):  atorvastatin 10 milliGRAM(s) Oral at bedtime  cefTRIAXone   IVPB 2000 milliGRAM(s) IV Intermittent every 24 hours  chlorhexidine 4% Liquid 1 Application(s) Topical <User Schedule>  dextrose 5%. 1000 milliLiter(s) (50 mL/Hr) IV Continuous <Continuous>  dextrose 50% Injectable 12.5 Gram(s) IV Push once  dextrose 50% Injectable 25 Gram(s) IV Push once  dextrose 50% Injectable 25 Gram(s) IV Push once  heparin  Infusion 900 Unit(s)/Hr (9 mL/Hr) IV Continuous <Continuous>  influenza   Vaccine 0.5 milliLiter(s) IntraMuscular once  insulin glargine Injectable (LANTUS) 15 Unit(s) SubCutaneous every morning  insulin lispro Injectable (HumaLOG) 5 Unit(s) SubCutaneous three times a day before meals  levothyroxine 125 MICROGram(s) Oral daily  lidocaine   Patch 1 Patch Transdermal daily  losartan 50 milliGRAM(s) Oral daily  pantoprazole    Tablet 40 milliGRAM(s) Oral before breakfast  predniSONE   Tablet 20 milliGRAM(s) Oral daily  silver sulfADIAZINE 1% Cream 1 Application(s) Topical every 12 hours  sodium chloride 0.9%. 1000 milliLiter(s) (50 mL/Hr) IV Continuous <Continuous>    MEDICATIONS  (PRN):  dextrose 40% Gel 15 Gram(s) Oral once PRN Blood Glucose LESS THAN 70 milliGRAM(s)/deciliter  glucagon  Injectable 1 milliGRAM(s) IntraMuscular once PRN Glucose LESS THAN 70 milligrams/deciliter      HOME MEDICATIONS:  Home Medications:  atorvastatin 10 mg oral tablet: 1 tab(s) orally once a day (10 Sep 2019 01:09)  diclofenac 1% topical gel:  (10 Sep 2019 01:10)  levothyroxine 125 mcg (0.125 mg) oral tablet: 1 tab(s) orally once a day (10 Sep 2019 01:09)  losartan 50 mg oral tablet: 1 tab(s) orally once a day (10 Sep 2019 01:09)  predniSONE 20 mg oral tablet: 1 tab(s) orally once a day (10 Sep 2019 01:09)      VITALS:   T(F): 96.9 (09-12 @ 05:03), Max: 97.4 (09-11 @ 05:29)  HR: 56 (09-12 @ 08:53) (55 - 78)  BP: 113/83 (09-12 @ 08:53) (113/83 - 176/84)  BP(mean): --  RR: 18 (09-12 @ 05:03) (18 - 18)  SpO2: 99% (09-12 @ 05:03) (96% - 99%)    I&O's Summary    11 Sep 2019 07:01  -  12 Sep 2019 07:00  --------------------------------------------------------  IN: 0 mL / OUT: 1000 mL / NET: -1000 mL        REVIEW OF SYSTEMS:  CONSTITUTIONAL: + generalized weakness No fevers or chills  EYES: No visual changes  ENT: No  throat pain   NECK: No pain or stiffness  RESPIRATORY: No cough, wheezing, hemoptysis; No shortness of breath  CARDIOVASCULAR: No chest pain or palpitations. + Leg swelling  GASTROINTESTINAL: No abdominal or epigastric pain. No nausea, vomiting, or hematemesis;  GENITOURINARY: + Urinary frequency   NEUROLOGICAL: No numbness or weakness  SKIN: No itching, no rashes      PHYSICAL EXAM:  NEURO: patient is awake , alert and oriented  GEN: Not in acute distress  NECK: no thyroid enlargement, no JVD  LUNGS: Clear to auscultation bilaterally   CARDIOVASCULAR: S1/S2 present, RRR , no murmurs or rubs, no carotid bruits,  + PP bilaterally 2-3+ edema bilaterally up to tibial plateau   ABD: Soft, non-tender, non-distended, +BS  SKIN: Intact. Bruising over lying right knee and dorsum of right foot. Ace wrap overlying BLE    LABS:                        12.5   14.16 )-----------( 264      ( 11 Sep 2019 07:30 )             38.8     09-11    136  |  106  |  75<HH>  ----------------------------<  142<H>  5.5<H>   |  15<L>  |  2.0<H>    Ca    8.9      11 Sep 2019 07:30  Mg     2.2     09-11    TPro  4.4<L>  /  Alb  1.9<L>  /  TBili  0.4  /  DBili  x   /  AST  30  /  ALT  45<H>  /  AlkPhos  219<H>  09-11    PTT - ( 12 Sep 2019 00:00 )  PTT:61.1 sec    Troponin T, Serum: 0.03:        RADIOLOGY:  -CXR 9/9/19: No radiographic evidence of acute cardiopulmonary disease.  -TTE 9/10/19:     1. Left ventricular ejection fraction, by visual estimation, is 45 to   50%.   2. Mildly decreased global left ventricular systolic function.   3. Mild-moderate tricuspid regurgitation.   4. PSAP at least 40.   5. Mild to moderate pulmonic valve regurgitation.   6. Estimated pulmonary artery systolic pressure is 46.5 mmHg assuming a   right atrial pressure of 8 mmHg, which is consistent with mild pulmonary   hypertension.    ECG:  EKG Sinus Bradycardia at 52. Second degree AVB Mobitz Type I. Periods of 2:1 block     TELEMETRY EVENTS: HPI:  83 year old female with PMH of DM II, anemia, CKD, DLD, presents for generalized weakness and frequent falls. Patient is a poor historian. States that she lives at home with her son. She has been experiencing episodes of lightheadedness for the past 2 weeks. She is unsure of any precipitating factors. Patient reports that she's fallen three times a few days ago upon. Notes she does feel general weak and lightheaded prior to falls. Denies associated SOB, chest pain, and palpitations.  She is not followed by cardiologist. Unsure if she had catheterization, stress test, or echocardiogram in the past.     Code STEMI was called in ED for EKG showing sinus bradycardia with 1st degree AV block and ST elevation in I and aVL. Code STEMI was cancelled as pt was asymptomatic and hemodynamically stable. ST Elevations are not consistent with acute MI      PAST MEDICAL & SURGICAL HISTORY  Depression  Chronic kidney disease (CKD)  Dyslipidemia  Hypertension  Diabetes mellitus  Anemia       FAMILY HISTORY:  FAMILY HISTORY:  Brother: MI at age 70      SOCIAL HISTORY:  Nonsmoker  No alcohol or drug use.    ALLERGIES:  No Known Allergies      MEDICATIONS:  MEDICATIONS  (STANDING):  atorvastatin 10 milliGRAM(s) Oral at bedtime  cefTRIAXone   IVPB 2000 milliGRAM(s) IV Intermittent every 24 hours  chlorhexidine 4% Liquid 1 Application(s) Topical <User Schedule>  dextrose 5%. 1000 milliLiter(s) (50 mL/Hr) IV Continuous <Continuous>  dextrose 50% Injectable 12.5 Gram(s) IV Push once  dextrose 50% Injectable 25 Gram(s) IV Push once  dextrose 50% Injectable 25 Gram(s) IV Push once  heparin  Infusion 900 Unit(s)/Hr (9 mL/Hr) IV Continuous <Continuous>  influenza   Vaccine 0.5 milliLiter(s) IntraMuscular once  insulin glargine Injectable (LANTUS) 15 Unit(s) SubCutaneous every morning  insulin lispro Injectable (HumaLOG) 5 Unit(s) SubCutaneous three times a day before meals  levothyroxine 125 MICROGram(s) Oral daily  lidocaine   Patch 1 Patch Transdermal daily  losartan 50 milliGRAM(s) Oral daily  pantoprazole    Tablet 40 milliGRAM(s) Oral before breakfast  predniSONE   Tablet 20 milliGRAM(s) Oral daily  silver sulfADIAZINE 1% Cream 1 Application(s) Topical every 12 hours  sodium chloride 0.9%. 1000 milliLiter(s) (50 mL/Hr) IV Continuous <Continuous>    MEDICATIONS  (PRN):  dextrose 40% Gel 15 Gram(s) Oral once PRN Blood Glucose LESS THAN 70 milliGRAM(s)/deciliter  glucagon  Injectable 1 milliGRAM(s) IntraMuscular once PRN Glucose LESS THAN 70 milligrams/deciliter      HOME MEDICATIONS:  Home Medications:  atorvastatin 10 mg oral tablet: 1 tab(s) orally once a day (10 Sep 2019 01:09)  diclofenac 1% topical gel:  (10 Sep 2019 01:10)  levothyroxine 125 mcg (0.125 mg) oral tablet: 1 tab(s) orally once a day (10 Sep 2019 01:09)  losartan 50 mg oral tablet: 1 tab(s) orally once a day (10 Sep 2019 01:09)  predniSONE 20 mg oral tablet: 1 tab(s) orally once a day (10 Sep 2019 01:09)      VITALS:   T(F): 96.9 (09-12 @ 05:03), Max: 97.4 (09-11 @ 05:29)  HR: 56 (09-12 @ 08:53) (55 - 78)  BP: 113/83 (09-12 @ 08:53) (113/83 - 176/84)  BP(mean): --  RR: 18 (09-12 @ 05:03) (18 - 18)  SpO2: 99% (09-12 @ 05:03) (96% - 99%)    I&O's Summary    11 Sep 2019 07:01  -  12 Sep 2019 07:00  --------------------------------------------------------  IN: 0 mL / OUT: 1000 mL / NET: -1000 mL        REVIEW OF SYSTEMS:  CONSTITUTIONAL: + generalized weakness No fevers or chills  EYES: No visual changes  ENT: No  throat pain   NECK: No pain or stiffness  RESPIRATORY: No cough, wheezing, hemoptysis; No shortness of breath  CARDIOVASCULAR: No chest pain or palpitations. + Leg swelling  GASTROINTESTINAL: No abdominal or epigastric pain. No nausea, vomiting, or hematemesis;  GENITOURINARY: + Urinary frequency   NEUROLOGICAL: No numbness or weakness  SKIN: No itching, no rashes      PHYSICAL EXAM:  NEURO: patient is awake , alert and oriented  GEN: Not in acute distress  NECK: no thyroid enlargement, no JVD  LUNGS: Clear to auscultation bilaterally   CARDIOVASCULAR: S1/S2 present, RRR , no murmurs or rubs, no carotid bruits,  + PP bilaterally 2-3+ edema bilaterally up to tibial plateau   ABD: Soft, non-tender, non-distended, +BS  SKIN: Intact. Bruising over lying right knee and dorsum of right foot. Ace wrap overlying BLE  MSK: good rom      LABS:                        12.5   14.16 )-----------( 264      ( 11 Sep 2019 07:30 )             38.8     09-11    136  |  106  |  75<HH>  ----------------------------<  142<H>  5.5<H>   |  15<L>  |  2.0<H>    Ca    8.9      11 Sep 2019 07:30  Mg     2.2     09-11    TPro  4.4<L>  /  Alb  1.9<L>  /  TBili  0.4  /  DBili  x   /  AST  30  /  ALT  45<H>  /  AlkPhos  219<H>  09-11    PTT - ( 12 Sep 2019 00:00 )  PTT:61.1 sec    Troponin T, Serum: 0.03:        RADIOLOGY:  -CXR 9/9/19: No radiographic evidence of acute cardiopulmonary disease.  -TTE 9/10/19:     1. Left ventricular ejection fraction, by visual estimation, is 45 to   50%.   2. Mildly decreased global left ventricular systolic function.   3. Mild-moderate tricuspid regurgitation.   4. PSAP at least 40.   5. Mild to moderate pulmonic valve regurgitation.   6. Estimated pulmonary artery systolic pressure is 46.5 mmHg assuming a   right atrial pressure of 8 mmHg, which is consistent with mild pulmonary   hypertension.    ECG:  EKG Sinus Bradycardia at 52. Second degree AVB Mobitz Type I. Periods of 2:1 block     TELEMETRY EVENTS:

## 2019-09-12 NOTE — PROGRESS NOTE ADULT - ASSESSMENT
ASSESSMENT  83yFemale with a PMH of Type 2 DM, Anemia, CKD 3, DLD, RA, Depression brought in for weakness, decreased ambulation, frequent falls, and increased urination     IMPRESSION  Cellulitis of LLE : with no signs of OM, fascitis, or abscess  Emphysematous Cystitis : unlikely given no pyuria, dysuria and clinically no evidence of cystitis.  Hematuria : resolving  Cholelithiasis: No evidence of Cholestasis in setting of dilated CBD . no acute cholecystitis/ cholangitis.  CBD significantly dilated  BCx 9/9 NG  UCx 9/10 E coli  WCx Morganella        RECOMMENDATIONS  Cefepime 1 gm iv q24h  Silverdene to bilateral LE BID

## 2019-09-12 NOTE — CONSULT NOTE ADULT - ASSESSMENT
83 year old female with PMH of DM II, anemia, CKD, DLD, presents for generalized weakness and frequent falls.    Assessment  Sinus Bradycardia    Plan 83 year old female with PMH of DM II, anemia, CKD, DLD, presents for generalized weakness and frequent falls.    Assessment  EKG demonstrating Sinus Bradycardia Mobitz Type I. Patient is hemodynamically stable.   AV conduction improves with increased height rate. Patient is not on AV node blocker.   Patient complaining of dizziness. Unknown if related to bradycardia. R/o severe conduction abnormality.     Plan  Correct potassium  Check TSH  EP evaluation 83 year old female with PMH of DM II, anemia, CKD, DLD, presents for generalized weakness and frequent falls.    Assessment  EKG demonstrating Sinus Bradycardia Mobitz Type I. Patient is hemodynamically stable.   AV conduction improves with increased height rate. Patient is not on AV node blocker.   Patient complaining of dizziness. Unknown if related to bradycardia. R/o severe conduction abnormality.     Plan  Correct potassium  Treat underlying risk factors  Check TSH  Place on telemetry  EP evaluation 83 year old female with PMH of DM II, anemia, CKD, DLD, presents for generalized weakness and frequent falls.    Assessment  EKG demonstrating Sinus Bradycardia  Mobitz Type I. Patient is hemodynamically stable.   AV conduction improves with increased height rate. Patient is not on AV node blocker.   Patient complaining of dizziness. Unknown if related to bradycardia. R/o severe conduction abnormality.     Plan  Avoid AV node blocking agents.   Correct potassium  Treat underlying risk factors  Check TSH  Place on telemetry  EP evaluation 83 year old female with PMH of DM II, anemia, CKD, DLD, presents for generalized weakness and frequent falls.    Assessment  EKG demonstrating Sinus Bradycardia  Mobitz Type I. Patient is hemodynamically stable.   AV conduction improves with increased heartrate. Patient is not on AV node blocker.   Patient complaining of dizziness. Unknown if related to bradycardia. R/o severe conduction abnormality.  pt has poor functional capacity  RCRI: 3    Plan:  treat underlying infection  Avoid AV node blocking agents.   Correct potassium  Check TSH  Place on telemetry  EP evaluation   from cardiac stand point , pt is at moderate risk for cardiovascular event , for a low risk procedure ( debridement), if pt becomes bradycardic and hemodynamically unstable in the OR , start dopamine. 83 year old female with PMH of DM II, anemia, CKD, DLD, presents for generalized weakness and frequent falls.    Assessment  EKG demonstrating Sinus Bradycardia  Mobitz Type I. Patient is hemodynamically stable.   AV conduction improves with increased heart rate. Patient is not on AV node blocker.   Patient complaining of dizziness. Unknown if related to bradycardia. R/o severe conduction abnormality.  pt has poor functional capacity  RCRI: 3    Plan:  treat underlying infection  Avoid AV node blocking agents.   Correct potassium  Check TSH  Place on telemetry  EP evaluation   from cardiac stand point , pt is at moderate risk for cardiovascular event , for a low risk procedure ( debridement), if pt becomes bradycardic and hemodynamically unstable in the OR , start dopamine.

## 2019-09-12 NOTE — DIETITIAN INITIAL EVALUATION ADULT. - NUTRITIONGOAL OUTCOME1
Pt to maintain PO intake >65% of meal trays within 3 days Pt to maintain PO intake >65% of meal trays within 4 days

## 2019-09-12 NOTE — DIETITIAN INITIAL EVALUATION ADULT. - FACTORS AFF FOOD INTAKE
Pt is somewhat poor historian. RD was able to obtain most hx from pt. Says she has a decrease in appetite at home, consumes 2 small meals daily. RD suspects pt unable to prepare food for self. Pt is eating nearly 100% of meals since admission. NKFA. Denies prior vit/min supplementation. Pt's dentures are at home- needs [facility] soft diet. Pt is noncompliant Diabetic, however, pt not fit for diet education as she is generally somewhat confused. Last BM 9/11.

## 2019-09-12 NOTE — DIETITIAN INITIAL EVALUATION ADULT. - PHYSICAL APPEARANCE
BMI: 25.7. IBW: 110#+/-10%. Pt says #, timeframe unknown. Wt loss indicated. RD observes moderate wasting to temples and clavicles. Edema 3+ b/l leg and 4+ b/l ankles noted. Venous stasis ulcers to b/l shins, stage II to b/l IT. Pt meets criteria for PCM- see bottom of note for recs- discussed with covering resident.

## 2019-09-12 NOTE — CONSULT NOTE ADULT - SUBJECTIVE AND OBJECTIVE BOX
Called today to evaluate an 82 y/o female s/p recent fall past Sunday,09/08/2019 c/o Right shoulder pain since the fall. Unable to localize the pain.  Reports feeling weak and dizzy at the time of the fall.  Also reports frequent falls, frequent urination, b/l LE pain and vascular issues. Denies head trauma, or other injuries.    PMHx: HTN, hyperlipidemia, DM, RA, hypothyroidism, CKD, anemia.  Allergies: NKDA    Vital Signs Last 24 Hrs  T(C): 36.1 (12 Sep 2019 05:03), Max: 36.2 (11 Sep 2019 20:07)  T(F): 96.9 (12 Sep 2019 05:03), Max: 97.1 (11 Sep 2019 20:07)  HR: 56 (12 Sep 2019 08:53) (56 - 67)  BP: 113/83 (12 Sep 2019 08:53) (113/83 - 176/74)  BP(mean): --  RR: 18 (12 Sep 2019 05:03) (18 - 18)  SpO2: 99% (12 Sep 2019 05:03) (98% - 99%)    GEN: WN/WD, A&O X 3, NAD    Right shoulder and UE: no visible deformities appreciated, skin intact, no ecchymosis, no local point tenderness, decreased ROM secondary to generalized pain, strength 3/5, no decreased sensation, pulses 2+, brisk cap. refills.                          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

## 2019-09-12 NOTE — PROGRESS NOTE ADULT - ASSESSMENT
83 year old female with PMH of DM II, anemia, CKD, DLD, RA, depression brought in by EMS from home for weakness, decreased ambulation, frequent falls and frequent urination. Admitted with multiple comorbidities.    # UTI , + UA with hematuria  - Sepsis ruled out on admission, WBC 20  - Frequent urination, UA positive, BALDEV  - CT A/P showed emphysematous cystitis  - Urology eval: no intervention, care discussed with urology- for new hematuria- watson irrigation, c/w heparin, hematuria could be 2/2 UTI and heparin.   - seen By ID- low suspicion for emphysematous cystitis, abx rocephin for now, as per ID hematuria could be due to underlying malignancy   -Ucx- E coli  - US kidney bladder- no hydro, bladder not visualised.     # Right common femoral DVT on heparin gtt  - Vascular consult appreciated  - Continue Heparin infusion for now, f/u PTT at 4PM till MRCP is done.   - Arterial duplex negative.     # BALDEV on CKD 3- improving.   - Cr 12/28/2018 was 1.3.   - Possibly prerenal - s/p 1 liter IV bolus  - Monitor Cr  - Avoid nephrotoxic agents  - c/w NS at 50 cc/hr.     # Common bile duct choledocholith  - Denies pain, and unremarkable physical exam  - Mild transaminitis  - Surgical eval: no surgical intervention  - US abdomen- +ve cholelithiasis, no cholecystitis.   - MRCP for CBD dilation- pending.   - GI eval based on MRCP    # Bilateral shins ulceration with erythema and discharge  - Left shin purulence culture noted.   - s/p Ancef, Vanco and Zosyn in ED  - ID on board- silverdene topical ointment to legs.   - Burn following- wound care-  soap and water qd, silvadene cream , ABD pad, kerlex and ace wrap bid and may need operative debridement.     # Rt shoulder pain  - X ray acromioclavicular joint separation of indeterminate age with widening of the AC joint interval measuring 1 cm.   - Ortho consult pending  - care discussed with ortho- Sling and outpt follow up.         # Weakness and frequent falls  - Likely due to multiple comorbidities  - Trauma workup in ED negative for any fractures  - Consult PT/rehab when pt's acuity improves    # DM II  - hjba1c 10.4, DC on insulin.   - c/w basal and bolus insulin regimen  - Monitor Fingersticks  - Carb consistent diet    # Rheumatoid arthritis  - On Prednisone    # DLD - Continue statin    # Elder self-neglect  - poor nutritional status- hypo-albunemia- RD eval.   - Unsafe living condition  - Social work assessment  - Needs placement    GI ppx: Protonix  DVT ppx/tx: on Heparin infusion    Dispo: from home, needs placement 83 year old female with PMH of DM II, anemia, CKD, DLD, RA, depression brought in by EMS from home for weakness, decreased ambulation, frequent falls and frequent urination. Admitted with multiple comorbidities.      # Asymptomatic Bradycardia  - Mobitz type 1/2 on EKG  -c/w telemetry monitoring  - check TSH, EP eval  - echo- EF 45-50, mild P HTN      # UTI , + UA with hematuria  - Sepsis ruled out on admission, WBC 20  - Frequent urination, UA positive, BALDEV  - CT A/P showed emphysematous cystitis  - Urology eval: no intervention, care discussed with urology- for new hematuria- watson irrigation, c/w heparin, hematuria could be 2/2 UTI and heparin.   - seen By ID- low suspicion for emphysematous cystitis, abx rocephin for now, as per ID hematuria could be due to underlying malignancy   -Ucx- E coli  - US kidney bladder- no hydro, bladder not visualised.     # Right common femoral DVT on heparin gtt  - Vascular consult appreciated  - Continue Heparin infusion for now, f/u PTT at 4PM till MRCP is done.   - Arterial duplex negative.     # BALDEV on CKD 3- improving.   - Cr 12/28/2018 was 1.3.   - Possibly prerenal - s/p 1 liter IV bolus  - Monitor Cr  - Avoid nephrotoxic agents  - c/w NS at 50 cc/hr.     # Common bile duct choledocholith  - Denies pain, and unremarkable physical exam  - Mild transaminitis  - Surgical eval: no surgical intervention  - US abdomen- +ve cholelithiasis, no cholecystitis.   - MRCP for CBD dilation- pending.   - GI eval based on MRCP    # Bilateral shins ulceration with erythema and discharge  - Left shin purulence culture noted.   - s/p Ancef, Vanco and Zosyn in ED  - ID on board- silverdene topical ointment to legs.   - Burn following- wound care-  soap and water qd, silvadene cream , ABD pad, kerlex and ace wrap bid and may need operative debridement.     # Rt shoulder pain  - X ray acromioclavicular joint separation of indeterminate age with widening of the AC joint interval measuring 1 cm.   - Ortho consult pending  - care discussed with ortho- Sling and outpt follow up.         # Weakness and frequent falls  - Likely due to multiple comorbidities  - Trauma workup in ED negative for any fractures  - Consult PT/rehab when pt's acuity improves    # DM II  - hjba1c 10.4, DC on insulin.   - c/w basal and bolus insulin regimen  - Monitor Fingersticks  - Carb consistent diet    # Rheumatoid arthritis  - On Prednisone    # DLD - Continue statin    # Elder self-neglect  - poor nutritional status- hypo-albunemia- RD eval.   - Unsafe living condition  - Social work assessment  - Needs placement    GI ppx: Protonix  DVT ppx/tx: on Heparin infusion    Dispo: from home, needs placement

## 2019-09-12 NOTE — DIETITIAN INITIAL EVALUATION ADULT. - MALNUTRITION
moderate PCM in context of social/environmental circumstances related to exact etiology unknown- likely d/t inability to care for self as evidenced by PO intake <75% of estimated needs x6+ months, moderate muscle depletion observed to temple and clavicle region.

## 2019-09-12 NOTE — PROGRESS NOTE ADULT - SUBJECTIVE AND OBJECTIVE BOX
SUBJECTIVE:    Patient is a 83y old Female who presents with a chief complaint of Emphysematous cystitis (11 Sep 2019 19:04)    Currently admitted to medicine with the primary diagnosis of Emphysematous cystitis     Today is hospital day 2d. Overnight, pt had episode of asymptomatic bradycardia. EKG Showed prolonged MN- 1st degree AV block.   Repeat ekg in AM showed- Mobitz type 1/2 Av block.   Pending MRCP. complains of pain the leg.       PAST MEDICAL & SURGICAL HISTORY  Depression  Chronic kidney disease (CKD)  Dyslipidemia  Hypertension  Diabetes mellitus    SOCIAL HISTORY:  Negative for smoking/alcohol/drug use.     ALLERGIES:  No Known Allergies    MEDICATIONS:  STANDING MEDICATIONS  atorvastatin 10 milliGRAM(s) Oral at bedtime  cefTRIAXone   IVPB 2000 milliGRAM(s) IV Intermittent every 24 hours  chlorhexidine 4% Liquid 1 Application(s) Topical <User Schedule>  dextrose 5%. 1000 milliLiter(s) IV Continuous <Continuous>  dextrose 50% Injectable 12.5 Gram(s) IV Push once  dextrose 50% Injectable 25 Gram(s) IV Push once  dextrose 50% Injectable 25 Gram(s) IV Push once  heparin  Infusion 900 Unit(s)/Hr IV Continuous <Continuous>  influenza   Vaccine 0.5 milliLiter(s) IntraMuscular once  insulin glargine Injectable (LANTUS) 15 Unit(s) SubCutaneous every morning  insulin lispro Injectable (HumaLOG) 5 Unit(s) SubCutaneous three times a day before meals  levothyroxine 125 MICROGram(s) Oral daily  lidocaine   Patch 1 Patch Transdermal daily  losartan 50 milliGRAM(s) Oral daily  pantoprazole    Tablet 40 milliGRAM(s) Oral before breakfast  predniSONE   Tablet 20 milliGRAM(s) Oral daily  silver sulfADIAZINE 1% Cream 1 Application(s) Topical every 12 hours  sodium chloride 0.9%. 1000 milliLiter(s) IV Continuous <Continuous>    PRN MEDICATIONS  dextrose 40% Gel 15 Gram(s) Oral once PRN  glucagon  Injectable 1 milliGRAM(s) IntraMuscular once PRN    VITALS:   T(F): 96.9  HR: 56  BP: 113/83  RR: 18  SpO2: 99%    LABS:                        12.5   14.16 )-----------( 264      ( 11 Sep 2019 07:30 )             38.8     09-11    136  |  106  |  75<HH>  ----------------------------<  142<H>  5.5<H>   |  15<L>  |  2.0<H>    Ca    8.9      11 Sep 2019 07:30  Mg     2.2     09-11    TPro  4.4<L>  /  Alb  1.9<L>  /  TBili  0.4  /  DBili  x   /  AST  30  /  ALT  45<H>  /  AlkPhos  219<H>  09-11    PTT - ( 12 Sep 2019 00:00 )  PTT:61.1 sec          Culture - Other (collected 10 Sep 2019 04:26)  Source: .Other Lower extremity drainage  Preliminary Report (12 Sep 2019 08:50):    Numerous Morganella morganii    Numerous Pseudomonas aeruginosa Susceptibility to follow.    Numerous Streptococcus agalactiae (Group B) isolated    Group B streptococci are susceptible to ampicillin,    penicillin and cefazolin, but may be resistant to    erythromycin and clindamycin.    Recommendations for intrapartum prophylaxis for Group B    streptococci are penicillin or ampicillin.  Organism: Morganella morganii (12 Sep 2019 08:46)  Organism: Morganella morganii (12 Sep 2019 08:46)    Culture - Urine (collected 10 Sep 2019 00:10)  Source: .Urine Clean Catch (Midstream)  Final Report (12 Sep 2019 09:03):    >100,000 CFU/ml Escherichia coli  Organism: Escherichia coli (12 Sep 2019 09:03)  Organism: Escherichia coli (12 Sep 2019 09:03)    Culture - Blood (collected 09 Sep 2019 20:25)  Source: .Blood Blood  Preliminary Report (11 Sep 2019 03:01):    No growth to date.    Culture - Blood (collected 09 Sep 2019 20:05)  Source: .Blood Blood  Preliminary Report (11 Sep 2019 03:01):    No growth to date.          RADIOLOGY:  < from: US Kidney and Bladder (09.11.19 @ 15:05) >  IMPRESSION:  Bilateral medical renal disease. No hydronephrosis.    < end of copied text >    < from: [facility] Duplex Lower Extrem Arterial, Bilat (09.10.19 @ 10:29) >  Impression:    No evidence of significant arterial occlusive disease in bilateral lower   extremities.    < end of copied text >      PHYSICAL EXAM:  GEN: comp[lains of [pain in legs and shoulder.   LUNGS: Clear to auscultation bilaterally   HEART: S1/S2 present. RRR.   ABD: Soft, non-tender, non-distended. Bowel sounds present  EXT: extyermities wrapped in dressing, complains of pain, tender.   NEURO: AAOX3

## 2019-09-12 NOTE — DIETITIAN INITIAL EVALUATION ADULT. - RD TO REMAIN AVAILABLE
RD to monitor diet order, energy intake, NFPF, body comp, glucose and renal profile. Pt at risk f/u 3 days/yes yes/RD to monitor diet order, energy intake, NFPF, body comp, glucose and renal profile. Pt at risk f/u 4 days

## 2019-09-13 NOTE — PROGRESS NOTE ADULT - SUBJECTIVE AND OBJECTIVE BOX
SUBJECTIVE:    Patient is a 83y old Female who presents with a chief complaint of Emphysematous cystitis (13 Sep 2019 08:56)    Currently admitted to medicine with the primary diagnosis of Emphysematous cystitis     Today is hospital day 3d. This morning she is resting comfortably in bed and reports no new issues or overnight events. Patient denied fevers, chills, SOB, and chest pain. Patient is only complaining of lower extremity pain.     Patient had a rapid response overnight charted in the profile. Patient had episode of bradycardia and lethargy and patient had adequate response to atropine. Patient was noted to have bradycardia when she sleeps and goes to 50s-60s when awake. Patient had a 5-second pause overnight and two episodes of 3-second pauses last night and this morning.    Patient also states she is not sure whether she will need to sell her house, but states she needs to get better first.    PAST MEDICAL & SURGICAL HISTORY  Depression  Chronic kidney disease (CKD)  Dyslipidemia  Hypertension  Diabetes mellitus      ALLERGIES:  No Known Allergies    MEDICATIONS:  STANDING MEDICATIONS  atorvastatin 10 milliGRAM(s) Oral at bedtime  cefepime   IVPB      cefepime   IVPB 1000 milliGRAM(s) IV Intermittent every 24 hours  chlorhexidine 4% Liquid 1 Application(s) Topical <User Schedule>  dextrose 5%. 1000 milliLiter(s) IV Continuous <Continuous>  dextrose 50% Injectable 12.5 Gram(s) IV Push once  dextrose 50% Injectable 25 Gram(s) IV Push once  dextrose 50% Injectable 25 Gram(s) IV Push once  DOPamine Infusion 2.5 MICROgram(s)/kG/Min IV Continuous <Continuous>  heparin  Infusion 700 Unit(s)/Hr IV Continuous <Continuous>  influenza   Vaccine 0.5 milliLiter(s) IntraMuscular once  insulin glargine Injectable (LANTUS) 15 Unit(s) SubCutaneous every morning  insulin lispro Injectable (HumaLOG) 5 Unit(s) SubCutaneous three times a day before meals  levothyroxine 125 MICROGram(s) Oral daily  lidocaine   Patch 1 Patch Transdermal daily  losartan 50 milliGRAM(s) Oral daily  pantoprazole    Tablet 40 milliGRAM(s) Oral before breakfast  predniSONE   Tablet 20 milliGRAM(s) Oral daily  silver sulfADIAZINE 1% Cream 1 Application(s) Topical every 12 hours  sodium chloride 0.9%. 1000 milliLiter(s) IV Continuous <Continuous>    PRN MEDICATIONS  dextrose 40% Gel 15 Gram(s) Oral once PRN  glucagon  Injectable 1 milliGRAM(s) IntraMuscular once PRN    VITALS:   T(F): 97  HR: 66  BP: 144/72  RR: 16  SpO2: 97%    LABS:                        10.8   11.34 )-----------( 280      ( 13 Sep 2019 06:09 )             34.4     09-13    138  |  110  |  57<H>  ----------------------------<  196<H>  4.9   |  19  |  1.8<H>    Ca    8.4<L>      13 Sep 2019 06:09  Phos  3.5     09-13  Mg     2.0     09-13    TPro  3.7<L>  /  Alb  1.8<L>  /  TBili  <0.2  /  DBili  x   /  AST  21  /  ALT  40  /  AlkPhos  185<H>  09-13    PTT - ( 13 Sep 2019 06:09 )  PTT:29.1 sec    ABG - ( 12 Sep 2019 16:48 )  pH, Arterial: 7.37  pH, Blood: x     /  pCO2: 31    /  pO2: 95    / HCO3: 18    / Base Excess: -6.3  /  SaO2: 98                Troponin T, Serum: 0.03 ng/mL <HH> (09-13-19 @ 06:09)  Lactate, Blood: 2.2 mmol/L (09-13-19 @ 04:00)  Troponin T, Serum: 0.04 ng/mL <HH> (09-13-19 @ 04:00)      Culture - Abscess with Gram Stain (collected 11 Sep 2019 16:10)  Source: .Abscess Leg - Right  Preliminary Report (12 Sep 2019 23:03):    Few Streptococcus agalactiae (Group B) isolated    Group B streptococci are susceptible to ampicillin,    penicillin and cefazolin, but may be resistant to    erythromycin and clindamycin.    Recommendations for intrapartum prophylaxis for Group B    streptococci are penicillin or ampicillin.      CARDIAC MARKERS ( 13 Sep 2019 06:09 )  x     / 0.03 ng/mL / x     / x     / x      CARDIAC MARKERS ( 13 Sep 2019 04:00 )  x     / 0.04 ng/mL / x     / x     / x          RADIOLOGY:  < from: CT Abdomen and Pelvis No Cont (09.09.19 @ 21:51) >  IMPRESSION:    Emphysematous cystitis.     Cholelithiasis and 1.6 cm distal CBD choledocholith.    < end of copied text >      < from: US Kidney and Bladder (09.11.19 @ 15:05) >  IMPRESSION:  Bilateral medical renal disease. No hydronephrosis.    < end of copied text >      < from: [facility] Duplex Lower Ext Vein Scan, Bilat (09.09.19 @ 22:01) >  Impression:    Deep venous thrombosis right common femoral vein.    No evidence of deep venous thrombosis or superficial thrombophlebitis in   left lower extremity    < end of copied text >    PHYSICAL EXAM:  GEN: No acute distress. Lying comfortably on the bed.  PULM/CHEST: Clear to auscultation bilaterally, no rales, rhonchi or wheezes; limited to anterior auscultation of chest due to patient position  CVS: Bradycardic, S1-S2, no murmurs  ABD: Soft, non-tender, non-distended, normoactive BS  EXT: Patient has LE cellulitic wounds wrapped in dressing and ACE wraps  NEURO: AAOx3. Depressed affect    Morris Catheter:   Indwelling Urethral Catheter:     Connect To:  Straight Drainage/Gravity    Indication:  Urine Output Monitoring in Critically Ill (09-11-19 @ 04:33) (not performed) [active]

## 2019-09-13 NOTE — PROGRESS NOTE ADULT - SUBJECTIVE AND OBJECTIVE BOX
ANGEL FREDI  83y, Female      OVERNIGHT EVENTS:    no fevers, low BP, no abdominal pain  watson in place    VITALS:  T(F): 97, Max: 97.2 (09-12-19 @ 20:32)  HR: 66  BP: 144/72  RR: 16Vital Signs Last 24 Hrs  T(C): 36.1 (13 Sep 2019 05:07), Max: 36.2 (12 Sep 2019 20:32)  T(F): 97 (13 Sep 2019 05:07), Max: 97.2 (12 Sep 2019 20:32)  HR: 66 (13 Sep 2019 10:43) (28 - 73)  BP: 144/72 (13 Sep 2019 10:43) (144/72 - 196/77)  BP(mean): --  RR: 16 (13 Sep 2019 10:43) (12 - 18)  SpO2: 97% (13 Sep 2019 07:57) (97% - 98%)    TESTS & MEASUREMENTS:                        10.8   11.34 )-----------( 280      ( 13 Sep 2019 06:09 )             34.4     09-13    138  |  110  |  57<H>  ----------------------------<  196<H>  4.9   |  19  |  1.8<H>    Ca    8.4<L>      13 Sep 2019 06:09  Phos  3.5     09-13  Mg     2.0     09-13    TPro  3.7<L>  /  Alb  1.8<L>  /  TBili  <0.2  /  DBili  x   /  AST  21  /  ALT  40  /  AlkPhos  185<H>  09-13    LIVER FUNCTIONS - ( 13 Sep 2019 06:09 )  Alb: 1.8 g/dL / Pro: 3.7 g/dL / ALK PHOS: 185 U/L / ALT: 40 U/L / AST: 21 U/L / GGT: x             Culture - Abscess with Gram Stain (collected 09-11-19 @ 16:10)  Source: .Abscess Leg - Right  Preliminary Report (09-12-19 @ 23:03):    Few Streptococcus agalactiae (Group B) isolated    Group B streptococci are susceptible to ampicillin,    penicillin and cefazolin, but may be resistant to    erythromycin and clindamycin.    Recommendations for intrapartum prophylaxis for Group B    streptococci are penicillin or ampicillin.    Culture - Other (collected 09-10-19 @ 04:26)  Source: .Other Lower extremity drainage  Final Report (09-13-19 @ 10:31):    Numerous Morganella morganii    Numerous Pseudomonas aeruginosa    Numerous Streptococcus agalactiae (Group B) isolated    Group B streptococci are susceptible to ampicillin,    penicillin and cefazolin, but may be resistant to    erythromycin and clindamycin.    Recommendations for intrapartum prophylaxis for Group B    streptococci are penicillin or ampicillin.  Organism: Morganella morganii  Pseudomonas aeruginosa (09-13-19 @ 10:31)  Organism: Pseudomonas aeruginosa (09-13-19 @ 10:31)      -  Amikacin: S <=16      -  Aztreonam: S <=4      -  Cefepime: S <=2      -  Ceftazidime: S <=1      -  Ciprofloxacin: S <=1      -  Gentamicin: S <=2      -  Imipenem: S <=1      -  Levofloxacin: S <=2      -  Meropenem: S <=1      -  Piperacillin/Tazobactam: S <=8      -  Tobramycin: S <=2      Method Type: AIRAM  Organism: Mora coyneii (09-13-19 @ 10:31)      -  Amikacin: S <=16      -  Amoxicillin/Clavulanic Acid: R >16/8      -  Ampicillin: R >16 These ampicillin results predict results for amoxicillin      -  Ampicillin/Sulbactam: R >16/8 Enterobacter, Citrobacter, and Serratia may develop resistance during prolonged therapy (3-4 days)      -  Aztreonam: R >16      -  Cefazolin: R >16 Enterobacter, Citrobacter, and Serratia may develop resistance during prolonged therapy (3-4 days)      -  Cefepime: S <=2      -  Cefoxitin: R >16      -  Ceftazidime/Avibactam: S <=4      -  Ceftolozane/tazobactam: R 8      -  Ceftriaxone: R 8 Enterobacter, Citrobacter, and Serratia may develop resistance during prolonged therapy      -  Ciprofloxacin: S <=1      -  Ertapenem: I 1      -  Gentamicin: S 4      -  Imipenem: I 2      -  Levofloxacin: S <=2      -  Meropenem: S <=1      -  Piperacillin/Tazobactam: S <=8      -  Tobramycin: I 8      -  Trimethoprim/Sulfamethoxazole: R >2/38      Method Type: AIRAM    Culture - Urine (collected 09-10-19 @ 00:10)  Source: .Urine Clean Catch (Midstream)  Final Report (09-12-19 @ 09:03):    >100,000 CFU/ml Escherichia coli  Organism: Escherichia coli (09-12-19 @ 09:03)  Organism: Escherichia coli (09-12-19 @ 09:03)      -  Amikacin: S <=16      -  Ampicillin: S <=8 These ampicillin results predict results for amoxicillin      -  Ampicillin/Sulbactam: S <=8/4 Enterobacter, Citrobacter, and Serratia may develop resistance during prolonged therapy (3-4 days)      -  Aztreonam: S <=4      -  Cefazolin: S <=8 (MIC_CL_COM_ENTERIC_[id number]) For uncomplicated UTI with K. pneumoniae, E. coli, or P. mirablis: AIRAM <=16 is sensitive and AIRAM >=32 is resistant. This also predicts results for oral agents cefaclor, cefdinir, cefpodoxime, cefprozil, cefuroxime axetil, cephalexin and locarbef for uncomplicated UTI. Note that some isolates may be susceptible to these agents while testing resistant to cefazolin.      -  Cefepime: S <=4      -  Cefoxitin: S <=8      -  Ceftriaxone: S <=1 Enterobacter, Citrobacter, and Serratia may develop resistance during prolonged therapy      -  Ciprofloxacin: S <=1      -  Gentamicin: S <=4      -  Imipenem: S <=1      -  Levofloxacin: S <=2      -  Meropenem: S <=1      -  Nitrofurantoin: S <=32 Should not be used to treat pyelonephritis      -  Piperacillin/Tazobactam: S <=16      -  Tigecycline: S <=2      -  Tobramycin: S <=4      -  Trimethoprim/Sulfamethoxazole: S <=2/38      Method Type: AIRAM    Culture - Blood (collected 09-09-19 @ 20:25)  Source: .Blood Blood  Preliminary Report (09-11-19 @ 03:01):    No growth to date.    Culture - Blood (collected 09-09-19 @ 20:05)  Source: .Blood Blood  Preliminary Report (09-11-19 @ 03:01):    No growth to date.            RADIOLOGY & ADDITIONAL TESTS:    ANTIBIOTICS:  cefepime   IVPB      cefepime   IVPB 1000 milliGRAM(s) IV Intermittent every 24 hours

## 2019-09-13 NOTE — PROGRESS NOTE ADULT - ASSESSMENT
83 year old female with PMH of DM II, anemia, CKD, DLD, RA, depression brought in by EMS from home for weakness, decreased ambulation, frequent falls and frequent urination.   Found to have 2nd degree heart block Mobitz type 1    Plan:  HR responded to Dopamine infusion (predominantly in NSR now) , cont Dopamine infusion at 2.5 mcg/ kg/ min for now and monitor on tele  HR going up to 70-90's when pt is active (talking, eating )  Discussed with Burn team. Debridement rescheduled on Monday. Will increase Dopamine to 5 mcg/kg/min on Monday before the procedure.   check TSH  will follow

## 2019-09-13 NOTE — CONSULT NOTE ADULT - ASSESSMENT
82 yo F with a known Hx of cholelithiasis now with evidence of non obstructing choledocholithiasis on imaging.  Given the fact that there is no evidence of cholangitis, or obstructive  symptoms (RUQ pain, jaundice) would defer intervention in an elderly lady with an active infection and cardiac comorbidity.    An ERCP would be considered VS IR biliary drainage on emergent basis should the above develop. 84 yo F with a known Hx of cholelithiasis now with evidence of non obstructing choledocholithiasis on imaging.  1)high likelihood choledocholithiasis (no obstructing)  2)Emphysematous cystitis  3)profound bradycardia    Rec:  - Given the fact that there is no evidence of cholangitis, or obstructive  symptoms (RUQ pain, jaundice) would defer intervention in an elderly lady with an active infection and cardiac comorbidity.  - An ERCP would be considered VS IR biliary drainage on emergent basis should the above develop.

## 2019-09-13 NOTE — CHART NOTE - NSCHARTNOTEFT_GEN_A_CORE
Rapid response called on patient due to bradycardia in the 20's and increased lethargy compared to earlier in the evening. Patient was afebrile (97.2) without tachypnea and blood pressure remained elevated (162/75). Patient was alert and oriented, but required more direct and loud questioning to elicit a response. She was able to correctly identify her present location, the current year, and the current President. Patient stated she felt "funny" but could not elaborate. Atropine (0.5) was administered and STAT labs were drawn (CBC, CMP, Mag, Phos, Lactate, Troponin, Blood cultures). Heart rate improved to 40's-60's. Cardiac fellow stated this is patient's baseline, and aside from her persistent bradycardia, all other vital signs are either normal/elevated. Fellow did not recommend upgrade. EP has seen patient and is recommending PPM, but only after current infections are cleared (UA positive) and patient is medically optimized. Will continue to monitor. Rapid response called on patient due to bradycardia in the 20's and increased lethargy compared to earlier in the evening. Patient was afebrile (97.2) without tachypnea and blood pressure remained elevated (162/75). Patient was alert and oriented, but required more direct and loud questioning to elicit a response compared to before. She was able to correctly identify her present location, the current year, and the current President. Patient stated she felt "funny" but could not elaborate. Atropine (0.5) was administered and STAT labs were drawn (CBC, CMP, Mag, Phos, Lactate, Troponin, Blood cultures). Heart rate improved to 40's-60's. Cardiac fellow stated this is patient's baseline, and aside from her persistent bradycardia, all other vital signs are either normal/elevated. Fellow did not recommend upgrade. EP has seen patient and is recommending PPM, but only after current infections are cleared (UA positive) and patient is medically optimized. In my overall experience with this patient overnight, her heart rate will fluctuate between 30's-60's. When she falls asleep, heart rate will generally stay in 30's-40's, and when she is awoken, her heart rate will range from 40's-60's. She has some baseline slurred speech and lethargy, but she generally has good arousability and will converse with staff. Her main complaint overnight has been pain in the lower extremities secondary to ulcerations. Debridement supposed to take place today. Will continue to monitor.

## 2019-09-13 NOTE — PROGRESS NOTE ADULT - ASSESSMENT
ssessment and Plan:   · Assessment		  ASSESSMENT  83yFemale with a PMH of Type 2 DM, Anemia, CKD 3, DLD, RA, Depression brought in for weakness, decreased ambulation, frequent falls, and increased urination     IMPRESSION  Cellulitis of LLE : with no signs of OM, fascitis, or abscess  Emphysematous Cystitis : unlikely given no pyuria, dysuria and clinically no evidence of cystitis.  Hematuria : resolving  Cholelithiasis: No evidence of Cholestasis in setting of dilated CBD . no acute cholecystitis/ cholangitis.  CBD significantly dilated  BCx 9/9 NG  UCx 9/10 E coli  WCx Morganella    RECOMMENDATIONS  Cefepime 1 gm iv q24h  Could change to po Cipro 250 mg q12h for 10 more days on discharge  Silverdene to bilateral LE BID   recall prn please

## 2019-09-13 NOTE — PROGRESS NOTE ADULT - ASSESSMENT
83 year old female with PMH of DM II, anemia, CKD, DLD, RA, depression brought in by EMS from home for weakness, decreased ambulation, frequent falls and frequent urination. Admitted with multiple comorbidities.    # Bradycardia  - Mobitz type 1/2 on EKG  - c/w telemetry monitoring  - check TSH  - echo- EF 45-50, mild P HTN  - Episode of 5s pause and two episodes of 3s pause  - Will start on dopamine drip - if patient does not respond, likely will need to be upgraded to the unit w/ transvenous pacer  - Will likely need PPM but needs to be medically cleared for UTI and cellulitis  - EP following    # UTI , Positive UA with hematuria and increased urinary frequency  - Sepsis ruled out on admission, WBC 20  - Leukocytosis trending down  - CT A/P showed emphysematous cystitis  - Urology eval: no intervention, care discussed with urology- for new hematuria- watson irrigation, c/w heparin, hematuria could be 2/2 UTI and heparin.   - ID Recs appreciated: cellulitis LLE; no evidence of cholestasis in setting of dilated CBD; no acute cholecystitis or cholangitis; urine cx 9/09 NGTD; urine cx 9/10 showed E coli; wound cx showed Morganella  - Low suspicion for emphysematous cystitis, Cefepime 1g IV q24h for now  - US kidney bladder - no hydro, bladder not visualised    # Right common femoral DVT on heparin gtt  - Vascular consult appreciated - heparin in hospital then Eliquis on discharge; f/u w/in 2 mos w/ vascular  - Arterial duplex negative  - Continue Heparin infusion for now; 60-80 goal  - AM PTT was subtherapeutic, increasing heparin to 8ml/hr; f/u PTT 4pm    # BALDEV on CKD 3- improving.   - Cr 12/28/2018 was 1.3.   - Possibly prerenal - s/p 1 liter IV bolus  - Monitor Cr  - Avoid nephrotoxic agents  - c/w NS at 50 cc/hr  - Currently 1.8, continue to monitor    # Common bile duct choledocholith  - Denies pain, and unremarkable physical exam  - Mild transaminitis  - Surgical eval: no surgical intervention  - US abdomen- +ve cholelithiasis, no cholecystitis.   - GI recs appreciated: Given no evidence of cholangitis or obstructive sx, would defer intervention in active infection and cardiac comorbidity - ERCP to be considered vs IR biliary drainage should the prior conditions develop    # Bilateral shins ulceration with erythema and discharge  - Left shin purulence culture noted.   - s/p Ancef, Vanco and Zosyn in ED  - ID on board- silverdene topical ointment to legs.   - Burn following- wound care-  soap and water qd, silvadene cream , ABD pad, kerlex and ace wrap bid - operative debridement under local anesthesia as an add-on today or possibly Monday    # Rt shoulder pain  - X ray acromioclavicular joint separation of indeterminate age with widening of the AC joint interval measuring 1 cm.   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   - c/w basal and bolus insulin regimen  - Monitor Fingersticks  - Carb consistent diet    # Rheumatoid arthritis  - On Prednisone 20mg qd    # DLD - Continue statin    # Elder self-neglect  - poor nutritional status- hypo-albunemia- RD eval.   - Unsafe living condition  - Social work assessment  - Needs placement    GI ppx: Protonix  DVT ppx/tx: on Heparin infusion    Dispo: from home, needs placement 83 year old female with PMH of DM II, anemia, CKD, DLD, RA, depression brought in by EMS from home for weakness, decreased ambulation, frequent falls and frequent urination. Admitted with multiple comorbidities.    # Bradycardia  - Mobitz type 1/2 on EKG  - c/w telemetry monitoring  - check TSH  - echo- EF 45-50, mild P HTN  - Episode of 5s pause and two episodes of 3s pause  - Will start on dopamine drip - if patient does not respond, likely will need to be upgraded to the unit w/ transvenous pacer  - Will likely need PPM but needs to be medically cleared for UTI and cellulitis  - EP following    # UTI , Positive UA with hematuria and increased urinary frequency  - Sepsis ruled out on admission, WBC 20  - Leukocytosis trending down  - CT A/P showed emphysematous cystitis  - Urology eval: no intervention, care discussed with urology- for new hematuria- watson irrigation, c/w heparin, hematuria could be 2/2 UTI and heparin.   - ID Recs appreciated: cellulitis LLE; no evidence of cholestasis in setting of dilated CBD; no acute cholecystitis or cholangitis; urine cx 9/09 NGTD; urine cx 9/10 showed E coli; wound cx showed Morganella  - Low suspicion for emphysematous cystitis, Cefepime 1g IV q24h for now; could change to PO Cipro 250mg q12h for 10 more days on discharge  - US kidney bladder - no hydro, bladder not visualized    # Right common femoral DVT on heparin gtt  - Vascular consult appreciated - heparin in hospital then Eliquis on discharge; f/u w/in 2 mos w/ vascular  - Arterial duplex negative  - Continue Heparin infusion for now; 60-80 goal  - AM Heparin was discontinued for possible debridement today which was cancelled; c/w heparin to 7ml/hr; f/u PTT 4pm    # BALDEV on CKD 3- improving.   - Cr 12/28/2018 was 1.3.   - Possibly prerenal - s/p 1 liter IV bolus  - Monitor Cr  - Avoid nephrotoxic agents  - c/w NS at 50 cc/hr  - Currently 1.8, continue to monitor    # Common bile duct choledocholith  - Denies pain, and unremarkable physical exam  - Mild transaminitis  - Surgical eval: no surgical intervention  - US abdomen- +ve cholelithiasis, no cholecystitis.   - GI recs appreciated: Given no evidence of cholangitis or obstructive sx, would defer intervention in active infection and cardiac comorbidity - ERCP to be considered vs IR biliary drainage should the prior conditions develop    # Bilateral shins ulceration with erythema and discharge  - Left shin purulence culture noted.   - s/p Ancef, Vanco and Zosyn in ED  - ID on board- silverdene topical ointment to legs.   - Burn following- wound care-  soap and water qd, silvadene cream , ABD pad, kerlex and ace wrap bid - operative debridement under local anesthesia on Monday    # Rt shoulder pain  - X ray acromioclavicular joint separation of indeterminate age with widening of the AC joint interval measuring 1 cm.   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   - c/w basal and bolus insulin regimen  - Monitor Fingersticks  - Carb consistent diet    # Rheumatoid arthritis  - On Prednisone 20mg qd    # DLD - Continue statin    # Elder self-neglect  - poor nutritional status- hypo-albunemia- RD eval.   - Unsafe living condition  - Social work assessment  - Needs placement    GI ppx: Protonix  DVT ppx/tx: on Heparin infusion    Dispo: from home, needs placement 83 year old female with PMH of DM II, anemia, CKD, DLD, RA, depression brought in by EMS from home for weakness, decreased ambulation, frequent falls and frequent urination. Admitted with multiple comorbidities.    # Bradycardia  - Mobitz type 1/2 on EKG  - c/w telemetry monitoring  - check TSH  - echo- EF 45-50, mild P HTN  - Episode of 5s pause and two episodes of 3s pause  - Will start on dopamine drip (2.5mcg/kg/min) - if patient does not respond, likely will need to be upgraded to the unit w/ transvenous pacer  - Will likely need PPM but needs to be medically cleared for UTI and cellulitis  - EP following    # UTI , Positive UA with hematuria and increased urinary frequency  - Sepsis ruled out on admission, WBC 20  - Leukocytosis trending down  - CT A/P showed emphysematous cystitis  - Urology eval: no intervention, care discussed with urology- for new hematuria- watson irrigation, c/w heparin, hematuria could be 2/2 UTI and heparin.   - ID Recs appreciated: cellulitis LLE; no evidence of cholestasis in setting of dilated CBD; no acute cholecystitis or cholangitis; urine cx 9/09 NGTD; urine cx 9/10 showed E coli; wound cx showed Morganella  - Low suspicion for emphysematous cystitis, Cefepime 1g IV q24h for now; could change to PO Cipro 250mg q12h for 10 more days on discharge  - US kidney bladder - no hydro, bladder not visualized    # Right common femoral DVT on heparin gtt  - Vascular consult appreciated - heparin in hospital then Eliquis on discharge; f/u w/in 2 mos w/ vascular  - Arterial duplex negative  - Continue Heparin infusion for now; 60-80 goal  - AM Heparin was discontinued for possible debridement today which was cancelled; c/w heparin to 7ml/hr; f/u PTT 4pm    # BALDEV on CKD 3- improving.   - Cr 12/28/2018 was 1.3.   - Possibly prerenal - s/p 1 liter IV bolus  - Monitor Cr  - Avoid nephrotoxic agents  - c/w NS at 50 cc/hr  - Currently 1.8, continue to monitor    # Common bile duct choledocholith  - Denies pain, and unremarkable physical exam  - Mild transaminitis  - Surgical eval: no surgical intervention  - US abdomen- +ve cholelithiasis, no cholecystitis.   - GI recs appreciated: Given no evidence of cholangitis or obstructive sx, would defer intervention in active infection and cardiac comorbidity - ERCP to be considered vs IR biliary drainage should the prior conditions develop    # Bilateral shins ulceration with erythema and discharge  - Left shin purulence culture noted.   - s/p Ancef, Vanco and Zosyn in ED  - ID on board- silverdene topical ointment to legs.   - Burn following- wound care-  soap and water qd, silvadene cream , ABD pad, kerlex and ace wrap bid - operative debridement under local anesthesia on Monday  - Will increase dopamine to 5mcg/kg/min on Monday preop    # Rt shoulder pain  - X ray acromioclavicular joint separation of indeterminate age with widening of the AC joint interval measuring 1 cm.   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   - c/w basal and bolus insulin regimen  - Monitor Fingersticks  - Carb consistent diet    # Rheumatoid arthritis  - On Prednisone 20mg qd    # DLD - Continue statin    # Elder self-neglect  - poor nutritional status- hypo-albunemia- RD eval.   - Unsafe living condition  - Social work assessment  - Needs placement    GI ppx: Protonix  DVT ppx/tx: on Heparin infusion    Dispo: from home, needs placement

## 2019-09-13 NOTE — PROGRESS NOTE ADULT - SUBJECTIVE AND OBJECTIVE BOX
FREDI ORTIZ  83y  Female    Allergy: No Known Allergies      Patient is a 83y old  Female who presents with a chief complaint of Emphysematous cystitis (13 Sep 2019 11:20)      INTERVAL HPI/OVERNIGHT EVENTS:  seen and examined pt at bedside. rapid response was called for bradycardia last night. pt is currently asymptomatic.     REVIEW OF SYSTEMS:  All other review of systems negative    PAST MEDICAL & SURGICAL HISTORY:  Depression  Chronic kidney disease (CKD)  Dyslipidemia  Hypertension  Diabetes mellitus      Vital Signs Last 24 Hrs  T(C): 36.1 (13 Sep 2019 05:07), Max: 36.2 (12 Sep 2019 20:32)  T(F): 97 (13 Sep 2019 05:07), Max: 97.2 (12 Sep 2019 20:32)  HR: 66 (13 Sep 2019 14:00) (28 - 73)  BP: 142/64 (13 Sep 2019 14:00) (135/62 - 196/77)  BP(mean): --  RR: 16 (13 Sep 2019 10:43) (12 - 18)  SpO2: 97% (13 Sep 2019 07:57) (97% - 98%)    I&O's Summary    12 Sep 2019 07:01  -  13 Sep 2019 07:00  --------------------------------------------------------  IN: 599 mL / OUT: 3300 mL / NET: -2701 mL    13 Sep 2019 07:01  -  13 Sep 2019 15:15  --------------------------------------------------------  IN: 34 mL / OUT: 300 mL / NET: -266 mL        Home Medications:  atorvastatin 10 mg oral tablet: 1 tab(s) orally once a day (10 Sep 2019 01:09)  diclofenac 1% topical gel:  (10 Sep 2019 01:10)  levothyroxine 125 mcg (0.125 mg) oral tablet: 1 tab(s) orally once a day (10 Sep 2019 01:09)  losartan 50 mg oral tablet: 1 tab(s) orally once a day (10 Sep 2019 01:09)  predniSONE 20 mg oral tablet: 1 tab(s) orally once a day (10 Sep 2019 01:09)      PHYSICAL EXAM:  GENERAL: NAD  HEENT: no pallor/icterus  NECK: supple  NERVOUS SYSTEM:  Alert & Oriented X3  PULMONARY: CTA b/l ; No rales, rhonchi, wheezing, or rubs  CARDIOVASCULAR: Regular rate and rhythm; No murmurs, rubs, or gallops  GI: Soft, Nontender, Nondistended; Bowel sounds present  EXTREMITIES:  + LE edema. +dressing       LABS                        10.8   11.34 )-----------( 280      ( 13 Sep 2019 06:09 )             34.4     09-13    138  |  110  |  57<H>  ----------------------------<  196<H>  4.9   |  19  |  1.8<H>    Ca    8.4<L>      13 Sep 2019 06:09  Phos  3.5     09-13  Mg     2.0     09-13    TPro  3.7<L>  /  Alb  1.8<L>  /  TBili  <0.2  /  DBili  x   /  AST  21  /  ALT  40  /  AlkPhos  185<H>  09-13    CARDIAC MARKERS ( 13 Sep 2019 06:09 )  x     / 0.03 ng/mL / x     / x     / x      CARDIAC MARKERS ( 13 Sep 2019 04:00 )  x     / 0.04 ng/mL / x     / x     / x            Culture - Abscess with Gram Stain (collected 11 Sep 2019 16:10)  Source: .Abscess Leg - Right  Preliminary Report (12 Sep 2019 23:03):    Few Streptococcus agalactiae (Group B) isolated    Group B streptococci are susceptible to ampicillin,    penicillin and cefazolin, but may be resistant to    erythromycin and clindamycin.    Recommendations for intrapartum prophylaxis for Group B    streptococci are penicillin or ampicillin.      PTT - ( 13 Sep 2019 06:09 )  PTT:29.1 sec    RADIOLOGY & ADDITIONAL TESTS:      MEDICATIONS  (STANDING):  atorvastatin 10 milliGRAM(s) Oral at bedtime  cefepime   IVPB      cefepime   IVPB 1000 milliGRAM(s) IV Intermittent every 24 hours  chlorhexidine 4% Liquid 1 Application(s) Topical <User Schedule>  dextrose 5%. 1000 milliLiter(s) (50 mL/Hr) IV Continuous <Continuous>  dextrose 50% Injectable 12.5 Gram(s) IV Push once  dextrose 50% Injectable 25 Gram(s) IV Push once  dextrose 50% Injectable 25 Gram(s) IV Push once  DOPamine Infusion 2.5 MICROgram(s)/kG/Min (6 mL/Hr) IV Continuous <Continuous>  heparin  Infusion 700 Unit(s)/Hr (7 mL/Hr) IV Continuous <Continuous>  influenza   Vaccine 0.5 milliLiter(s) IntraMuscular once  insulin glargine Injectable (LANTUS) 15 Unit(s) SubCutaneous every morning  insulin lispro Injectable (HumaLOG) 5 Unit(s) SubCutaneous three times a day before meals  levothyroxine 125 MICROGram(s) Oral daily  lidocaine   Patch 1 Patch Transdermal daily  losartan 50 milliGRAM(s) Oral daily  pantoprazole    Tablet 40 milliGRAM(s) Oral before breakfast  predniSONE   Tablet 20 milliGRAM(s) Oral daily  silver sulfADIAZINE 1% Cream 1 Application(s) Topical every 12 hours  sodium chloride 0.9%. 1000 milliLiter(s) (50 mL/Hr) IV Continuous <Continuous>    MEDICATIONS  (PRN):  dextrose 40% Gel 15 Gram(s) Oral once PRN Blood Glucose LESS THAN 70 milliGRAM(s)/deciliter  glucagon  Injectable 1 milliGRAM(s) IntraMuscular once PRN Glucose LESS THAN 70 milligrams/deciliter      < from: 12 Lead ECG (09.12.19 @ 18:31) >    Diagnosis Line Sinus rhythm with 2nd degree A-V block (Mobitz I) with ventricular escape  complexes  T wave abnormality, consider anterior ischemia  Abnormal ECG    < end of copied text >    < from: Transthoracic Echocardiogram (09.10.19 @ 12:16) >    Summary:   1. Left ventricular ejection fraction, by visual estimation, is 45 to   50%.   2. Mildly decreased global left ventricular systolic function.   3. Mild-moderate tricuspid regurgitation.   4. PSAP at least 40.   5. Mild to moderate pulmonic valve regurgitation.   6. Estimated pulmonary artery systolic pressure is 46.5 mmHg assuming a   right atrial pressure of 8 mmHg, which is consistent with mild pulmonary   hypertension.    < end of copied text >      Tele-  currently in SR with HR going upto 70 on low dose dopamine drip.
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
## 2019-09-13 NOTE — CONSULT NOTE ADULT - SUBJECTIVE AND OBJECTIVE BOX
Gastroenterology/Hepatology Consultation    For questions and inquiries please page [phone].  For urgent matters or after 5pm and on weekends please page the fellow on call through the GI paging system.    83y Female with choledocholithiasis  Patient is a 83y old  Female who presents with a chief complaint of Emphysematous cystitis (12 Sep 2019 14:54)    HPI:  83 year old female with PMH of DM II, anemia, CKD, DLD, RA, depression brought in by EMS from home for weakness, decreased ambulation, frequent falls and frequent urination. Patient reports that she had increased falls since her left leg is weak and she fell today twice from the chair. Patient is a poor historian. As per ED documentation, unsure if she had head trauma. Patient's neighbor arrived in ED during workup and states that patient is living in a disheveled home with a son who has developmental delay, described them as "hoarders" and since pt's   about 2 years ago, they are not taken care. Pt also complains of increased urinary frequency and constipation.     During workup in ED, code STEMI was called for EKG showing snus bradycardia with 1st degree AV block and ST elevation in I and aVL. Code STEMI was cancelled as pt was asymptomatic and hemodynamically stable.    Trauma workup in ED was negative for any fractures, however several findings were revealed during workup: CT A/P showed emphysematous cystitis, cholelithiasis and 1.6 cm distal CBD choledocholith. Duplex of lower extremities was positive for right common femoral DVT. (10 Sep 2019 03:34)      GI Hx:  84 yo F Hx of CKD, DL , RA, DMII Hx of cholelethiasis SP ERCP for ?IPMN in ? and evidence of cholelithiasis in the custic duct back then suggestive of a possible Hx of chronic cholycystitis admitted currently for complicated cystitis and fot abscess course of stay was complaicated by profound bradycardia and DVT which required IV abx and Heparin.    GI is being called for choledocholithiasis noted on a CT on Admission.  Patient had no RUQ, nausea, vomiting.     Previous colonoscopy(ies): None on file  Previous EGD(s): None on file. ERCP (by [facility]) No sphincterotomy Refer to GI Hx.        Review of system  General:  (-) weight loss, (-) fevers  Eyes:  (-) visual changes  CV:  (-) chest pain  Resp: (-) SOB, (-) wheezing  GI: (-) abdominal pain,  (-) nausea, (-) vomiting, (-) dysphagia, (-) diarrhea, (-) constipation, (-) rectal bleeding, (-) melena, (-) hematemesis.  Neuro: (-) confusion, (-) weakness  Psych:  (-) Hallucinations  Heme:  (-) easy bruisability    Past medical/surgical Hx:  PAST MEDICAL & SURGICAL HISTORY:  Depression  Chronic kidney disease (CKD)  Dyslipidemia  Hypertension  Diabetes mellitus    Home Medications:  atorvastatin 10 mg oral tablet: 1 tab(s) orally once a day  diclofenac 1% topical gel:   levothyroxine 125 mcg (0.125 mg) oral tablet: 1 tab(s) orally once a day  losartan 50 mg oral tablet: 1 tab(s) orally once a day  predniSONE 20 mg oral tablet: 1 tab(s) orally once a day      Allergies: No Known Allergies      Current Medications:   atorvastatin 10 milliGRAM(s) Oral at bedtime  cefepime   IVPB      cefepime   IVPB 1000 milliGRAM(s) IV Intermittent every 24 hours  chlorhexidine 4% Liquid 1 Application(s) Topical <User Schedule>  dextrose 40% Gel 15 Gram(s) Oral once PRN  dextrose 5%. 1000 milliLiter(s) IV Continuous <Continuous>  dextrose 50% Injectable 12.5 Gram(s) IV Push once  dextrose 50% Injectable 25 Gram(s) IV Push once  dextrose 50% Injectable 25 Gram(s) IV Push once  DOPamine Infusion 2.5 MICROgram(s)/kG/Min IV Continuous <Continuous>  glucagon  Injectable 1 milliGRAM(s) IntraMuscular once PRN  heparin  Infusion 700 Unit(s)/Hr IV Continuous <Continuous>  influenza   Vaccine 0.5 milliLiter(s) IntraMuscular once  insulin glargine Injectable (LANTUS) 15 Unit(s) SubCutaneous every morning  insulin lispro Injectable (HumaLOG) 5 Unit(s) SubCutaneous three times a day before meals  levothyroxine 125 MICROGram(s) Oral daily  lidocaine   Patch 1 Patch Transdermal daily  losartan 50 milliGRAM(s) Oral daily  pantoprazole    Tablet 40 milliGRAM(s) Oral before breakfast  predniSONE   Tablet 20 milliGRAM(s) Oral daily  silver sulfADIAZINE 1% Cream 1 Application(s) Topical every 12 hours  sodium chloride 0.9%. 1000 milliLiter(s) IV Continuous <Continuous>      Physical exam:  T(C): 36.1 (19 @ 05:07), Max: 36.2 (19 @ 20:32)  HR: 45 (19 @ 05:07) (45 - 73)  BP: 148/66 (19 @ 05:07) (148/66 - 172/90)  RR: 12 (19 @ 05:07) (12 - 18)  SpO2: 97% (19 @ 07:57) (97% - 98%)  GENERAL: NAD  HEAD:  Atraumatic, Normocephalic  EYES: Sclera:NL  NECK: Supple, no JVD or thyromegaly  CHEST/LUNG: Good bilateral air entry  HEART: normal S1, S2. Regular  ABDOMEN: (-) distended, (-) tender, (-) rebound, (+) BS, (-)HSM  EXTREMITIES: (-) edema  NEUROLOGY: (-) asterixis  SKIN: (-) jaundice  BG: (-) melena (-) brbpr    Data:                        10.8   11.34 )-----------( 280      ( 13 Sep 2019 06:09 )             34.4     MCV 92.5 (19)  92.0 (19)  91.9 (19)    RDW 14.6 (19)  14.6 (19)  14.7 (19)    HGB trend:  10.8  19 @ 06:09  12.5  19 @ 04:00  12.9  19 @ 16:51  12.5  19 @ 07:30  12.6  09-10-19 @ 14:30        09-13    138  |  110  |  57<H>  ----------------------------<  196<H>  4.9   |  19  |  1.8<H>    Ca    8.4<L>      13 Sep 2019 06:09  Phos  3.5       Mg     2.0         TPro  3.7<L>  /  Alb  1.8<L>  /  TBili  <0.2  /  DBili  x   /  AST  21  /  ALT  40  /  AlkPhos  185<H>      Liver panel trend:  TBili <0.2   /   AST 21   /   ALT 40   /   AlkP 185   /   Tptn 3.7   /   Alb 1.8    /   DBili --        TBili <0.2   /   AST 28   /   ALT 48   /   AlkP 224   /   Tptn 4.4   /   Alb 2.1    /   DBili --        TBili <0.2   /   AST 25   /   ALT 46   /   AlkP 229   /   Tptn 5.2   /   Alb 2.2    /   DBili --        TBili 0.4   /   AST 30   /   ALT 45   /   AlkP 219   /   Tptn 4.4   /   Alb 1.9    /   DBili --        TBili 0.4   /   AST 50   /   ALT 62   /   AlkP 257   /   Tptn 4.6   /   Alb 2.2    /   DBili --      09-10  TBili 0.6   /   AST 39   /   ALT 53   /   AlkP 181   /   Tptn 5.4   /   Alb 2.5    /   DBili --              PTT - ( 13 Sep 2019 06:09 )  PTT:29.1 sec    Culture - Abscess with Gram Stain (collected 11 Sep 2019 16:10)  Source: .Abscess Leg - Right  Preliminary Report (12 Sep 2019 23:03):    Few Streptococcus agalactiae (Group B) isolated    Group B streptococci are susceptible to ampicillin,    penicillin and cefazolin, but may be resistant to    erythromycin and clindamycin.    Recommendations for intrapartum prophylaxis for Group B    streptococci are penicillin or ampicillin.

## 2019-09-14 NOTE — PROGRESS NOTE ADULT - SUBJECTIVE AND OBJECTIVE BOX
SUBJECTIVE:    Patient is a 83y old Female who presents with a chief complaint of Emphysematous cystitis (14 Sep 2019 11:46)    Currently admitted to medicine with the primary diagnosis of Emphysematous cystitis     Today is hospital day 4d. This morning she is resting comfortably in bed and reports no new issues or overnight events. Patient is lying comfortably on the bed. Denies fevers, chills, chest pain, shortness of breath, and abdominal pain. States she has LE pain. Also states she has had diarrhea about 3x.    PAST MEDICAL & SURGICAL HISTORY  Depression  Chronic kidney disease (CKD)  Dyslipidemia  Hypertension  Diabetes mellitus      ALLERGIES:  No Known Allergies    MEDICATIONS:  STANDING MEDICATIONS  amLODIPine   Tablet 5 milliGRAM(s) Oral daily  atorvastatin 10 milliGRAM(s) Oral at bedtime  cefepime   IVPB      cefepime   IVPB 1000 milliGRAM(s) IV Intermittent every 24 hours  chlorhexidine 4% Liquid 1 Application(s) Topical <User Schedule>  dextrose 5%. 1000 milliLiter(s) IV Continuous <Continuous>  dextrose 50% Injectable 12.5 Gram(s) IV Push once  dextrose 50% Injectable 25 Gram(s) IV Push once  dextrose 50% Injectable 25 Gram(s) IV Push once  DOPamine Infusion 1 MICROgram(s)/kG/Min IV Continuous <Continuous>  heparin  Infusion 800 Unit(s)/Hr IV Continuous <Continuous>  influenza   Vaccine 0.5 milliLiter(s) IntraMuscular once  insulin glargine Injectable (LANTUS) 15 Unit(s) SubCutaneous every morning  insulin lispro Injectable (HumaLOG) 5 Unit(s) SubCutaneous three times a day before meals  levothyroxine 125 MICROGram(s) Oral daily  lidocaine   Patch 1 Patch Transdermal daily  losartan 50 milliGRAM(s) Oral daily  pantoprazole    Tablet 40 milliGRAM(s) Oral before breakfast  predniSONE   Tablet 20 milliGRAM(s) Oral daily  silver sulfADIAZINE 1% Cream 1 Application(s) Topical every 12 hours  sodium chloride 0.9%. 1000 milliLiter(s) IV Continuous <Continuous>    PRN MEDICATIONS  dextrose 40% Gel 15 Gram(s) Oral once PRN  glucagon  Injectable 1 milliGRAM(s) IntraMuscular once PRN    VITALS:   T(F): --  HR: 69  BP: 207/86  RR: 18  SpO2: --    LABS:                        10.6   9.79  )-----------( 261      ( 14 Sep 2019 05:29 )             33.3     09-14    139  |  110  |  60<H>  ----------------------------<  209<H>  4.7   |  18  |  1.7<H>    Ca    8.4<L>      14 Sep 2019 05:29  Phos  3.5     09-13  Mg     2.0     09-14    TPro  3.6<L>  /  Alb  1.7<L>  /  TBili  <0.2  /  DBili  x   /  AST  23  /  ALT  41  /  AlkPhos  176<H>  09-14    PT/INR - ( 14 Sep 2019 05:29 )   PT: 10.90 sec;   INR: 0.95 ratio         PTT - ( 14 Sep 2019 05:29 )  PTT:56.3 sec    ABG - ( 12 Sep 2019 16:48 )  pH, Arterial: 7.37  pH, Blood: x     /  pCO2: 31    /  pO2: 95    / HCO3: 18    / Base Excess: -6.3  /  SaO2: 98                    Culture - Abscess with Gram Stain (collected 11 Sep 2019 16:10)  Source: .Abscess Leg - Right  Preliminary Report (13 Sep 2019 17:43):    Rare Pseudomonas aeruginosa    Few Streptococcus agalactiae (Group B) isolated    Group B streptococci are susceptible to ampicillin,    penicillin and cefazolin, but may be resistant to    erythromycin and clindamycin.    Recommendations for intrapartum prophylaxis for Group B    streptococci are penicillin or ampicillin.      CARDIAC MARKERS ( 13 Sep 2019 06:09 )  x     / 0.03 ng/mL / x     / x     / x      CARDIAC MARKERS ( 13 Sep 2019 04:00 )  x     / 0.04 ng/mL / x     / x     / x          PHYSICAL EXAM:  GEN: No acute distress. Lying comfortably on the bed. Depressed affect.  PULM/CHEST: Clear to auscultation bilaterally, no rales, rhonchi or wheezes; exam limited to anterior auscultation  CVS: bradycardic, S1-S2, no murmurs  ABD: Soft, non-tender, non-distended, normoactive BS  EXT: Patient has LE cellulitic wounds wrapped in dressing and ACE wraps  NEURO: AAOx3    Morris Catheter:   Indwelling Urethral Catheter:     Connect To:  Straight Drainage/Gravity    Indication:  Urine Output Monitoring in Critically Ill (09-11-19 @ 04:33) (not performed) [active]

## 2019-09-14 NOTE — PROGRESS NOTE ADULT - ASSESSMENT
#frequent falls - debility - pt/rehab - needs SNF  #riht shoulder cuff tear - pt unable raise arm - ortho eval done - wbat, pt - outpt folloup  < from: Xray Shoulder 2 Views, Right (09.09.19 @ 21:50) >    Findings consistent with acromioclavicular joint separation of   indeterminate age with widening of the AC joint interval measuring 1 cm.   Correlate clinically for acute symptoms.      < end of copied text >    #ecoli uti/cystitis/polymicrobial Le cellulitis - on cefepime  #hematuria - resolved - pt on Iv heparin - monitor h/h. and PTT, uro recs noted  #Right CF dvt - started on iv heparin - monitor PTT - 50-80 - if hematuria worsens may need to call vascular for filter  PT/INR - ( 14 Sep 2019 05:29 )   PT: 10.90 sec;   INR: 0.95 ratio         PTT - ( 14 Sep 2019 05:29 )  PTT:56.3 sec  #BALDEV on CKD III - likely prerenal - IVfs - monitor renal function improving        #CBD dilation - lfts wnl - asymptomatic - no signs of cholangitis - no intervention by GI due to multiple comorbid conditions   #DMII - insulin coverage -   CAPILLARY BLOOD GLUCOSE      POCT Blood Glucose.: 248 mg/dL (14 Sep 2019 11:47)  POCT Blood Glucose.: 183 mg/dL (14 Sep 2019 08:11)  POCT Blood Glucose.: 228 mg/dL (13 Sep 2019 21:27)  POCT Blood Glucose.: 192 mg/dL (13 Sep 2019 16:26)      #hxof RA on prednisone .    # mobitz 1 -  - avoid AVnodal blocking agents - cont tele - EPS eval   spoke with Dr ramos - law into 20s and sinus pauses - started on dopamine -  discussed with Dr rodas team - decerase dopamine to 1 mcg/kg/min -treat HTN - can give norvasc  follow up further recs from eps - need for ppm  #uncontrolled htn  due to dopamine    #diarrhea - on abxs - check cdiff

## 2019-09-14 NOTE — PROGRESS NOTE ADULT - SUBJECTIVE AND OBJECTIVE BOX
Patient is a 83y old  Female who presents with a chief complaint of Emphysematous cystitis (13 Sep 2019 15:14)      Subjective:  Patient seen and examined at bedside.        Review Of Systems: Reports dizziness sometimes. No chest pain, no shortness of breath.         Medications:  atorvastatin 10 milliGRAM(s) Oral at bedtime  cefepime   IVPB      cefepime   IVPB 1000 milliGRAM(s) IV Intermittent every 24 hours  chlorhexidine 4% Liquid 1 Application(s) Topical <User Schedule>  dextrose 40% Gel 15 Gram(s) Oral once PRN  dextrose 5%. 1000 milliLiter(s) IV Continuous <Continuous>  dextrose 50% Injectable 12.5 Gram(s) IV Push once  dextrose 50% Injectable 25 Gram(s) IV Push once  dextrose 50% Injectable 25 Gram(s) IV Push once  DOPamine Infusion 2.5 MICROgram(s)/kG/Min IV Continuous <Continuous>  glucagon  Injectable 1 milliGRAM(s) IntraMuscular once PRN  heparin  Infusion 800 Unit(s)/Hr IV Continuous <Continuous>  influenza   Vaccine 0.5 milliLiter(s) IntraMuscular once  insulin glargine Injectable (LANTUS) 15 Unit(s) SubCutaneous every morning  insulin lispro Injectable (HumaLOG) 5 Unit(s) SubCutaneous three times a day before meals  levothyroxine 125 MICROGram(s) Oral daily  lidocaine   Patch 1 Patch Transdermal daily  losartan 50 milliGRAM(s) Oral daily  pantoprazole    Tablet 40 milliGRAM(s) Oral before breakfast  predniSONE   Tablet 20 milliGRAM(s) Oral daily  silver sulfADIAZINE 1% Cream 1 Application(s) Topical every 12 hours  sodium chloride 0.9%. 1000 milliLiter(s) IV Continuous <Continuous>      PAST MEDICAL & SURGICAL HISTORY:  Depression  Chronic kidney disease (CKD)  Dyslipidemia  Hypertension  Diabetes mellitus      Objective:  Vitals:  T(F): --  HR: 69 (09-14) (38 - 76)  BP: 207/86 (09-14) (107/52 - 207/86)  RR: 18 (09-14)  SpO2: --  I&O's Summary    13 Sep 2019 07:01  -  14 Sep 2019 07:00  --------------------------------------------------------  IN: 315 mL / OUT: 600 mL / NET: -285 mL    14 Sep 2019 07:01  -  14 Sep 2019 11:06  --------------------------------------------------------  IN: 240 mL / OUT: 0 mL / NET: 240 mL        Physical Exam:  Appearance: No acute distress; well appearing  Eyes: PERRL, EOMI, pink conjunctiva  HENT: Normal oral muscosa  Cardiovascular: Bradycardia, S1, S2, no murmurs, rubs, or gallops; no edema; no JVD  Respiratory: Clear to auscultation bilaterally  Gastrointestinal: soft, non-tender, non-distended with normal bowel sounds  Musculoskeletal: No clubbing; no joint deformity   Neurologic: Non-focal  Lymphatic: No lymphadenopathy  Psychiatry: AAOx3, mood & affect appropriate  Skin: No rashes, ecchymoses, or cyanosis                          10.6   9.79  )-----------( 261      ( 14 Sep 2019 05:29 )             33.3     09-14    139  |  110  |  60<H>  ----------------------------<  209<H>  4.7   |  18  |  1.7<H>    Ca    8.4<L>      14 Sep 2019 05:29  Phos  3.5     09-13  Mg     2.0     09-14    TPro  3.6<L>  /  Alb  1.7<L>  /  TBili  <0.2  /  DBili  x   /  AST  23  /  ALT  41  /  AlkPhos  176<H>  09-14    PT/INR - ( 14 Sep 2019 05:29 )   PT: 10.90 sec;   INR: 0.95 ratio         PTT - ( 14 Sep 2019 05:29 )  PTT:56.3 sec  CARDIAC MARKERS ( 13 Sep 2019 06:09 )  x     / 0.03 ng/mL / x     / x     / x      CARDIAC MARKERS ( 13 Sep 2019 04:00 )  x     / 0.04 ng/mL / x     / x     / x          Serum Pro-Brain Natriuretic Peptide: 3503 pg/mL (09-09 @ 20:50)          New ECG(s): Personally reviewed    Echo: < from: Transthoracic Echocardiogram (09.10.19 @ 12:16) >  Summary:   1. Left ventricular ejection fraction, by visual estimation, is 45 to   50%.   2. Mildly decreased global left ventricular systolic function.   3. Mild-moderate tricuspid regurgitation.   4. PSAP at least 40.   5. Mild to moderate pulmonic valve regurgitation.   6. Estimated pulmonary artery systolic pressure is 46.5 mmHg assuming a   right atrial pressure of 8 mmHg, which is consistent with mild pulmonary   hypertension.    < end of copied text >      Interpretation of Telemetry:  Mobitz type 1 Patient is a 83y old  Female who presents with a chief complaint of Emphysematous cystitis (13 Sep 2019 15:14)      Subjective:  Patient seen and examined at bedside. in disconfort        Review Of Systems: Reports dizziness sometimes. No chest pain, no shortness of breath.         Medications:  atorvastatin 10 milliGRAM(s) Oral at bedtime  cefepime   IVPB      cefepime   IVPB 1000 milliGRAM(s) IV Intermittent every 24 hours  chlorhexidine 4% Liquid 1 Application(s) Topical <User Schedule>  dextrose 40% Gel 15 Gram(s) Oral once PRN  dextrose 5%. 1000 milliLiter(s) IV Continuous <Continuous>  dextrose 50% Injectable 12.5 Gram(s) IV Push once  dextrose 50% Injectable 25 Gram(s) IV Push once  dextrose 50% Injectable 25 Gram(s) IV Push once  DOPamine Infusion 2.5 MICROgram(s)/kG/Min IV Continuous <Continuous>  glucagon  Injectable 1 milliGRAM(s) IntraMuscular once PRN  heparin  Infusion 800 Unit(s)/Hr IV Continuous <Continuous>  influenza   Vaccine 0.5 milliLiter(s) IntraMuscular once  insulin glargine Injectable (LANTUS) 15 Unit(s) SubCutaneous every morning  insulin lispro Injectable (HumaLOG) 5 Unit(s) SubCutaneous three times a day before meals  levothyroxine 125 MICROGram(s) Oral daily  lidocaine   Patch 1 Patch Transdermal daily  losartan 50 milliGRAM(s) Oral daily  pantoprazole    Tablet 40 milliGRAM(s) Oral before breakfast  predniSONE   Tablet 20 milliGRAM(s) Oral daily  silver sulfADIAZINE 1% Cream 1 Application(s) Topical every 12 hours  sodium chloride 0.9%. 1000 milliLiter(s) IV Continuous <Continuous>      PAST MEDICAL & SURGICAL HISTORY:  Depression  Chronic kidney disease (CKD)  Dyslipidemia  Hypertension  Diabetes mellitus      Objective:  Vitals:  T(F): --  HR: 69 (09-14) (38 - 76)  BP: 207/86 (09-14) (107/52 - 207/86)  RR: 18 (09-14)  SpO2: --  I&O's Summary    13 Sep 2019 07:01  -  14 Sep 2019 07:00  --------------------------------------------------------  IN: 315 mL / OUT: 600 mL / NET: -285 mL    14 Sep 2019 07:01  -  14 Sep 2019 11:06  --------------------------------------------------------  IN: 240 mL / OUT: 0 mL / NET: 240 mL        Physical Exam:  Appearance: No acute distress; well appearing  Eyes: PERRL, EOMI, pink conjunctiva  HENT: Normal oral muscosa  Cardiovascular: Bradycardia, S1, S2, no murmurs, rubs, or gallops; no edema; no JVD  Respiratory: Clear to auscultation bilaterally  Gastrointestinal: soft, non-tender, non-distended with normal bowel sounds  Musculoskeletal: No clubbing; no joint deformity   Neurologic: Non-focal  Lymphatic: No lymphadenopathy  Psychiatry: AAOx3, mood & affect appropriate  Skin: No rashes, ecchymoses, or cyanosis                          10.6   9.79  )-----------( 261      ( 14 Sep 2019 05:29 )             33.3     09-14    139  |  110  |  60<H>  ----------------------------<  209<H>  4.7   |  18  |  1.7<H>    Ca    8.4<L>      14 Sep 2019 05:29  Phos  3.5     09-13  Mg     2.0     09-14    TPro  3.6<L>  /  Alb  1.7<L>  /  TBili  <0.2  /  DBili  x   /  AST  23  /  ALT  41  /  AlkPhos  176<H>  09-14    PT/INR - ( 14 Sep 2019 05:29 )   PT: 10.90 sec;   INR: 0.95 ratio         PTT - ( 14 Sep 2019 05:29 )  PTT:56.3 sec  CARDIAC MARKERS ( 13 Sep 2019 06:09 )  x     / 0.03 ng/mL / x     / x     / x      CARDIAC MARKERS ( 13 Sep 2019 04:00 )  x     / 0.04 ng/mL / x     / x     / x          Serum Pro-Brain Natriuretic Peptide: 3503 pg/mL (09-09 @ 20:50)          New ECG(s): Personally reviewed    Echo: < from: Transthoracic Echocardiogram (09.10.19 @ 12:16) >  Summary:   1. Left ventricular ejection fraction, by visual estimation, is 45 to   50%.   2. Mildly decreased global left ventricular systolic function.   3. Mild-moderate tricuspid regurgitation.   4. PSAP at least 40.   5. Mild to moderate pulmonic valve regurgitation.   6. Estimated pulmonary artery systolic pressure is 46.5 mmHg assuming a   right atrial pressure of 8 mmHg, which is consistent with mild pulmonary   hypertension.    < end of copied text >      Interpretation of Telemetry:  Mobitz type 1

## 2019-09-14 NOTE — PROGRESS NOTE ADULT - SUBJECTIVE AND OBJECTIVE BOX
HOSPITALIST ATTENDING NOTE    FREDI ORTIZ  83y Female  [id number]    INTERVAL HPI/OVERNIGHT EVENTS: diarrhea x3 today, edematous, HR down in 20-30s on dopamine with bp high    T(C): --  HR: 69 (09-14-19 @ 10:17) (38 - 76)  BP: 207/86 (09-14-19 @ 10:17) (107/52 - 207/86)  RR: 18 (09-14-19 @ 10:17) (18 - 18)  SpO2: --  Wt(kg): --    09-13-19 @ 07:01  -  09-14-19 @ 07:00  --------------------------------------------------------  IN: 315 mL / OUT: 600 mL / NET: -285 mL    09-14-19 @ 07:01  -  09-14-19 @ 11:47  --------------------------------------------------------  IN: 240 mL / OUT: 0 mL / NET: 240 mL    Pe  nad  aaox2  Tribal  [id number]  ctabl  soft ntnd+bs  legs wrapped - edematous,right 2nd toe ecchymosis   +watson - clear urine  anasarcic    Consultant(s) Notes Reviewed:  [x ] YES  [ ] NO  Care Discussed with Consultants/Other Providers/ Housestaff [ x] YES  [ ] NO    LABS:                        10.6   9.79  )-----------( 261      ( 14 Sep 2019 05:29 )             33.3     09-14    139  |  110  |  60<H>  ----------------------------<  209<H>  4.7   |  18  |  1.7<H>        Creatinine: 1.7 (09-14 @ 05:29)    Creatinine: 1.8 (09-13 @ 06:09)    Creatinine: 1.8 (09-13 @ 04:00)    Creatinine: 1.8 (09-13 @ 01:13)    Creatinine: 1.9 (09-12 @ 16:51)    Creatinine: 2.0 (09-11 @ 07:30)    Creatinine: 2.1 (09-10 @ 14:30)    Creatinine: 2.4 (09-09 @ 20:05)      Ca    8.4<L>      14 Sep 2019 05:29  Phos  3.5     09-13  Mg     2.0     09-14    TPro  3.6<L>  /  Alb  1.7<L>  /  TBili  <0.2  /  DBili  x   /  AST  23  /  ALT  41  /  AlkPhos  176<H>  09-14      Culture - Abscess with Gram Stain (collected 11 Sep 2019 16:10)  Source: .Abscess Leg - Right  Preliminary Report (13 Sep 2019 17:43):    Rare Pseudomonas aeruginosa    Few Streptococcus agalactiae (Group B) isolated    Group B streptococci are susceptible to ampicillin,    penicillin and cefazolin, but may be resistant to    erythromycin and clindamycin.    Recommendations for intrapartum prophylaxis for Group B    streptococci are penicillin or ampicillin.          RADIOLOGY & ADDITIONAL TESTS:    Imaging or report Personally Reviewed:  [ ] YES  [ ] NO    Case discussed with resident    Care discussed with pt/family      HEALTH ISSUES - PROBLEM Dx:

## 2019-09-14 NOTE — PROGRESS NOTE ADULT - ATTENDING COMMENTS
High degree AV block  periods of complete heart block  longest pause 3.5 sec, caused by non-sustained AT leading to higher degree AV block  treat HTN   titrate dopamine to 2-3 drip dose, and monitor for changes in heart rate.  Once patient cleared by ID and infection cleared, will plan PPM  PPM will be placed on warfarin with therapeutic INR (we will avoid PPM placement on heparin) COVERING DR RICHARDSON    High degree AV block  periods of complete heart block  longest pause 3.5 sec, caused by non-sustained AT leading to higher degree AV block  treat HTN   titrate dopamine to 2-3 drip dose, and monitor for changes in heart rate.  Once patient cleared by ID and infection cleared, will plan PPM  PPM will be placed on warfarin with therapeutic INR (we will avoid PPM placement on heparin)

## 2019-09-14 NOTE — PROGRESS NOTE ADULT - ASSESSMENT
83 year old female with PMH of DM II, anemia, CKD, DLD, RA, depression brought in by EMS from home for weakness, decreased ambulation, frequent falls and frequent urination. Admitted with multiple comorbidities.    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  - c/w telemetry monitoring  - check TSH  - echo- EF 45-50, mild P HTN  - Episode of 5s pause and two episodes of 3s pause  - Wean off dopamine to 1-2mcg/kg/min to target -140  - Will also start on amlodipine 5mg for HTN due to dopamine  - Will likely need PPM but needs to be medically cleared for UTI and cellulitis  - EP following    # UTI , Positive UA with hematuria and increased urinary frequency  - Sepsis ruled out on admission, WBC 20  - Leukocytosis trending down  - CT A/P showed emphysematous cystitis  - Urology eval: no intervention, care discussed with urology- for new hematuria- watson irrigation, c/w heparin, hematuria could be 2/2 UTI and heparin.   - ID Recs appreciated: cellulitis LLE; no evidence of cholestasis in setting of dilated CBD; no acute cholecystitis or cholangitis; urine cx 9/09 NGTD; urine cx 9/10 showed E coli; wound cx showed Morganella  - Low suspicion for emphysematous cystitis, C/w Cefepime 1g IV q24h for now; could change to PO Cipro 250mg q12h for 10 more days on discharge  - US kidney bladder - no hydro, bladder not visualized    # Diarrhea  - Obtain C diff stool culture    # Right common femoral DVT on heparin gtt  - Vascular consult appreciated - heparin in hospital then Eliquis on discharge; f/u w/in 2 mos w/ vascular  - Arterial duplex negative  - Continue Heparin infusion for now; 60-80 goal  - C/w heparin to 8ml/hr; f/u PTT 4pm    # BALDEV on CKD 3- improving.   - Cr 12/28/2018 was 1.3.   - Possibly prerenal - s/p 1 liter IV bolus  - Monitor Cr  - Avoid nephrotoxic agents  - c/w NS at 50 cc/hr  - Currently 1.7, continue to monitor    # Common bile duct choledocholith  - Denies pain, and unremarkable physical exam  - Mild transaminitis  - Surgical eval: no surgical intervention  - US abdomen: +ve cholelithiasis, no cholecystitis.   - GI recs appreciated: Given no evidence of cholangitis or obstructive sx, would defer intervention in active infection and cardiac comorbidity - ERCP to be considered vs IR biliary drainage should the prior conditions develop    # Bilateral shins ulceration with erythema and discharge  - Left shin purulence culture noted.   - s/p Ancef, Vanco and Zosyn in ED  - ID on board- silverdene topical ointment to legs.   - Burn following- wound care-  soap and water qd, silvadene cream , ABD pad, kerlex and ace wrap bid - operative debridement under local anesthesia on Monday  - Will increase dopamine to 5mcg/kg/min on Monday preop    # Rt shoulder pain  - X ray acromioclavicular joint separation of indeterminate age with widening of the AC joint interval measuring 1 cm.   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   - c/w basal and bolus insulin regimen  - Monitor Fingersticks  - Carb consistent diet    # Rheumatoid arthritis  - On Prednisone 20mg qd    # DLD - Continue statin    # Elder self-neglect  - poor nutritional status- hypo-albuminemia- RD eval.   - Unsafe living condition  - Social work assessment  - Needs placement    GI ppx: Protonix  DVT ppx/tx: on Heparin infusion    Dispo: from home, needs placement

## 2019-09-14 NOTE — PROGRESS NOTE ADULT - ASSESSMENT
Patient is a 83y old  Female who presents with a chief complaint of Emphysematous cystitis (13 Sep 2019 15:14)    PAST MEDICAL & SURGICAL HISTORY:  Depression  Chronic kidney disease (CKD)  Dyslipidemia  Hypertension  Diabetes mellitus  2nd degree AV block Mobitz 1  Peripheral LE ulcers  Cystitis  Rt common femoral DVT on heparin gtt    Recommendations:   Wean off dopamine to 1-2 mcg/kg/min to target -140, if need be start amlodipine 5 mg   Continue heparin gtt until debridement, then coumadin Patient is a 83y old  Female who presents with a chief complaint of Emphysematous cystitis (13 Sep 2019 15:14)    PAST MEDICAL & SURGICAL HISTORY:  Depression  Chronic kidney disease (CKD)  Dyslipidemia  Hypertension  Diabetes mellitus  2nd degree AV block Mobitz 1  Peripheral LE ulcers  Cystitis  Rt common femoral DVT on heparin gtt    Recommendations:   Wean off dopamine to 2-3 mcg/kg/min to target -140, if need be start amlodipine 5 mg   Continue heparin gtt until debridement, then coumadin

## 2019-09-15 NOTE — PHYSICAL THERAPY INITIAL EVALUATION ADULT - PLANNED THERAPY INTERVENTIONS, PT EVAL
transfer training/balance training/bed mobility training/neuromuscular re-education/strengthening/ROM/gait training/stretching

## 2019-09-15 NOTE — PROGRESS NOTE ADULT - ASSESSMENT
#bradycardia with high degree with episodes of high degree AV Block:   on dopamine   spoke to EP Dr Escobar and needs to be cleared from an infectious stand point for a PPM.   BURN planning on taking patient to OR tomorrow for  right lower leg excisional debridement to and including subcut tissue. EP plans to place a temporary pace maker before surgery. I did communicate  this to BURN. will keep patient npo after MN.      #LE ulcer: burn planning on debridement       #ecoli uti/cystitis/polymicrobial Le cellulitis - on cefepime per ID     #hematuria - resolved - pt on Iv heparin - monitor h/h. and PTT, uro recs noted    #Right CF dvt - started on iv heparin - monitor PTT - 60-80. hold priot to surgery tomorrow     #BALDEV on CKD III - monitor renal function stable at 1.7     #per RN no diarrhea       #CBD dilation - lfts wnl - asymptomatic - no signs of cholangitis - no intervention by GI due to multiple comorbid conditions     #DMII - insulin coverage -     #hxof RA on prednisone .    #frequent falls - debility - pt/rehab - needs SNF    #riht shoulder cuff tear - pt unable raise arm - ortho eval done - wbat, pt - outpt folloup  Findings consistent with acromioclavicular joint separation of   indeterminate age with widening of the AC joint interval measuring 1 cm.   Correlate clinically for acute symptoms.    #Progress Note Handoff  Pending (specify):  EP follow up, burn follow up   Family discussion: will call son  Disposition: likely snf when medically ready

## 2019-09-15 NOTE — PROGRESS NOTE ADULT - ATTENDING COMMENTS
COVERING DR RICHARDSON    High degree AV block  periods of complete heart block  longest pause 3.5 sec, caused by non-sustained AT leading to higher degree AV block  treat HTN   titrate dopamine to 2-3 drip dose, and monitor for changes in heart rate.  Once patient cleared by ID and infection cleared, will plan permanent PPM  PPM will be placed on warfarin with therapeutic INR (we will avoid PPM placement on heparin)    Recommend temporary-permanent Right IJ lead for the treatment of intermittent complete AV block, as patient is not cleared for permanent PPM and as she needs surgical debridement in the OR.  Procedure will be performed Mon or Tue  Keep NPO past midnight on Sunday  Hold heparin at 2am

## 2019-09-15 NOTE — PROGRESS NOTE ADULT - SUBJECTIVE AND OBJECTIVE BOX
Patient is a 83y old  Female who presents with a chief complaint of Emphysematous cystitis (13 Sep 2019 15:14)      Subjective:  Patient seen and examined at bedside. feeling better than yesterday        Review Of Systems: Reports dizziness sometimes. No chest pain, no shortness of breath.         MEDICATIONS  (STANDING):  amLODIPine   Tablet 5 milliGRAM(s) Oral daily  atorvastatin 10 milliGRAM(s) Oral at bedtime  cefepime   IVPB      cefepime   IVPB 1000 milliGRAM(s) IV Intermittent every 24 hours  chlorhexidine 4% Liquid 1 Application(s) Topical <User Schedule>  dextrose 5%. 1000 milliLiter(s) (50 mL/Hr) IV Continuous <Continuous>  dextrose 50% Injectable 12.5 Gram(s) IV Push once  dextrose 50% Injectable 25 Gram(s) IV Push once  dextrose 50% Injectable 25 Gram(s) IV Push once  DOPamine Infusion 1 MICROgram(s)/kG/Min (2.4 mL/Hr) IV Continuous <Continuous>  heparin  Infusion 850 Unit(s)/Hr (7 mL/Hr) IV Continuous <Continuous>  influenza   Vaccine 0.5 milliLiter(s) IntraMuscular once  insulin glargine Injectable (LANTUS) 15 Unit(s) SubCutaneous every morning  insulin lispro Injectable (HumaLOG) 5 Unit(s) SubCutaneous three times a day before meals  levothyroxine 125 MICROGram(s) Oral daily  lidocaine   Patch 1 Patch Transdermal daily  losartan 50 milliGRAM(s) Oral daily  pantoprazole    Tablet 40 milliGRAM(s) Oral before breakfast  predniSONE   Tablet 20 milliGRAM(s) Oral daily  silver sulfADIAZINE 1% Cream 1 Application(s) Topical every 12 hours        PAST MEDICAL & SURGICAL HISTORY:  Depression  Chronic kidney disease (CKD)  Dyslipidemia  Hypertension  Diabetes mellitus      ICU Vital Signs Last 24 Hrs  T(C): 35.6 (15 Sep 2019 12:00), Max: 36.7 (15 Sep 2019 02:13)  T(F): 96.1 (15 Sep 2019 12:00), Max: 98 (15 Sep 2019 02:13)  HR: 73 (15 Sep 2019 12:00) (43 - 89)  BP: 135/63 (15 Sep 2019 12:00) (117/56 - 162/68)  BP(mean): --  ABP: --  ABP(mean): --  RR: 18 (15 Sep 2019 12:00) (17 - 18)  SpO2: --    Physical Exam:  Appearance: No acute distress; well appearing  Eyes: PERRL, EOMI, pink conjunctiva  HENT: Normal oral muscosa  Cardiovascular: Bradycardia, S1, S2, no murmurs, rubs, or gallops; no edema; no JVD  Respiratory: Clear to auscultation bilaterally  Gastrointestinal: soft, non-tender, non-distended with normal bowel sounds  Musculoskeletal: No clubbing; no joint deformity   Neurologic: Non-focal  Lymphatic: No lymphadenopathy  Psychiatry: AAOx3, mood & affect appropriate  Skin: No rashes, ecchymoses, or cyanosis                          10.6   9.79  )-----------( 261      ( 14 Sep 2019 05:29 )             33.3     09-14    139  |  110  |  60<H>  ----------------------------<  209<H>  4.7   |  18  |  1.7<H>    Ca    8.4<L>      14 Sep 2019 05:29  Phos  3.5     09-13  Mg     2.0     09-14    TPro  3.6<L>  /  Alb  1.7<L>  /  TBili  <0.2  /  DBili  x   /  AST  23  /  ALT  41  /  AlkPhos  176<H>  09-14    PT/INR - ( 14 Sep 2019 05:29 )   PT: 10.90 sec;   INR: 0.95 ratio         PTT - ( 14 Sep 2019 05:29 )  PTT:56.3 sec  CARDIAC MARKERS ( 13 Sep 2019 06:09 )  x     / 0.03 ng/mL / x     / x     / x      CARDIAC MARKERS ( 13 Sep 2019 04:00 )  x     / 0.04 ng/mL / x     / x     / x          Serum Pro-Brain Natriuretic Peptide: 3503 pg/mL (09-09 @ 20:50)          New ECG(s): Personally reviewed    Echo: < from: Transthoracic Echocardiogram (09.10.19 @ 12:16) >  Summary:   1. Left ventricular ejection fraction, by visual estimation, is 45 to   50%.   2. Mildly decreased global left ventricular systolic function.   3. Mild-moderate tricuspid regurgitation.   4. PSAP at least 40.   5. Mild to moderate pulmonic valve regurgitation.   6. Estimated pulmonary artery systolic pressure is 46.5 mmHg assuming a   right atrial pressure of 8 mmHg, which is consistent with mild pulmonary   hypertension.    < end of copied text >      Interpretation of Telemetry:  Mobitz type 1

## 2019-09-15 NOTE — PROGRESS NOTE ADULT - SUBJECTIVE AND OBJECTIVE BOX
patient has no chest pain   no sob   feels tired     Vital Signs Last 24 Hrs  T(C): 35.7 (15 Sep 2019 08:12), Max: 36.7 (15 Sep 2019 02:13)  T(F): 96.2 (15 Sep 2019 08:12), Max: 98 (15 Sep 2019 02:13)  HR: 71 (15 Sep 2019 08:12) (43 - 89)  BP: 135/65 (15 Sep 2019 08:12) (117/56 - 162/68)  BP(mean): --  RR: 18 (15 Sep 2019 08:12) (17 - 18)  SpO2: --    PHYSICAL EXAM:  GENERAL: NAD,  CHEST/LUNG: Clear to auscultation bilaterally; No wheeze  HEART:law   GI: Soft, Nontender, Nondistended; Bowel sounds present  EXTREMITIES:  legs wrapped - edematous,right 2nd toe ecchymosis.  +watson - clear urine  NEUROLOGY: non-focal                            11.0   9.27  )-----------( 274      ( 15 Sep 2019 06:27 )             34.7     09-15    138  |  112<H>  |  56<H>  ----------------------------<  119<H>  4.6   |  18  |  1.7<H>    Ca    8.5      15 Sep 2019 06:27  Mg     2.0     09-15    TPro  3.8<L>  /  Alb  1.7<L>  /  TBili  0.2  /  DBili  x   /  AST  20  /  ALT  37  /  AlkPhos  170<H>  09-15    LIVER FUNCTIONS - ( 15 Sep 2019 06:27 )  Alb: 1.7 g/dL / Pro: 3.8 g/dL / ALK PHOS: 170 U/L / ALT: 37 U/L / AST: 20 U/L / GGT: x           PT/INR - ( 14 Sep 2019 05:29 )   PT: 10.90 sec;   INR: 0.95 ratio         PTT - ( 15 Sep 2019 06:27 )  PTT:106.9 sec        Culture - Blood (collected 13 Sep 2019 04:00)  Source: .Blood Blood  Preliminary Report (14 Sep 2019 14:02):    No growth to date.

## 2019-09-15 NOTE — PHYSICAL THERAPY INITIAL EVALUATION ADULT - GENERAL OBSERVATIONS, REHAB EVAL
Attempted to see pt for PT Initial Evaluation, pt currently eating lunch. Will f/u for PT.
Patient encountered lying in bed (+) O2 via NC @ 2lpm (+) IV infusion  heparin and dopamine on R arm.

## 2019-09-16 NOTE — PROGRESS NOTE ADULT - ASSESSMENT
83 year old female with PMH of DM II, anemia, CKD, DLD, RA, depression brought in by EMS from home for weakness, decreased ambulation, frequent falls and frequent urination. Found to have Wenckebach  block and periods of 2:1 block. TSH 3.75, EF 45-50% with mild to mod TR/MA. Continues to drop HR to 30s, asymptomatic.     Wenckebach  block and periods of 2:1 block  Bradycardic to 30s when asleep    - Patient asymptomatic and stable with low HR. Occurs mainly when asleep - recommend BiPAP when asleep  - Plan for temporary/permanent PM in the AM prior to OR with Burn  - PPM required when medically optimized and patient free of infection  - Would continue Dopamine for HR  - Pacing pads and atropine at bedside  - Will need w/u as OP for KP    Plan discussed with EP attending

## 2019-09-16 NOTE — PROGRESS NOTE ADULT - SUBJECTIVE AND OBJECTIVE BOX
INTERVAL HPI/OVERNIGHT EVENTS:  Patient continues to have Wenchebach with intermittent 2:1 AVB HR as low as 32 BPM. Currently SR 68 BPM.   Patient without complaints, denies palpitations, dizziness, chest pain, SOB, fever, chills, n/v/d/c.     MEDICATIONS  (STANDING):  atorvastatin 10 milliGRAM(s) Oral at bedtime  cefepime   IVPB      cefepime   IVPB 1000 milliGRAM(s) IV Intermittent every 24 hours  chlorhexidine 4% Liquid 1 Application(s) Topical <User Schedule>  DOPamine Infusion 3 MICROgram(s)/kG/Min (7.2 mL/Hr) IV Continuous <Continuous>  heparin  Infusion 8 Unit(s)/Hr (0.08 mL/Hr) IV Continuous <Continuous>  hydrALAZINE 25 milliGRAM(s) Oral three times a day  influenza   Vaccine 0.5 milliLiter(s) IntraMuscular once  insulin glargine Injectable (LANTUS) 15 Unit(s) SubCutaneous every morning  insulin lispro Injectable (HumaLOG) 5 Unit(s) SubCutaneous three times a day before meals  levothyroxine 125 MICROGram(s) Oral daily  lidocaine   Patch 1 Patch Transdermal daily  pantoprazole    Tablet 40 milliGRAM(s) Oral before breakfast  predniSONE   Tablet 20 milliGRAM(s) Oral daily  silver sulfADIAZINE 1% Cream 1 Application(s) Topical every 12 hours    MEDICATIONS  (PRN):  dextrose 40% Gel 15 Gram(s) Oral once PRN Blood Glucose LESS THAN 70 milliGRAM(s)/deciliter  glucagon  Injectable 1 milliGRAM(s) IntraMuscular once PRN Glucose LESS THAN 70 milligrams/deciliter      Allergies  No Known Allergies      REVIEW OF SYSTEMS  A ten-point review of systems was otherwise negative except as noted.      Vital Signs Last 24 Hrs  T(C): 36.7 (16 Sep 2019 14:13), Max: 36.7 (16 Sep 2019 05:37)  T(F): 98 (16 Sep 2019 14:13), Max: 98.1 (16 Sep 2019 12:00)  HR: 67 (16 Sep 2019 14:13) (53 - 78)  BP: 132/60 (16 Sep 2019 14:13) (127/59 - 174/73)  BP(mean): --  RR: 18 (16 Sep 2019 14:13) (17 - 20)  SpO2: 99% (16 Sep 2019 10:00) (99% - 99%)    09-15-19 @ 07:01  -  09-16-19 @ 07:00  --------------------------------------------------------  IN: 88.5 mL / OUT: 900 mL / NET: -811.5 mL    09-16-19 @ 07:01  -  09-16-19 @ 14:40  --------------------------------------------------------  IN: 372.4 mL / OUT: 200 mL / NET: 172.4 mL      Physical Exam    GENERAL: In no apparent distress, well nourished, and hydrated.  HEART: Regular rate and rhythm; No murmurs, rubs, or gallops.  PULMONARY: Clear to auscultation and perfusion.  No rales, wheezing, or rhonchi bilaterally.  ABDOMEN: Soft, Nontender, Nondistended; Bowel sounds present  EXTREMITIES:  B/L LE wrapped in ACE bandages. 2+ Peripheral Pulses, No clubbing, cyanosis noted  NEUROLOGICAL: Grossly nonfocal    LABS:                        10.7   12.42 )-----------( 272      ( 16 Sep 2019 05:37 )             33.7     09-16    138  |  110  |  57<H>  ----------------------------<  198<H>  5.1<H>   |  17  |  1.8<H>    Ca    8.8      16 Sep 2019 05:37  Mg     2.1     09-16    TPro  3.9<L>  /  Alb  1.9<L>  /  TBili  <0.2  /  DBili  x   /  AST  23  /  ALT  39  /  AlkPhos  179<H>  09-16    PT/INR - ( 15 Sep 2019 17:17 )   PT: 10.30 sec;   INR: 0.89 ratio         PTT - ( 16 Sep 2019 05:37 )  PTT:33.7 sec      RADIOLOGY & ADDITIONAL TESTS:  < from: Xray Chest 1 View- PORTABLE-Urgent (09.09.19 @ 21:51) >  Impression:      No radiographic evidence of acute cardiopulmonary disease.    < end of copied text >    < from: 12 Lead ECG (09.14.19 @ 23:16) >  Ventricular Rate 67 BPM    Atrial Rate 71 BPM    P-R Interval 318 ms    QRS Duration 124 ms    Q-T Interval 396 ms    QTC Calculation(Bezet) 418 ms    P Axis 52 degrees    R Axis -39 degrees    T Axis 56 degrees    Diagnosis Line Sinus rhythm with 2nd degree A-V block (Mobitz II) with ventricular escape  complexes  Left axis deviation Left anterior kira block  Non-specific intra-ventricular conduction delay  Left ventricular hypertrophy with QRS widening  Poor R wave progression  Abnormal ECG    < end of copied text >

## 2019-09-16 NOTE — PROGRESS NOTE ADULT - ASSESSMENT
83 year old female with PMH of DM II, CKD, DLD, RA, depression brought in by EMS from home for weakness, decreased ambulation, frequent falls and frequent urination. Admitted with multiple comorbidities.    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  - TSH wnl  - echo- EF 45-50  - dopamine to 1-2 mcg/kg/min to target -140  - Will also start on amlodipine 5mg for HTN due to dopamine  - PPM today (temporary-permanent),, will need permanent PPM after medical optimization.    # UTI , Positive UA with hematuria and increased urinary frequency  - Sepsis ruled out on admission, WBC 20  - CT A/P showed emphysematous cystitis  - Urology- no intervention: for new hematuria- watson irrigation  - ID- cellulitis LLE; no evidence of cholestasis in setting of dilated CBD- no acute cholecystitis or cholangitis; urine cx 9/09 NGTD; urine cx 9/10 showed E coli; wound cx showed Morganella  - Low suspicion for emphysematous cystitis, C/w Cefepime 1g IV q24h for now; could change to PO Cipro 250mg q12h for 10 more days on discharge  - US kidney bladder - no hydro, bladder not visualized    # Right common femoral DVT   - Vascular consult appreciated - heparin in hospital then Eliquis on discharge; f/u w/in 2 mos w/ vascular  - Arterial duplex negative  - Heparin held for procedure today (Temporary PPM today then possible debridement afterward).    # BALDEV on CKD 3  - Cr 12/28/2018 was 1.3.   - cr today 1.7  - improving  - Monitor Cr  - Avoid nephrotoxic agents    # Common bile duct choledocholith  - Mild transaminitis  - Surgical eval: no surgical intervention  - US abdomen: +ve cholelithiasis, no cholecystitis.   - Given no evidence of cholangitis or obstructive sx, would defer intervention in active infection and cardiac comorbidity - ERCP to be considered vs IR biliary drainage should the prior conditions develop    # Bilateral shins ulceration with erythema and discharge  - Left shin purulence - culture noted for Morganella  - s/p Ancef, Vanco and Zosyn in ED  - ID on board- silverdene topical ointment to legs.   - possible OR today after PPM placement    # Rt shoulder pain  - X ray acromioclavicular joint separation of indeterminate age with widening of the AC joint interval measuring 1 cm.   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  - care discussed with ortho- Sling and outpt follow up in 4 weeks, WBAT in RUE    # Weakness and frequent falls  - Likely due to multiple comorbidities  - Trauma workup in ED negative for any fractures  - follow up with PT/rehab after procedure    # DM II  - hgba1c 10.4,  - c/w basal and bolus insulin regimen  - Monitor Fingersticks  - Carb consistent diet    # Rheumatoid arthritis  - On Prednisone 20mg qd  - may need stress dose prior to procedure    # DLD - Continue statin    # Elder self-neglect  - poor nutritional status   - Unsafe living condition  - Social work assessment  - Needs placement    GI ppx: Protonix  DVT ppx/tx: was on heparin overnight, held for procedure today, will restart in the evening after procedure once cleared    Dispo: from home, needs placement

## 2019-09-16 NOTE — CHART NOTE - NSCHARTNOTEFT_GEN_A_CORE
Registered Dietitian Follow-Up     Patient Profile Reviewed                           Yes [x]   No []     Nutrition History Previously Obtained        Yes [x]  No []       Pertinent Subjective Information: Pt reports not being able to finish her tray because the food is too large. Pt is edentulous, needs softer food. Observed 50% of meal tray eaten at lunch. Documented po intake 50-75%.      Pertinent Medical Interventions:     Diet order:     Anthropometrics:  - Ht.  - Wt.  - %wt change  - BMI: 25.7.   - IBW: 110#+/-10%.     Pertinent Lab Data: (9/16) H/H 10.7/33.7  K 5.1  BUN 58  Cr 1.8  glucose 198      Pertinent Meds: heparin, abx, NS 50ml/h, insulin, lipitor, synthroid, cozaar, protonix, prednisone     Physical Findings:  - Appearance: alert, [place]  - GI function: LBM 9/13  - Tubes:  - Oral/Mouth cavity: edentulou s  - Skin: BS 14, 1+ generalized edema, 2+ edema b/l foot, b/l IT stage 2 pressure ulcer      Nutrition Requirements  Weight Used:     Estimated Energy Needs    Continue []  Adjust []  Adjusted Energy Recommendations:   kcal/day        Estimated Protein Needs    Continue []  Adjust []  Adjusted Protein Recommendations:   gm/day        Estimated Fluid Needs        Continue []  Adjust []  Adjusted Fluid Recommendations:   mL/day     Nutrient Intake:        [] Previous Nutrition Diagnosis:            [] Ongoing          [] Resolved    [] No active nutrition diagnosis identified at this time     Nutrition Diagnostic #1  Problem:  Etiology:  Statement:     Nutrition Diagnostic #2  Problem:  Etiology:  Statement:     Nutrition Intervention      Goal/Expected Outcome:     Indicator/Monitoring:    Recommendation: Registered Dietitian Follow-Up     Patient Profile Reviewed                           Yes [x]   No []     Nutrition History Previously Obtained        Yes [x]  No []       Pertinent Subjective Information: Pt reports not being able to finish her tray because the food is too large. Pt is edentulous, needs softer food. Observed 50% of meal tray eaten at lunch. Documented po intake 50-75%.      Pertinent Medical Interventions: Bradycardia. BALDEV on CKD 3. UTI , Positive UA with hematuria and increased urinary frequency.  Common bile duct choledocholith- Mild transaminitis: no surg intervention. Weakness and frequent falls. Elder self-neglect- Needs placement.      Diet order: CHO + renal + DASH, NPO after midnight      Anthropometrics:  - Ht.  - Wt. no new weights   - %wt change  - BMI: 25.7.   - IBW: 110#+/-10%.     Pertinent Lab Data: (9/16) H/H 10.7/33.7  K 5.1  BUN 58  Cr 1.8  glucose 198      Pertinent Meds: heparin, abx, NS 50ml/h, insulin, lipitor, synthroid, cozaar, protonix, prednisone     Physical Findings:  - Appearance: alert, [place]  - GI function: LBM 9/13  - Tubes:  - Oral/Mouth cavity: edentulou s  - Skin: BS 14, 1+ generalized edema, 2+ edema b/l foot, b/l IT stage 2 pressure ulcer      Nutrition Requirements  Weight Used: dosing  141#, needs cont from RD assessment 9/12     estimated energy needs: 1265-1580kcal ([id number]) meets criteria for PCM, pressure injuries  estimated protein needs: 64-77g (1.0-1.2g/kg) as above, accounts for BALDEV on CKD, pressure injuries stage II  estimated fluid needs: per LIP    Nutrient Intake: not meeting needs        [] Previous Nutrition Diagnosis: moderate PCM/Increased Nutrient Needs            [x] Ongoing both         [] Resolved    [] No active nutrition diagnosis identified at this time     Nutrition Diagnostic #1  Problem:  Etiology:  Statement:     Nutrition Diagnostic #2  Problem:  Etiology:  Statement:     Nutrition Intervention meal/snack,      Goal/Expected Outcome: Pt to consume >75% of meal tray in 3 day s     Indicator/Monitoring: RD to monitor energy intake, diet order, body composition, NFPF    Recommendation: Add mechanical soft diet texture to current diet order.

## 2019-09-16 NOTE — PROGRESS NOTE ADULT - ATTENDING COMMENTS
Patient seen and examined independently. I agree with the resident's note, physical exam, and plan except as below.  Vital Signs Last 24 Hrs  T(C): 36.7 (16 Sep 2019 12:00), Max: 36.7 (16 Sep 2019 05:37)  T(F): 98.1 (16 Sep 2019 12:00), Max: 98.1 (16 Sep 2019 12:00)  HR: 70 (16 Sep 2019 12:00) (53 - 78)  BP: 127/59 (16 Sep 2019 12:00) (127/59 - 174/73)  BP(mean): --  RR: 18 (16 Sep 2019 12:00) (17 - 20)  SpO2: 99% (16 Sep 2019 10:00) (99% - 99%)  PE  nad  aaox2  [place]  [id number]  ctabl  soft ntnd+bs  legs wrapped - edematous,right 2nd toe ecchymosis   +watson - clear urine  anasarcic      #frequent falls - debility - pt/rehab - needs SNF  #right shoulder cuff tear - pt unable raise arm - ortho eval done - wbat, pt - outpt folloup  < from: Xray Shoulder 2 Views, Right (09.09.19 @ 21:50) >    Findings consistent with acromioclavicular joint separation of   indeterminate age with widening of the AC joint interval measuring 1 cm.   Correlate clinically for acute symptoms.      < end of copied text >    #ecoli uti/cystitis/polymicrobial Le cellulitis - on cefepime - ID follow up for duration of tx  #hematuria - resolved - pt on Iv heparin - monitor h/h. and PTT, uro recs noted  #Right CF dvt - started on iv heparin - monitor PTT - 50-80 - if hematuria worsens may need to call vascular for filter  PT/INR - ( 15 Sep 2019 17:17 )   PT: 10.30 sec;   INR: 0.89 ratio         PTT - ( 16 Sep 2019 05:37 )  PTT:33.7 sec    heparin was held for planned procedure which is postponed for 9/17 - restart heparin gtt       PTT - ( 14 Sep 2019 05:29 )  PTT:56.3 sec  #BALDEV on CKD III - likely prerenal - monitor renal function improving        #CBD dilation - lfts wnl - asymptomatic - no signs of cholangitis - no intervention by GI due to multiple comorbid conditions   #DMII - insulin coverage -   CAPILLARY BLOOD GLUCOSE      POCT Blood Glucose.: 186 mg/dL (16 Sep 2019 10:59)  POCT Blood Glucose.: 195 mg/dL (16 Sep 2019 07:48)  POCT Blood Glucose.: 281 mg/dL (15 Sep 2019 23:42)  POCT Blood Glucose.: 336 mg/dL (15 Sep 2019 21:31)  POCT Blood Glucose.: 281 mg/dL (15 Sep 2019 16:58)      #hxof RA on prednisone - no need for stress dose steroids on dopamine    # bella 1 -  - avoid AVnodal blocking agents - cont tele - EPS eval   spoke with Dr ramos - law into 20s and sinus pauses   dopamine was increased today as recommendations from EPS  planned temp pacer for 9/17   can give norvasc for hypertension    need for ppm    #uncontrolled htn  due to dopamine    #diarrhea - on abxs - check cdiff Patient seen and examined independently. I agree with the resident's note, physical exam, and plan except as below.  Vital Signs Last 24 Hrs  T(C): 36.7 (16 Sep 2019 12:00), Max: 36.7 (16 Sep 2019 05:37)  T(F): 98.1 (16 Sep 2019 12:00), Max: 98.1 (16 Sep 2019 12:00)  HR: 70 (16 Sep 2019 12:00) (53 - 78)  BP: 127/59 (16 Sep 2019 12:00) (127/59 - 174/73)  BP(mean): --  RR: 18 (16 Sep 2019 12:00) (17 - 20)  SpO2: 99% (16 Sep 2019 10:00) (99% - 99%)  PE  nad  aaox2  [place]  [id number]  ctabl  soft ntnd+bs  legs wrapped - edematous,right 2nd toe ecchymosis   +watson - clear urine  anasarcic      #frequent falls - debility - pt/rehab - needs SNF  #right shoulder cuff tear - pt unable raise arm - ortho eval done - wbat, pt - outpt folloup  < from: Xray Shoulder 2 Views, Right (09.09.19 @ 21:50) >    Findings consistent with acromioclavicular joint separation of   indeterminate age with widening of the AC joint interval measuring 1 cm.   Correlate clinically for acute symptoms.      < end of copied text >    #ecoli uti/cystitis/polymicrobial Le cellulitis - on cefepime - ID follow up for duration of tx  #hematuria - resolved - pt on Iv heparin - monitor h/h. and PTT, uro recs noted  #Right CF dvt - started on iv heparin - monitor PTT - 50-80 - if hematuria worsens may need to call vascular for filter  PT/INR - ( 15 Sep 2019 17:17 )   PT: 10.30 sec;   INR: 0.89 ratio         PTT - ( 16 Sep 2019 05:37 )  PTT:33.7 sec    heparin was held for planned procedure which is postponed for 9/17 - restart heparin gtt       PTT - ( 14 Sep 2019 05:29 )  PTT:56.3 sec  #BALDEV on CKD III - likely prerenal - monitor renal function improving        #CBD dilation - lfts wnl - asymptomatic - no signs of cholangitis - no intervention by GI due to multiple comorbid conditions   #DMII - insulin coverage -   CAPILLARY BLOOD GLUCOSE      POCT Blood Glucose.: 186 mg/dL (16 Sep 2019 10:59)  POCT Blood Glucose.: 195 mg/dL (16 Sep 2019 07:48)  POCT Blood Glucose.: 281 mg/dL (15 Sep 2019 23:42)  POCT Blood Glucose.: 336 mg/dL (15 Sep 2019 21:31)  POCT Blood Glucose.: 281 mg/dL (15 Sep 2019 16:58)      #hxof RA on prednisone - no need for stress dose steroids on dopamine    # bella 1 -  - avoid AVnodal blocking agents - cont tele - EPS eval   spoke with Dr ramos - law into 20s and sinus pauses   dopamine was increased today as recommendations from EPS  planned temp pacer for 9/17   can give norvasc for hypertension    need for ppm    #uncontrolled htn  due to dopamine    #if any further diarrhea check cdiff

## 2019-09-16 NOTE — PROGRESS NOTE ADULT - SUBJECTIVE AND OBJECTIVE BOX
SUBJECTIVE:    Patient is a 83y old Female who presents with a chief complaint of Emphysematous cystitis (15 Sep 2019 13:24)    Currently admitted to medicine with the primary diagnosis of Emphysematous cystitis     Today is hospital day 6d.   persistent bradycardia, lowest drop to 27bpm, asymptomatic    PAST MEDICAL & SURGICAL HISTORY  Depression  Chronic kidney disease (CKD)  Dyslipidemia  Hypertension  Diabetes mellitus    ALLERGIES:  No Known Allergies    MEDICATIONS:  STANDING MEDICATIONS  amLODIPine   Tablet 5 milliGRAM(s) Oral daily  atorvastatin 10 milliGRAM(s) Oral at bedtime  atropine Injectable 0.5 milliGRAM(s) IntraMuscular once  cefepime   IVPB      cefepime   IVPB 1000 milliGRAM(s) IV Intermittent every 24 hours  chlorhexidine 4% Liquid 1 Application(s) Topical <User Schedule>  dextrose 5%. 1000 milliLiter(s) IV Continuous <Continuous>  dextrose 50% Injectable 12.5 Gram(s) IV Push once  dextrose 50% Injectable 25 Gram(s) IV Push once  dextrose 50% Injectable 25 Gram(s) IV Push once  DOPamine Infusion 2.5 MICROgram(s)/kG/Min IV Continuous <Continuous>  influenza   Vaccine 0.5 milliLiter(s) IntraMuscular once  insulin glargine Injectable (LANTUS) 15 Unit(s) SubCutaneous every morning  insulin lispro Injectable (HumaLOG) 5 Unit(s) SubCutaneous three times a day before meals  levothyroxine 125 MICROGram(s) Oral daily  lidocaine   Patch 1 Patch Transdermal daily  losartan 50 milliGRAM(s) Oral daily  pantoprazole    Tablet 40 milliGRAM(s) Oral before breakfast  predniSONE   Tablet 20 milliGRAM(s) Oral daily  silver sulfADIAZINE 1% Cream 1 Application(s) Topical every 12 hours    PRN MEDICATIONS  dextrose 40% Gel 15 Gram(s) Oral once PRN  glucagon  Injectable 1 milliGRAM(s) IntraMuscular once PRN    VITALS:   T(F): 98  HR: 64  BP: 146/65  RR: 18  SpO2: 99%    LABS:                        10.7   12.42 )-----------( 272      ( 16 Sep 2019 05:37 )             33.7     09-16    138  |  110  |  57<H>  ----------------------------<  198<H>  5.1<H>   |  17  |  1.8<H>    Ca    8.8      16 Sep 2019 05:37  Mg     2.1     09-16    TPro  3.9<L>  /  Alb  1.9<L>  /  TBili  <0.2  /  DBili  x   /  AST  23  /  ALT  39  /  AlkPhos  179<H>  09-16    PT/INR - ( 15 Sep 2019 17:17 )   PT: 10.30 sec;   INR: 0.89 ratio      PTT - ( 16 Sep 2019 05:37 )  PTT:33.7 sec      PHYSICAL EXAM:  GEN: No acute distress  LUNGS: Clear to auscultation bilaterally   HEART: bradycardic 40s  ABD: Soft, non-tender, non-distended.  EXT: edema+, b/l LE wrapped.  NEURO: AAOX2    Intravenous access:   Morris Catheter:   Indwelling Urethral Catheter:     Connect To:  Straight Drainage/Gravity    Indication:  Urine Output Monitoring in Critically Ill (09-11-19 @ 04:33) (not performed) [active]

## 2019-09-17 NOTE — PROGRESS NOTE ADULT - ASSESSMENT
83 year old female with PMH of DM II, CKD, DLD, RA, depression brought in by EMS from home for weakness, decreased ambulation, frequent falls and frequent urination. Admitted with multiple comorbidities.    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  - TSH wnl  - echo- EF 45-50  - Dopamine to 3 mcg/kg/min to target -140  - PPM today (temporary-permanent), will need permanent PPM after medical optimization and cleared off of infection  - patient already in EP lab    # UTI , Positive UA with hematuria and increased urinary frequency  - Sepsis ruled out on admission, WBC 20  - CT A/P showed emphysematous cystitis  - Urology- no intervention: for new hematuria- watson irrigation  - ID- cellulitis LLE; no evidence of cholestasis in setting of dilated CBD- no acute cholecystitis or cholangitis; urine cx 9/09 NGTD; urine cx 9/10 showed E coli  - C/w Cefepime 1g IV q24h for now; could change to PO Cipro 250mg q12h for 10 more days on discharge  - US kidney bladder - no hydro, bladder not visualized    # Right common femoral DVT   - Vascular consult appreciated - heparin in hospital then Eliquis on discharge; f/u w/in 2 mos w/ vascular  - Arterial duplex negative  - Heparin held for procedure today (Temporary PPM today then possible debridement afterward).    # BALDEV on CKD 3  - Cr 12/28/2018 was 1.3.   - cr today 1.9  - Monitor Cr  - Avoid nephrotoxic agents    # Common bile duct choledocholith  - Surgical eval: no surgical intervention  - US abdomen: +ve cholelithiasis, no cholecystitis.   - Given no evidence of cholangitis or obstructive sx, would defer intervention in active infection and cardiac comorbidity - ERCP to be considered vs IR biliary drainage should the prior conditions develop    # Bilateral shins ulceration with erythema and discharge  - Left shin purulence - culture noted for Morganella  - s/p Ancef, Vanco and Zosyn in ED  - OR today after PPM placement   - Burn following    # Rt shoulder pain  - X ray acromioclavicular joint separation of indeterminate age with widening of the AC joint interval measuring 1 cm.   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  - care discussed with ortho- Sling and outpt follow up in 4 weeks, WBAT in RUE    # Weakness and frequent falls  - Likely due to multiple comorbidities  - Trauma workup in ED negative for any fractures  - follow up with PT/rehab after procedure    # DM II  - hgba1c 10.4,  - c/w basal and bolus insulin regimen  - Monitor Fingersticks  - Carb consistent diet    # Rheumatoid arthritis  - On Prednisone 20mg qd  - no need for stress dosing for the procedure    # DLD - Continue statin    # Elder self-neglect  - poor nutritional status   - Unsafe living condition  - Social work assessment  - Needs placement    GI ppx: Protonix  DVT ppx/tx: heparin once procedure done and cleared by Burn.    Dispo: from home, needs placement

## 2019-09-17 NOTE — PROGRESS NOTE ADULT - SUBJECTIVE AND OBJECTIVE BOX
SUBJECTIVE:    Patient is a 83y old Female who presents with a chief complaint of HEMATURIA. (16 Sep 2019 14:40)    Currently admitted to medicine with the primary diagnosis of Emphysematous cystitis, AV block, LE cellulitis     Today is hospital day 7d.   This morning she is resting comfortably in bed and reports no new issues or overnight events.     PAST MEDICAL & SURGICAL HISTORY  Depression  Chronic kidney disease (CKD)  Dyslipidemia  Hypertension  Diabetes mellitus    ALLERGIES:  No Known Allergies    MEDICATIONS:  STANDING MEDICATIONS  atorvastatin 10 milliGRAM(s) Oral at bedtime  cefepime   IVPB      cefepime   IVPB 1000 milliGRAM(s) IV Intermittent every 24 hours  chlorhexidine 4% Liquid 1 Application(s) Topical <User Schedule>  dextrose 5%. 1000 milliLiter(s) IV Continuous <Continuous>  dextrose 50% Injectable 12.5 Gram(s) IV Push once  dextrose 50% Injectable 25 Gram(s) IV Push once  dextrose 50% Injectable 25 Gram(s) IV Push once  DOPamine Infusion 3 MICROgram(s)/kG/Min IV Continuous <Continuous>  hydrALAZINE 25 milliGRAM(s) Oral three times a day  influenza   Vaccine 0.5 milliLiter(s) IntraMuscular once  insulin glargine Injectable (LANTUS) 15 Unit(s) SubCutaneous every morning  insulin lispro Injectable (HumaLOG) 5 Unit(s) SubCutaneous three times a day before meals  levothyroxine 125 MICROGram(s) Oral daily  lidocaine   Patch 1 Patch Transdermal daily  NIFEdipine XL 30 milliGRAM(s) Oral daily  pantoprazole    Tablet 40 milliGRAM(s) Oral before breakfast  predniSONE   Tablet 20 milliGRAM(s) Oral daily  silver sulfADIAZINE 1% Cream 1 Application(s) Topical every 12 hours  vancomycin  IVPB 500 milliGRAM(s) IV Intermittent once    PRN MEDICATIONS  dextrose 40% Gel 15 Gram(s) Oral once PRN  glucagon  Injectable 1 milliGRAM(s) IntraMuscular once PRN    VITALS:   T(F): 98  HR: 35  BP: 127/89  RR: 18  SpO2: 99%    LABS:                        10.7   12.23 )-----------( 289      ( 17 Sep 2019 05:58 )             33.5     09-17    138  |  111<H>  |  56<H>  ----------------------------<  224<H>  5.4<H>   |  17  |  1.9<H>    Ca    9.1      17 Sep 2019 05:58  Mg     2.2     09-17    TPro  4.1<L>  /  Alb  2.0<L>  /  TBili  <0.2  /  DBili  x   /  AST  32  /  ALT  51<H>  /  AlkPhos  198<H>  09-17    PT/INR - ( 15 Sep 2019 17:17 )   PT: 10.30 sec;   INR: 0.89 ratio         PTT - ( 17 Sep 2019 05:58 )  PTT:31.2 sec      PHYSICAL EXAM:  GEN: No acute distress  LUNGS: Clear to auscultation bilaterally   HEART: BRADYCARDIC, S1S2 HEARD  ABD: Soft, non-tender, non-distended, bs+  EXT: edematous+, wrapped, moves all extremities  NEURO: AAOX3    Morris Catheter:   Indwelling Urethral Catheter:     Connect To:  Straight Drainage/Gravity    Indication:  Urine Output Monitoring in Critically Ill (09-11-19 @ 04:33) (not performed) [active]

## 2019-09-17 NOTE — PROGRESS NOTE ADULT - ATTENDING COMMENTS
Patient seen and examined independently. I agree with the resident's note, physical exam, and plan except as below.  Vital Signs Last 24 Hrs  T(C): 36.8 (17 Sep 2019 13:33), Max: 36.8 (17 Sep 2019 13:33)  T(F): 98.2 (17 Sep 2019 13:33), Max: 98.2 (17 Sep 2019 13:33)  HR: 70 (17 Sep 2019 13:33) (35 - 70)  BP: 135/60 (17 Sep 2019 13:33) (126/60 - 153/69)  BP(mean): --  RR: 18 (17 Sep 2019 13:33) (16 - 18)  SpO2: 97% (17 Sep 2019 11:45) (97% - 99%)  Pe  nad  aaox2  [place]  [id number]  right chest external ppm with wires under tegaderm  ctabl  soft ntnd+bs  legs wrapped - edematous,right 2nd toe ecchymosis   +watson - clear urine  anasarcic      #frequent falls - debility - pt/rehab - needs SNF  #right shoulder cuff tear - pt unable raise arm - ortho eval done - wbat, pt - outpt folloup  < from: Xray Shoulder 2 Views, Right (09.09.19 @ 21:50) >    Findings consistent with acromioclavicular joint separation of   indeterminate age with widening of the AC joint interval measuring 1 cm.   Correlate clinically for acute symptoms.      < end of copied text >    #ecoli uti/cystitis/polymicrobial Le cellulitis - on cefepime - ID - can change to po after all surgical procedures done   #hematuria - resolved   #Right CF dvt - started on iv heparin - monitor PTT - 50-80 - if hematuria worsens may need to call vascular for filter - restart after OR now  PT/INR - ( 15 Sep 2019 17:17 )   PT: 10.30 sec;   INR: 0.89 ratio         PTT - ( 17 Sep 2019 05:58 )  PTT:31.2 sec       PTT - ( 14 Sep 2019 05:29 )  PTT:56.3 sec  #BALDEV on CKD III - likely prerenal - monitor renal function improving - stable        #CBD dilation - lfts wnl - asymptomatic - no signs of cholangitis - no intervention by GI due to multiple comorbid conditions   #DMII - insulin coverage -   CAPILLARY BLOOD GLUCOSE      POCT Blood Glucose.: 227 mg/dL (17 Sep 2019 11:48)  POCT Blood Glucose.: 203 mg/dL (17 Sep 2019 10:33)  POCT Blood Glucose.: 187 mg/dL (17 Sep 2019 08:19)  POCT Blood Glucose.: 199 mg/dL (17 Sep 2019 07:06)  POCT Blood Glucose.: 357 mg/dL (16 Sep 2019 21:17)  POCT Blood Glucose.: 319 mg/dL (16 Sep 2019 16:49)          #hxof RA on prednisone - no need for stress dose steroids on dopamine    # bella 1 -  - avoid AVnodal blocking agents - cont tele - EPS eval   spoke with Dr ramos - law into 20s and sinus pauses   sp temp perm external pacer  called Id Dr clark for clearance for permanent PPM  EPS follow up for plan of PPM  dc dopamine  spoke with eps NP -- follow up on offical recs    #LE cellulitis/ulceration - for burn debridement tomorrow     spoke with son at bedside, resident

## 2019-09-17 NOTE — CHART NOTE - NSCHARTNOTEFT_GEN_A_CORE
Electrophysiology Brief Post-Operative Note    I have personally seen and examined the patient.  I agree with the history and physical which I have reviewed and noted any changes below.  09-17-19 @ 8:09    PRE-OP DIAGNOSIS: intermittent complete AV block    POST-OP DIAGNOSIS: intermittent complete AV block    PROCEDURE:  Placement of a right ventricular temporary-permanent pacemaker lead through right IJ, attached to an external pacemaker device.    Physician: ROBERTH Segundo MD  Assistant: None    ESTIMATED BLOOD LOSS: 10  mL    ANESTHESIA TYPE:  [  ]General Anesthesia  [X] Sedation  [X] Local/Regional    CONDITION  [  ] Critical  [  ] Serious  [  ]Fair  [X]Good      SPECIMENS REMOVED (IF APPLICABLE): None      IMPLANTS (IF APPLICABLE)  Single Chamber Pacemaker implant        FINDINGS  No immediate complications  Contrast used: none      PLAN OF CARE  - Chest X-ray portable now  - Device interrogation tomorrow in am

## 2019-09-18 NOTE — PROGRESS NOTE ADULT - SUBJECTIVE AND OBJECTIVE BOX
INTERVAL HPI/OVERNIGHT EVENTS:  Pt is s/p RIJ temporary-permanent pacemaker implant.  She went to OR today for debridement.    MEDICATIONS  (STANDING):  atorvastatin 10 milliGRAM(s) Oral at bedtime  cefepime   IVPB 1000 milliGRAM(s) IV Intermittent every 24 hours  chlorhexidine 4% Liquid 1 Application(s) Topical <User Schedule>  dextrose 5%. 1000 milliLiter(s) (50 mL/Hr) IV Continuous <Continuous>  dextrose 50% Injectable 12.5 Gram(s) IV Push once  dextrose 50% Injectable 25 Gram(s) IV Push once  dextrose 50% Injectable 25 Gram(s) IV Push once  heparin  Infusion 800 Unit(s)/Hr (8 mL/Hr) IV Continuous <Continuous>  hydrALAZINE 25 milliGRAM(s) Oral three times a day  influenza   Vaccine 0.5 milliLiter(s) IntraMuscular once  insulin glargine Injectable (LANTUS) 15 Unit(s) SubCutaneous every morning  insulin lispro Injectable (HumaLOG) 5 Unit(s) SubCutaneous three times a day before meals  lactated ringers. 1000 milliLiter(s) (75 mL/Hr) IV Continuous <Continuous>  levothyroxine 125 MICROGram(s) Oral daily  lidocaine   Patch 1 Patch Transdermal daily  NIFEdipine XL 30 milliGRAM(s) Oral daily  pantoprazole    Tablet 40 milliGRAM(s) Oral before breakfast  predniSONE   Tablet 20 milliGRAM(s) Oral daily  sodium chloride 0.9%. 1000 milliLiter(s) (50 mL/Hr) IV Continuous <Continuous>  vancomycin  IVPB 500 milliGRAM(s) IV Intermittent once    MEDICATIONS  (PRN):  dextrose 40% Gel 15 Gram(s) Oral once PRN Blood Glucose LESS THAN 70 milliGRAM(s)/deciliter  glucagon  Injectable 1 milliGRAM(s) IntraMuscular once PRN Glucose LESS THAN 70 milligrams/deciliter  HYDROmorphone  Injectable 0.5 milliGRAM(s) IV Push every 10 minutes PRN Moderate Pain (4 - 6)  HYDROmorphone  Injectable 1 milliGRAM(s) IV Push every 10 minutes PRN Severe Pain (7 - 10)      Allergies    No Known Allergies    Intolerances        REVIEW OF SYSTEMS    10 point ROS negative    Vital Signs Last 24 Hrs  T(C): 36.5 (18 Sep 2019 13:25), Max: 36.7 (18 Sep 2019 05:39)  T(F): 97.7 (18 Sep 2019 13:25), Max: 98 (18 Sep 2019 05:39)  HR: 78 (18 Sep 2019 13:55) (60 - 78)  BP: 143/48 (18 Sep 2019 13:55) (110/45 - 164/61)  BP(mean): --  RR: 20 (18 Sep 2019 13:55) (12 - 20)  SpO2: 97% (18 Sep 2019 13:55) (97% - 97%)    09-17-19 @ 07:01  -  09-18-19 @ 07:00  --------------------------------------------------------  IN: 220 mL / OUT: 1100 mL / NET: -880 mL    Physical Exam    GENERAL: In no apparent distress, well nourished, and hydrated.  NECK: Supple. RIJ Temp-Permanent pacemaker  HEART: Regular rate and rhythm; No murmurs, rubs, or gallops.  PULMONARY: Clear to auscultation and perfusion.  No rales, wheezing, or rhonchi bilaterally.  ABDOMEN: Soft, Nontender, Nondistended; Bowel sounds present  EXTREMITIES:  2+ Peripheral Pulses  NEUROLOGICAL: Grossly nonfocal    LABS:                        11.5   10.10 )-----------( 308      ( 18 Sep 2019 05:49 )             36.1     09-18    137  |  112<H>  |  57<H>  ----------------------------<  213<H>  5.2<H>   |  15<L>  |  1.9<H>    Ca    9.2      18 Sep 2019 10:30  Mg     2.2     09-17    TPro  4.1<L>  /  Alb  2.0<L>  /  TBili  <0.2  /  DBili  x   /  AST  32  /  ALT  51<H>  /  AlkPhos  198<H>  09-17    PTT - ( 18 Sep 2019 05:49 )  PTT:27.0 sec      RADIOLOGY & ADDITIONAL TESTS:  CXR pending

## 2019-09-18 NOTE — PROGRESS NOTE ADULT - SUBJECTIVE AND OBJECTIVE BOX
ANGELFREDI  83y, Female      OVERNIGHT EVENTS:    no fevers, no pressors, alert, NAD, feels well and has no complaints  no diarrhea, no pain LE  no abdominal pain  watson in place    VITALS:  T(F): 98, Max: 98.2 (09-17-19 @ 13:33)  HR: 60  BP: 164/61  RR: 18Vital Signs Last 24 Hrs  T(C): 36.7 (18 Sep 2019 05:55), Max: 36.8 (17 Sep 2019 13:33)  T(F): 98 (18 Sep 2019 05:55), Max: 98.2 (17 Sep 2019 13:33)  HR: 60 (18 Sep 2019 05:55) (35 - 70)  BP: 164/61 (18 Sep 2019 05:55) (127/89 - 164/61)  BP(mean): --  RR: 18 (18 Sep 2019 05:55) (16 - 18)  SpO2: 97% (17 Sep 2019 11:45) (97% - 99%)    TESTS & MEASUREMENTS:                        10.7   12.23 )-----------( 289      ( 17 Sep 2019 05:58 )             33.5     09-17    138  |  111<H>  |  56<H>  ----------------------------<  224<H>  5.4<H>   |  17  |  1.9<H>    Ca    9.1      17 Sep 2019 05:58  Mg     2.2     09-17    TPro  4.1<L>  /  Alb  2.0<L>  /  TBili  <0.2  /  DBili  x   /  AST  32  /  ALT  51<H>  /  AlkPhos  198<H>  09-17    LIVER FUNCTIONS - ( 17 Sep 2019 05:58 )  Alb: 2.0 g/dL / Pro: 4.1 g/dL / ALK PHOS: 198 U/L / ALT: 51 U/L / AST: 32 U/L / GGT: x             Culture - Blood (collected 09-13-19 @ 04:00)  Source: .Blood Blood  Preliminary Report (09-14-19 @ 14:02):    No growth to date.    Culture - Abscess with Gram Stain (collected 09-11-19 @ 16:10)  Source: .Abscess Leg - Right  Final Report (09-16-19 @ 18:13):    Rare Pseudomonas aeruginosa    Few Streptococcus agalactiae (Group B) isolated    Group B streptococci are susceptible to ampicillin,    penicillin and cefazolin, but may be resistant to    erythromycin and clindamycin.    Recommendations for intrapartum prophylaxis for Group B    streptococci are penicillin or ampicillin.  Organism: Pseudomonas aeruginosa (09-16-19 @ 18:13)  Organism: Pseudomonas aeruginosa (09-16-19 @ 18:13)      -  Amikacin: S <=16      -  Aztreonam: S <=4      -  Cefepime: S <=2      -  Ceftazidime: S <=1      -  Ciprofloxacin: S <=1      -  Gentamicin: S <=2      -  Imipenem: S 2      -  Levofloxacin: S <=2      -  Meropenem: S <=1      -  Piperacillin/Tazobactam: S <=8      -  Tobramycin: S <=2      Method Type: AIRAM            RADIOLOGY & ADDITIONAL TESTS:    ANTIBIOTICS:  cefepime   IVPB      cefepime   IVPB 1000 milliGRAM(s) IV Intermittent every 24 hours  vancomycin  IVPB 500 milliGRAM(s) IV Intermittent once

## 2019-09-18 NOTE — PROGRESS NOTE ADULT - ASSESSMENT
83 year old female with PMH of DM II, anemia, CKD, DLD, RA, depression brought in by EMS from home for weakness, decreased ambulation, frequent falls and frequent urination. Found to have Wenckebach  block and periods of 2:1 block. TSH 3.75, EF 45-50% with mild to mod TR/TX. Continues to drop HR to 30s, asymptomatic.     Wenckebach  block and periods of 2:1 block  Bradycardic to 30s when asleep    - Pt is s/p temporary/permanent PM   - pt needs CXR (portable) - ordered   - will do interrogation of device  - Plan for PPM once pt is no longer infected - please ask ID when pt would be cleared for implanted device  - Will need w/u as OP for KP 83 year old female with PMH of DM II, anemia, CKD, DLD, RA, depression brought in by EMS from home for weakness, decreased ambulation, frequent falls and frequent urination. Found to have Wenckebach  block and periods of 2:1 block. TSH 3.75, EF 45-50% with mild to mod TR/MI. Continues to drop HR to 30s, asymptomatic.     Wenckebach  block and periods of 2:1 block  Bradycardic to 30s when asleep    - Pt is s/p temporary/permanent PM   - pt needs CXR (portable) - ordered   - interrogation of device was done - no issues  - Plan for PPM once pt no longer requires further leg debridements and once she is off antibiotics.

## 2019-09-18 NOTE — PRE-OP CHECKLIST - BP NONINVASIVE SYSTOLIC (MM HG)
0946 patient arrived from IR s/p aortagram with bilateral LE  Runoff. Patient awake. Right groin access site dressing clean dry intact. Patient denies pain. Vss.   1000 patient provided with water tolerating well.   1130 Dr duron at the bedside talking to patient.   1200 Criteria met to discharge patient home.   1208 discharge instructions given to patient. Patient verbalize understanding. Reviewed activity, medications, worsening symptoms, follow up.   1215 patient escorted via w/c with all his personal belongings.   
166
164
127

## 2019-09-18 NOTE — PROGRESS NOTE ADULT - GUM GEN PE MLT EXAM PC
Normal genitalia; no lesions; no discharge
detailed exam
Normal genitalia; no lesions; no discharge
Normal genitalia; no lesions; no discharge

## 2019-09-18 NOTE — PROGRESS NOTE ADULT - ATTENDING COMMENTS
#2:1 av block, wenckebach, s/p TVP  f/u eps for ppm  repeat cxr, interrogation per eps  #Cellulitis, LLE, morganella on wcx  cefepime plan to end 9/23  s/p debridement today  wound care, f/u burn  #UTI, emphysematous, ecoli  cefepime as above  #RLE DVT  hep gtt  change to eliquis s/p PPM    #Progress Note Handoff:  Pending (specify):  Consults_________, Tests________, Test Results_______, Other____f/u eps_____  Family discussion:d/w pt and son at bedside re: primary diagnosis, plan of care  Disposition: Home___/SNF___/Other________/Unknown at this time____x____

## 2019-09-18 NOTE — PROGRESS NOTE ADULT - ASSESSMENT
83 year old female with PMH of DM II, CKD, DLD, RA, depression brought in by EMS from home for weakness, decreased ambulation, frequent falls and frequent urination. Admitted with multiple comorbidities.    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    # UTI , Positive UA with hematuria and increased urinary frequency  on cefepime now  could change to cipro on  DC    # Right common femoral DVT   - Vascular consult appreciated - heparin in hospital then Eliquis on discharge; f/u w/in 2 mos w/ vascular  - Heparin for Ac until permanent PPM is placed    # BALDEV on CKD 3  creatinine stable at 1.7-1.9    # Common bile duct choledocholith  - no surgical intervention  - US abdomen: +ve cholelithiasis, no cholecystitis.   - Given no evidence of cholangitis or obstructive sx, would defer intervention in active infection and cardiac comorbidity - ERCP to be considered vs IR biliary drainage should the prior conditions develop    # Bilateral shins ulceration with erythema and discharge  - Left shin purulence - culture noted for Morganella  - OR today  - Burn following    # Rt shoulder pain  - X ray acromioclavicular joint separation of indeterminate age with widening of the AC joint interval measuring 1 cm.   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  - care discussed with ortho- Sling and outpt follow up in 4 weeks, WBAT in RUE    # Weakness and frequent falls  - Likely due to multiple comorbidities  - Trauma workup in ED negative for any fractures  - follow up with PT/rehab after procedure    # DM II  - hgba1c 10.4,  - c/w basal and bolus insulin regimen  - Monitor Fingersticks  - Carb consistent diet    # Rheumatoid arthritis  - On Prednisone 20mg qd  - no need for stress dosing for the procedure    # DLD - Continue statin    # Elder self-neglect  - poor nutritional status   - Unsafe living condition  - Social work assessment  - Needs placement    GI ppx: Protonix    DVT ppx/tx: heparin once procedure done and cleared by Burn.    Dispo: from home, needs placement

## 2019-09-18 NOTE — CHART NOTE - NSCHARTNOTEFT_GEN_A_CORE
PACU ANESTHESIA ADMISSION NOTE      Procedure:   Post op diagnosis:      ____  Intubated  TV:______       Rate: ______      FiO2: ______    __x__  Patent Airway    __x__  Full return of protective reflexes    __x__  Full recovery from anesthesia / back to baseline status    Vitals:  T(C): 36.7 (09-18-19 @ 05:55), Max: 36.8 (09-17-19 @ 13:33)  HR: 60 (09-18-19 @ 05:55) (60 - 70)  BP: 164/61 (09-18-19 @ 05:55) (135/60 - 164/61)  RR: 18 (09-18-19 @ 05:55) (18 - 18)  SpO2: --    Mental Status:  __x__ Awake   ___x__ Alert   _____ Drowsy   _____ Sedated    Nausea/Vomiting:  __x__ NO  ______Yes,   See Post - Op Orders          Pain Scale (0-10):  __0___    Treatment: ____ None    __x__ See Post - Op/PCA Orders    Post - Operative Fluids:   ____ Oral   __x__ See Post - Op Orders    Plan: Discharge:   ____Home       ___x__Floor     _____Critical Care    _____  Other:_________________    Comments: Patient had smooth intraoperative event, no anesthesia complication.  PACU Vital signs: HR:  62          BP: 135       /   45       RR:  16           O2 Sat:  97     %     Temp 36

## 2019-09-18 NOTE — PROGRESS NOTE ADULT - ASSESSMENT
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 no acute cholecystitis/ cholangitis.  CBD significantly dilated  BCx 9/9, 13  NG  BCx 9/17 E coli  UCx 9/10 E coli  Doppler 9/9 right common femoral vein DVT    RECOMMENDATIONS  Ampicillin 2 gm iv q6h

## 2019-09-18 NOTE — BRIEF OPERATIVE NOTE - NSICDXBRIEFPROCEDURE_GEN_ALL_CORE_FT
PROCEDURES:  Selective debridement of wound 18-Sep-2019 13:36:12 excisional debridement bilateral lower legs Derek Pompa

## 2019-09-18 NOTE — PROGRESS NOTE ADULT - SUBJECTIVE AND OBJECTIVE BOX
SUBJECTIVE:    Patient is a 83y old Female who presents with a chief complaint of Emphysematous cystitis  AV block  LE cellulitis (17 Sep 2019 09:46)    Currently admitted to medicine with the primary diagnosis of Emphysematous cystitis     Today is hospital day 8d. This morning she is resting comfortably in bed and reports no new issues or overnight events.     PAST MEDICAL & SURGICAL HISTORY  Depression  Chronic kidney disease (CKD)  Dyslipidemia  Hypertension  Diabetes mellitus    SOCIAL HISTORY:  Negative for smoking/alcohol/drug use.     ALLERGIES:  No Known Allergies    MEDICATIONS:  STANDING MEDICATIONS  influenza   Vaccine 0.5 milliLiter(s) IntraMuscular once  sodium chloride 0.9%. 1000 milliLiter(s) IV Continuous <Continuous>  vancomycin  IVPB 500 milliGRAM(s) IV Intermittent once    PRN MEDICATIONS  HYDROmorphone  Injectable 0.5 milliGRAM(s) IV Push every 10 minutes PRN  HYDROmorphone  Injectable 1 milliGRAM(s) IV Push every 10 minutes PRN    VITALS:   T(F): 98  HR: 60  BP: 164/61  RR: 18  SpO2: --    LABS:                        11.5   10.10 )-----------( 308      ( 18 Sep 2019 05:49 )             36.1     09-18    137  |  112<H>  |  57<H>  ----------------------------<  213<H>  5.2<H>   |  15<L>  |  1.9<H>    Ca    9.2      18 Sep 2019 10:30  Mg     2.2     09-17    TPro  4.1<L>  /  Alb  2.0<L>  /  TBili  <0.2  /  DBili  x   /  AST  32  /  ALT  51<H>  /  AlkPhos  198<H>  09-17    PTT - ( 18 Sep 2019 05:49 )  PTT:27.0 sec    PHYSICAL EXAM:  GEN: No acute distress  LUNGS: Clear to auscultation bilaterally   HEART: Regular  ABD: Soft, non-tender, non-distended, BS+  EXT: edematous, wrapped  NEURO: AAOX3 SUBJECTIVE:    Patient is a 83y old Female who presents with a chief complaint of Emphysematous cystitis  AV block  LE cellulitis (17 Sep 2019 09:46)    Currently admitted to medicine with the primary diagnosis of Emphysematous cystitis     Today is hospital day 8d. This morning she is resting comfortably in bed and reports no new issues or overnight events.   no cp, sob, abd pain, fever  no LE pain, numbness, paresthesia, weakness    PAST MEDICAL & SURGICAL HISTORY  Depression  Chronic kidney disease (CKD)  Dyslipidemia  Hypertension  Diabetes mellitus    SOCIAL HISTORY:  Negative for smoking/alcohol/drug use.     ALLERGIES:  No Known Allergies    MEDICATIONS:  STANDING MEDICATIONS  influenza   Vaccine 0.5 milliLiter(s) IntraMuscular once  sodium chloride 0.9%. 1000 milliLiter(s) IV Continuous <Continuous>  vancomycin  IVPB 500 milliGRAM(s) IV Intermittent once    PRN MEDICATIONS  HYDROmorphone  Injectable 0.5 milliGRAM(s) IV Push every 10 minutes PRN  HYDROmorphone  Injectable 1 milliGRAM(s) IV Push every 10 minutes PRN    VITALS:   T(F): 98  HR: 60  BP: 164/61  RR: 18  SpO2: --    LABS:                        11.5   10.10 )-----------( 308      ( 18 Sep 2019 05:49 )             36.1     09-18    137  |  112<H>  |  57<H>  ----------------------------<  213<H>  5.2<H>   |  15<L>  |  1.9<H>    Ca    9.2      18 Sep 2019 10:30  Mg     2.2     09-17    TPro  4.1<L>  /  Alb  2.0<L>  /  TBili  <0.2  /  DBili  x   /  AST  32  /  ALT  51<H>  /  AlkPhos  198<H>  09-17    PTT - ( 18 Sep 2019 05:49 )  PTT:27.0 sec    PHYSICAL EXAM:  GEN: No acute distress  LUNGS: Clear to auscultation bilaterally   HEART: Regular  ABD: Soft, non-tender, non-distended, BS+  EXT: edematous, wrapped  NEURO: AAOX3

## 2019-09-19 NOTE — PROGRESS NOTE ADULT - SUBJECTIVE AND OBJECTIVE BOX
Patient is a 83y old  Female who presents with a chief complaint of Emphysematous cystitis (19 Sep 2019 06:42)      SUBJECTIVE / OVERNIGHT EVENTS:  no    MEDICATIONS  (STANDING):  atorvastatin 10 milliGRAM(s) Oral at bedtime  cefepime   IVPB 1000 milliGRAM(s) IV Intermittent every 24 hours  chlorhexidine 4% Liquid 1 Application(s) Topical <User Schedule>  dextrose 5%. 1000 milliLiter(s) (50 mL/Hr) IV Continuous <Continuous>  dextrose 50% Injectable 12.5 Gram(s) IV Push once  dextrose 50% Injectable 25 Gram(s) IV Push once  dextrose 50% Injectable 25 Gram(s) IV Push once  heparin  Infusion 900 Unit(s)/Hr (9 mL/Hr) IV Continuous <Continuous>  hydrALAZINE 25 milliGRAM(s) Oral three times a day  influenza   Vaccine 0.5 milliLiter(s) IntraMuscular once  insulin glargine Injectable (LANTUS) 15 Unit(s) SubCutaneous every morning  insulin lispro Injectable (HumaLOG) 5 Unit(s) SubCutaneous three times a day before meals  levothyroxine 125 MICROGram(s) Oral daily  lidocaine   Patch 1 Patch Transdermal daily  NIFEdipine XL 30 milliGRAM(s) Oral daily  pantoprazole    Tablet 40 milliGRAM(s) Oral before breakfast  predniSONE   Tablet 20 milliGRAM(s) Oral daily  vancomycin  IVPB 500 milliGRAM(s) IV Intermittent once    MEDICATIONS  (PRN):  acetaminophen   Tablet .. 650 milliGRAM(s) Oral every 6 hours PRN Mild Pain (1 - 3)  dextrose 40% Gel 15 Gram(s) Oral once PRN Blood Glucose LESS THAN 70 milliGRAM(s)/deciliter  glucagon  Injectable 1 milliGRAM(s) IntraMuscular once PRN Glucose LESS THAN 70 milligrams/deciliter        CAPILLARY BLOOD GLUCOSE      POCT Blood Glucose.: 376 mg/dL (19 Sep 2019 12:26)  POCT Blood Glucose.: 396 mg/dL (19 Sep 2019 10:42)  POCT Blood Glucose.: 264 mg/dL (19 Sep 2019 07:52)  POCT Blood Glucose.: 248 mg/dL (18 Sep 2019 21:21)  POCT Blood Glucose.: 208 mg/dL (18 Sep 2019 16:38)    I&O's Summary    18 Sep 2019 07:01  -  19 Sep 2019 07:00  --------------------------------------------------------  IN: 50 mL / OUT: 450 mL / NET: -400 mL        PHYSICAL EXAM:  GENERAL: nad, resting comfortably in bed  EYES:  NECK:   CHEST/LUNG: ctab no w/r/r  HEART: rrr no m/g/r  ABDOMEN: soft nt nd  EXTREMITIES:  bandaged bl le  PSYCH:   NEUROLOGY:   SKIN:    LABS:                        12.0   11.73 )-----------( 307      ( 19 Sep 2019 06:20 )             37.7     09-19    134<L>  |  104  |  59<H>  ----------------------------<  422<H>  5.4<H>   |  14<L>  |  2.1<H>    Ca    9.3      19 Sep 2019 12:10  Phos  3.7     09-18  Mg     2.1     09-18    TPro  4.9<L>  /  Alb  2.4<L>  /  TBili  0.3  /  DBili  x   /  AST  22  /  ALT  46<H>  /  AlkPhos  224<H>  09-19    PTT - ( 19 Sep 2019 06:20 )  PTT:66.8 sec          RADIOLOGY & ADDITIONAL TESTS:    Imaging Personally Reviewed:  Yes    Consultant(s) Notes Reviewed:      Care Discussed with Consultants/Other Providers:    Assessment and Plan   PAST MEDICAL & SURGICAL HISTORY:  Depression  Chronic kidney disease (CKD)  Dyslipidemia  Hypertension  Diabetes mellitus Patient is a 83y old  Female who presents with a chief complaint of Emphysematous cystitis (19 Sep 2019 06:42)      SUBJECTIVE / OVERNIGHT EVENTS:  no cp, sob, abd pain, fever  +L LE pain, nonradiating, sharp, worsen to touch    MEDICATIONS  (STANDING):  atorvastatin 10 milliGRAM(s) Oral at bedtime  cefepime   IVPB 1000 milliGRAM(s) IV Intermittent every 24 hours  chlorhexidine 4% Liquid 1 Application(s) Topical <User Schedule>  dextrose 5%. 1000 milliLiter(s) (50 mL/Hr) IV Continuous <Continuous>  dextrose 50% Injectable 12.5 Gram(s) IV Push once  dextrose 50% Injectable 25 Gram(s) IV Push once  dextrose 50% Injectable 25 Gram(s) IV Push once  heparin  Infusion 900 Unit(s)/Hr (9 mL/Hr) IV Continuous <Continuous>  hydrALAZINE 25 milliGRAM(s) Oral three times a day  influenza   Vaccine 0.5 milliLiter(s) IntraMuscular once  insulin glargine Injectable (LANTUS) 15 Unit(s) SubCutaneous every morning  insulin lispro Injectable (HumaLOG) 5 Unit(s) SubCutaneous three times a day before meals  levothyroxine 125 MICROGram(s) Oral daily  lidocaine   Patch 1 Patch Transdermal daily  NIFEdipine XL 30 milliGRAM(s) Oral daily  pantoprazole    Tablet 40 milliGRAM(s) Oral before breakfast  predniSONE   Tablet 20 milliGRAM(s) Oral daily  vancomycin  IVPB 500 milliGRAM(s) IV Intermittent once    MEDICATIONS  (PRN):  acetaminophen   Tablet .. 650 milliGRAM(s) Oral every 6 hours PRN Mild Pain (1 - 3)  dextrose 40% Gel 15 Gram(s) Oral once PRN Blood Glucose LESS THAN 70 milliGRAM(s)/deciliter  glucagon  Injectable 1 milliGRAM(s) IntraMuscular once PRN Glucose LESS THAN 70 milligrams/deciliter        CAPILLARY BLOOD GLUCOSE      POCT Blood Glucose.: 376 mg/dL (19 Sep 2019 12:26)  POCT Blood Glucose.: 396 mg/dL (19 Sep 2019 10:42)  POCT Blood Glucose.: 264 mg/dL (19 Sep 2019 07:52)  POCT Blood Glucose.: 248 mg/dL (18 Sep 2019 21:21)  POCT Blood Glucose.: 208 mg/dL (18 Sep 2019 16:38)    I&O's Summary    18 Sep 2019 07:01  -  19 Sep 2019 07:00  --------------------------------------------------------  IN: 50 mL / OUT: 450 mL / NET: -400 mL        PHYSICAL EXAM:  GENERAL: nad, resting comfortably in bed  EYES:  NECK:   CHEST/LUNG: ctab no w/r/r  HEART: rrr no m/g/r  ABDOMEN: soft nt nd  EXTREMITIES:  bandaged bl le  PSYCH:   NEUROLOGY:   SKIN:    LABS:                        12.0   11.73 )-----------( 307      ( 19 Sep 2019 06:20 )             37.7     09-19    134<L>  |  104  |  59<H>  ----------------------------<  422<H>  5.4<H>   |  14<L>  |  2.1<H>    Ca    9.3      19 Sep 2019 12:10  Phos  3.7     09-18  Mg     2.1     09-18    TPro  4.9<L>  /  Alb  2.4<L>  /  TBili  0.3  /  DBili  x   /  AST  22  /  ALT  46<H>  /  AlkPhos  224<H>  09-19    PTT - ( 19 Sep 2019 06:20 )  PTT:66.8 sec          RADIOLOGY & ADDITIONAL TESTS:    Imaging Personally Reviewed:  Yes    Consultant(s) Notes Reviewed:      Care Discussed with Consultants/Other Providers:    Assessment and Plan   PAST MEDICAL & SURGICAL HISTORY:  Depression  Chronic kidney disease (CKD)  Dyslipidemia  Hypertension  Diabetes mellitus

## 2019-09-19 NOTE — PROGRESS NOTE ADULT - GASTROINTESTINAL DETAILS
no rigidity/nontender/no guarding/soft/no rebound tenderness
soft/no rebound tenderness/no rigidity/nontender/no guarding
nontender/no guarding/no rebound tenderness/no rigidity/soft
nontender/soft/no rebound tenderness/no rigidity/no guarding
soft/no guarding/nontender/no rebound tenderness/no rigidity

## 2019-09-19 NOTE — PROGRESS NOTE ADULT - RS GEN PE MLT RESP DETAILS PC
diminished breath sounds, R/diminished breath sounds, L
diminished breath sounds, L
no rhonchi/no wheezes/no rales
no rales/no wheezes/no rhonchi
no rhonchi/no wheezes/no rales

## 2019-09-19 NOTE — PROGRESS NOTE ADULT - SUBJECTIVE AND OBJECTIVE BOX
PM rounds  POD # 1 s/p debridement of bilateral leg wounds     Pt - awake alert   Bilateral legs - edema  grossly clean open wounds ; bloody drainage and small amt of clot on dressing   slow oozing right leg wound - decreased with pressure     wounds irrigated and dressing replaced     pt paulo well

## 2019-09-19 NOTE — CHART NOTE - NSCHARTNOTEFT_GEN_A_CORE
Registered Dietitian Follow-Up     Patient Profile Reviewed                           Yes [x]   No []     Nutrition History Previously Obtained        Yes [x]  No []       Pertinent Subjective Information:     Pertinent Medical Interventions: Bradycardia. UTI , Positive UA with hematuria and increased urinary frequency. BALDEV on CKD 3 -creatinine stable at 1.7-1.9. Cellulitis, LLE - s/p debridement today.      Diet order: Mech soft, consistent carb, no conc K+, low sodium      Anthropometrics:  - Ht. 157.5 cm  - Wt. 68.9 kg (9/13) vs. 64 kg (9/18) will monitor weight fluctuations   - %wt change  - BMI: 25.7.   - IBW: 110#+/-10%.    Pertinent Lab Data: (9/19) RBC 4.06  K 6.4  BUN 57  Cr 2.0  glucose 269      Pertinent Meds:  heparin, abx, insulin, lipitor, synthroid, cozaar, protonix, prednisone     Physical Findings:  - Appearance: alert  - GI function:  - Tubes:  - Oral/Mouth cavity: tolerating mech soft   - Skin: stg2 PU b/l IT, 1+ b/l leg and R hand      Nutrition Requirements  Weight Used: dosing  141#, needs cont from RD assessment 9/12     estimated energy needs: 1265-1580kcal (MSJx1.2-1.5AF) meets criteria for PCM, pressure injuries  estimated protein needs: 64-77g (1.0-1.2g/kg) as above, accounts for BALDEV on CKD, pressure injuries stage II  estimated fluid needs: per LIP     Nutrient Intake: likely meeting needs         [] Previous Nutrition Diagnosis: moderate PCM/Increased Nutrient Needs            [x] Ongoing, but po intake has improved          [] Resolved    [] No active nutrition diagnosis identified at this time     Nutrition Diagnostic #1  Problem:  Etiology:  Statement:     Nutrition Diagnostic #2  Problem:  Etiology:  Statement:     Nutrition Intervention meal/snack     Goal/Expected Outcome: Pt to consume >75% of meal tray in 7 days     Indicator/Monitoring: RD to monitor energy intake, diet order, body composition, NFPF, electrolyte profile     Recommendation: Cont current diet order. Registered Dietitian Follow-Up     Patient Profile Reviewed                           Yes [x]   No []     Nutrition History Previously Obtained        Yes [x]  No []       Pertinent Subjective Information: Pt tolerating food texture now. Documented po intake %      Pertinent Medical Interventions: Bradycardia. UTI , Positive UA with hematuria and increased urinary frequency. BALDEV on CKD 3 -creatinine stable at 1.7-1.9. Cellulitis, LLE - s/p debridement today.      Diet order: Mech soft, consistent carb, no conc K+, low sodium      Anthropometrics:  - Ht. 157.5 cm  - Wt. 68.9 kg (9/13) vs. 64 kg (9/18) will monitor weight fluctuations   - %wt change  - BMI: 25.7.   - IBW: 110#+/-10%.    Pertinent Lab Data: (9/19) RBC 4.06  K 6.4  BUN 57  Cr 2.0  glucose 269      Pertinent Meds:  heparin, abx, insulin, lipitor, synthroid, cozaar, protonix, prednisone     Physical Findings:  - Appearance: alert  - GI function: LBM 9/19   - Tubes:  - Oral/Mouth cavity: tolerating mech soft   - Skin: stg2 PU b/l IT, 1+ b/l leg and R hand      Nutrition Requirements  Weight Used: dosing  141#, needs cont from RD assessment 9/12     estimated energy needs: 1265-1580kcal (MSJx1.2-1.5AF) meets criteria for PCM, pressure injuries  estimated protein needs: 64-77g (1.0-1.2g/kg) as above, accounts for BALDEV on CKD, pressure injuries stage II  estimated fluid needs: per LIP     Nutrient Intake: likely meeting needs         [] Previous Nutrition Diagnosis: moderate PCM/Increased Nutrient Needs            [x] Ongoing, but po intake has improved          [] Resolved    [] No active nutrition diagnosis identified at this time     Nutrition Diagnostic #1  Problem:  Etiology:  Statement:     Nutrition Diagnostic #2  Problem:  Etiology:  Statement:     Nutrition Intervention meal/snack     Goal/Expected Outcome: Pt to consume >75% of meal tray in 7 days     Indicator/Monitoring: RD to monitor energy intake, diet order, body composition, NFPF, electrolyte profile     Recommendation: Cont current diet order.

## 2019-09-19 NOTE — PROGRESS NOTE ADULT - SUBJECTIVE AND OBJECTIVE BOX
INTERVAL HPI/OVERNIGHT EVENTS:  Pt is tachypneic and complaints of SOB.  She is on RA.  She received a dose of IV lasix at 10 am    MEDICATIONS  (STANDING):  atorvastatin 10 milliGRAM(s) Oral at bedtime  cefepime   IVPB 1000 milliGRAM(s) IV Intermittent every 24 hours  chlorhexidine 4% Liquid 1 Application(s) Topical <User Schedule>  dextrose 5%. 1000 milliLiter(s) (50 mL/Hr) IV Continuous <Continuous>  dextrose 50% Injectable 12.5 Gram(s) IV Push once  dextrose 50% Injectable 25 Gram(s) IV Push once  dextrose 50% Injectable 25 Gram(s) IV Push once  heparin  Infusion 900 Unit(s)/Hr (9 mL/Hr) IV Continuous <Continuous>  hydrALAZINE 25 milliGRAM(s) Oral three times a day  influenza   Vaccine 0.5 milliLiter(s) IntraMuscular once  insulin glargine Injectable (LANTUS) 15 Unit(s) SubCutaneous every morning  insulin lispro Injectable (HumaLOG) 5 Unit(s) SubCutaneous three times a day before meals  levothyroxine 125 MICROGram(s) Oral daily  lidocaine   Patch 1 Patch Transdermal daily  NIFEdipine XL 30 milliGRAM(s) Oral daily  pantoprazole    Tablet 40 milliGRAM(s) Oral before breakfast  predniSONE   Tablet 20 milliGRAM(s) Oral daily  vancomycin  IVPB 500 milliGRAM(s) IV Intermittent once    MEDICATIONS  (PRN):  acetaminophen   Tablet .. 650 milliGRAM(s) Oral every 6 hours PRN Mild Pain (1 - 3)  dextrose 40% Gel 15 Gram(s) Oral once PRN Blood Glucose LESS THAN 70 milliGRAM(s)/deciliter  glucagon  Injectable 1 milliGRAM(s) IntraMuscular once PRN Glucose LESS THAN 70 milligrams/deciliter      Allergies    No Known Allergies    Intolerances        REVIEW OF SYSTEMS    + SOB.  She denies pain.  Remainder 10 point ROS is negative    Vital Signs Last 24 Hrs  T(C): 35.6 (19 Sep 2019 12:59), Max: 36.6 (18 Sep 2019 14:35)  T(F): 96 (19 Sep 2019 12:59), Max: 97.8 (18 Sep 2019 14:35)  HR: 69 (19 Sep 2019 12:59) (69 - 80)  BP: 161/70 (19 Sep 2019 12:59) (119/62 - 161/70)  BP(mean): --  RR: 20 (19 Sep 2019 12:59) (18 - 21)  SpO2: 97% (19 Sep 2019 10:25) (96% - 97%)    09-18-19 @ 07:01  -  09-19-19 @ 07:00  --------------------------------------------------------  IN: 50 mL / OUT: 450 mL / NET: -400 mL        Physical Exam    GENERAL: In no apparent distress, well nourished, and hydrated.  NECK: Supple  HEART: Regular rate and rhythm; No murmurs, rubs, or gallops. + [facility] temp-permanent pacemaker  PULMONARY: + rales throughout  ABDOMEN: Soft, Nontender, Nondistended; Bowel sounds present  EXTREMITIES:  2+ Peripheral Pulses, No clubbing, cyanosis, or edema  NEUROLOGICAL: Grossly nonfocal    LABS:                        12.0   11.73 )-----------( 307      ( 19 Sep 2019 06:20 )             37.7     09-19    134<L>  |  104  |  59<H>  ----------------------------<  422<H>  5.4<H>   |  14<L>  |  2.1<H>    Ca    9.3      19 Sep 2019 12:10  Phos  3.7     09-18  Mg     2.1     09-18    TPro  4.9<L>  /  Alb  2.4<L>  /  TBili  0.3  /  DBili  x   /  AST  22  /  ALT  46<H>  /  AlkPhos  224<H>  09-19    PTT - ( 19 Sep 2019 06:20 )  PTT:66.8 sec      RADIOLOGY & ADDITIONAL TESTS:

## 2019-09-19 NOTE — PROGRESS NOTE ADULT - SUBJECTIVE AND OBJECTIVE BOX
ANGELFREDI  83y, Female      OVERNIGHT EVENTS:    no fevers, LEs discomfort  NAD at rest    VITALS:  T(F): 97.2, Max: 97.8 (09-18-19 @ 14:35)  HR: 75  BP: 145/65  RR: 18Vital Signs Last 24 Hrs  T(C): 36.2 (19 Sep 2019 05:27), Max: 36.6 (18 Sep 2019 14:35)  T(F): 97.2 (19 Sep 2019 05:27), Max: 97.8 (18 Sep 2019 14:35)  HR: 75 (19 Sep 2019 05:27) (64 - 80)  BP: 145/65 (19 Sep 2019 05:27) (110/45 - 153/66)  BP(mean): --  RR: 18 (18 Sep 2019 15:05) (12 - 21)  SpO2: 96% (18 Sep 2019 14:35) (96% - 97%)    TESTS & MEASUREMENTS:                        11.4   11.59 )-----------( 232      ( 18 Sep 2019 17:58 )             36.5     09-18    135  |  111<H>  |  54<H>  ----------------------------<  276<H>  5.9<H>   |  12<L>  |  1.9<H>    Ca    9.0      18 Sep 2019 17:58  Phos  3.7     09-18  Mg     2.1     09-18          Culture - Blood (collected 09-13-19 @ 04:00)  Source: .Blood Blood  Final Report (09-18-19 @ 14:00):    No growth at 5 days.            RADIOLOGY & ADDITIONAL TESTS:    ANTIBIOTICS:  cefepime   IVPB 1000 milliGRAM(s) IV Intermittent every 24 hours  vancomycin  IVPB 500 milliGRAM(s) IV Intermittent once

## 2019-09-19 NOTE — PROGRESS NOTE ADULT - EXTREMITIES COMMENTS
erythema better, no change in ulcers
erythema mostly resolved, no change in superficial ulcers right > left
RLE: reduced erythema, no change in ulcers, minimal drainage
erythema RLE> LLE

## 2019-09-19 NOTE — PROGRESS NOTE ADULT - ASSESSMENT
83F PMHx DM2, ?CKD III, HLD, HTN, hypothyroidism, RA, depression here with generalized weakness found to have 2:1 av block, wenckebach s/p TVP. Cellulitis, LLE, s/p debridement. Emphysematous uti, ecoli.    #2:1 av block, wenckebach, s/p TVP  currently nsr with demand vpacing on tele  f/u eps for ppm  repeat cxr, interrogation per eps  #Cellulitis, LLE, morganella on wcx  cefepime plan to end 9/28  s/p debridement  f/u wcx  wound care, f/u burn  #UTI, emphysematous, ecoli  cefepime as above  #Metabolic acidosis, combined ag and nonag; delta delta 0.4  check urine ag  start bicarb 650 tid  f/u renin:aldosterone  #RLE DVT  hep gtt  change to eliquis s/p PPM  #Hypothyroidism  synthroid 125  #HTN  hydralazine 25 tid  nifedipine 30  #DM2  lantus 15  humalog 5 tid  ssi  #CKD III  no baseline scr  will confirm with family  scr stable, trend  #RA  prednisone 20  #HLD  lipitor 10  #DVT ppx  hep gtt    #Progress Note Handoff:  Pending (specify):  Consults_________, Tests________, Test Results_______, Other____f/u eps_____  Family discussion:d/w pt and son at bedside re: primary diagnosis, plan of care  Disposition: Home___/SNF___/Other________/Unknown at this time____x____ .

## 2019-09-19 NOTE — CHART NOTE - NSCHARTNOTEFT_GEN_A_CORE
6.0 K+ from lab. Insulin 10 units IV and D50 plus kayexalate 30mg ordered. No peaked T waves seen on EKG.

## 2019-09-19 NOTE — PROGRESS NOTE ADULT - ASSESSMENT
Bilateral leg wounds s./p debridement   Rec: continue wound care - Bacitracin ointment, Adaptic and dry dressing , elevation and compression  Continue pain mgmt

## 2019-09-19 NOTE — PROGRESS NOTE ADULT - ASSESSMENT
ASSESSMENT  83yFemale with a PMH of Type 2 DM, Anemia, CKD 3, DLD, RA, Depression brought in for weakness, decreased ambulation, frequent falls, and increased urination     IMPRESSION  Cellulitis of LLE : with no signs of OM, fascitis, or abscess  Emphysematous Cystitis : unlikely given no pyuria, dysuria and clinically no evidence of cystitis.  Hematuria : resolved  Cholelithiasis: No evidence of Cholestasis in setting of dilated CBD . no acute cholecystitis/ cholangitis.  CBD significantly dilated  BCx 9/9, 13  NG  UCx 9/10 E coli  WCx Morganella    RECOMMENDATIONS  Cefepime 1 gm iv q24h  Change to po Cipro 250 mg q12h for 10 more days on discharge starting 9/18  Silverdene to bilateral LE BID   Could proceed from ID standpoint for the cardiac procedure  recall prn please

## 2019-09-19 NOTE — PROGRESS NOTE ADULT - ASSESSMENT
83 year old female with PMH of DM II, anemia, CKD, DLD, RA, depression brought in by EMS from home for weakness, decreased ambulation, frequent falls and frequent urination. Found to have Wenckebach  block and periods of 2:1 block. TSH 3.75, EF 45-50% with mild to mod TR/RI. Continues to drop HR to 30s, asymptomatic.     Wenckebach  block and periods of 2:1 block  Bradycardic to 30s when asleep    - Pt is s/p temporary/permanent PM   - Stat CXR  - recommend repeat echo (ordered)  - recommend give another dose of Lasix 40 mg IV x 1 this afternoon  - recommend renal consult  - Plan for PPM once pt no longer requires further leg debridements and once she is off antibiotics.

## 2019-09-20 NOTE — CHART NOTE - NSCHARTNOTEFT_GEN_A_CORE
Electrophysiolgy:    Patient remains with WBC of 18  OR cultures with rare P. Aerugonosa in 1 out of 3 cultures  Will reach out to Burn on Monday and re-evaluate timing of PPM  Cont current management

## 2019-09-20 NOTE — PROGRESS NOTE ADULT - ASSESSMENT
83 year old female with PMH of DM II, CKD, DLD, RA, depression brought in by EMS from home for weakness, decreased ambulation, frequent falls and frequent urination. Admitted with multiple comorbidities.    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  - TSH wnl  - echo- EF 45-50  - s/p temporary PPM on 9/17  - no confirmed date for permanent PPM yet  - needs clearance from medicine, burn and ID to proceed    # UTI , Positive UA with hematuria and increased urinary frequency  on cefepime now  could change to cipro on  DC    # Right common femoral DVT   - Vascular consult appreciated - heparin in hospital then Eliquis on discharge; f/u w/in 2 mos w/ vascular  - Heparin for Ac until permanent PPM is placed    # BALDEV on CKD 3  worsening creatinine    # Common bile duct choledocholith  - no surgical intervention  - US abdomen: +ve cholelithiasis, no cholecystitis.   - Given no evidence of cholangitis or obstructive sx, would defer intervention in active infection and cardiac comorbidity - ERCP to be considered vs IR biliary drainage should the prior conditions develop    # Bilateral shins ulceration with erythema and discharge  - s/p debridement 9/18  - f/u wound culture  - f/u with burn  - continue with cefepime    # Rt shoulder pain  - X ray acromioclavicular joint separation of indeterminate age with widening of the AC joint interval measuring 1 cm.   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  - care discussed with ortho- Sling and outpt follow up in 4 weeks, WBAT in RUE    # Weakness and frequent falls  - Likely due to multiple comorbidities  - Trauma workup in ED negative for any fractures  - follow up with PT/rehab after procedure    # DM II  - c/w basal and bolus insulin regimen  - dose adjusted today  - Monitor Fingersticks  - Carb consistent diet    # Rheumatoid arthritis  - On Prednisone 20mg qd  - stable    # DLD - Continue statin    # Elder self-neglect  - poor nutritional status   - Unsafe living condition  - Needs placement    GI ppx: Protonix    DVT ppx/tx: heparin drip, change to eliquis on dc after PPM placement    Dispo: from home, needs placement 83 year old female with PMH of DM II, CKD, DLD, RA, depression brought in by EMS from home for weakness, decreased ambulation, frequent falls and frequent urination. Admitted with multiple comorbidities.    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  - TSH wnl  - echo- EF 45-50  - s/p temporary PPM on 9/17  - no confirmed date for permanent PPM yet  - needs clearance from medicine, burn and ID to proceed    # UTI , Positive UA with hematuria and increased urinary frequency  on cefepime now  could change to cipro on  DC    # Right common femoral DVT   - Vascular consult appreciated - heparin in hospital then Eliquis on discharge; f/u w/in 2 mos w/ vascular  - Heparin for Ac until permanent PPM is placed    # BALDEV on CKD 3  worsening creatinine  non anion gap metabolic acidosis  will start gentle hydration  on po bicarbonate  follow up BMP  also had hyperkalemia s/p kayxelate, dextrose, and insulin    # Common bile duct choledocholith  - no surgical intervention  - US abdomen: +ve cholelithiasis, no cholecystitis.   - Given no evidence of cholangitis or obstructive sx, would defer intervention in active infection and cardiac comorbidity - ERCP to be considered vs IR biliary drainage should the prior conditions develop    # Bilateral shins ulceration with erythema and discharge  - s/p debridement 9/18  - f/u wound culture  - f/u with burn  - continue with cefepime    # Rt shoulder pain  - X ray acromioclavicular joint separation of indeterminate age with widening of the AC joint interval measuring 1 cm.   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  - care discussed with ortho- Sling and outpt follow up in 4 weeks, WBAT in RUE    # Weakness and frequent falls  - Likely due to multiple comorbidities  - Trauma workup in ED negative for any fractures  - follow up with PT/rehab after procedure    # DM II  - c/w basal and bolus insulin regimen  - dose adjusted today  - Monitor Fingersticks  - Carb consistent diet    # Rheumatoid arthritis  - On Prednisone 20mg qd  - stable    # DLD - Continue statin    # Elder self-neglect  - poor nutritional status   - Unsafe living condition  - Needs placement    GI ppx: Protonix    DVT ppx/tx: heparin drip, change to eliquis on dc after PPM placement    Dispo: from home, needs placement 83 year old female with PMH of DM II, CKD, DLD, RA, depression brought in by EMS from home for weakness, decreased ambulation, frequent falls and frequent urination. Admitted with multiple comorbidities.    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  - TSH wnl  - echo- EF 45-50  - s/p temporary PPM on 9/17  - no confirmed date for permanent PPM yet  - needs clearance from medicine, burn and ID to proceed    # UTI , Positive UA with hematuria and increased urinary frequency  on cefepime now  stop cefepime on september 22  could change to cipro on  DC    # Right common femoral DVT   - Vascular consult appreciated - heparin in hospital then Eliquis on discharge; f/u w/in 2 mos w/ vascular  - Heparin for Ac until permanent PPM is placed    # BALDEV on CKD 3  worsening creatinine  non anion gap metabolic acidosis  will start gentle hydration  on po bicarbonate  follow up BMP  also had hyperkalemia s/p kayxelate, dextrose, and insulin    # Common bile duct choledocholith  - no surgical intervention  - US abdomen: +ve cholelithiasis, no cholecystitis.   - Given no evidence of cholangitis or obstructive sx, would defer intervention in active infection and cardiac comorbidity - ERCP to be considered vs IR biliary drainage should the prior conditions develop    # Bilateral shins ulceration with erythema and discharge  - s/p debridement 9/18  - f/u wound culture  - f/u with burn  - continue with cefepime    # Rt shoulder pain  - X ray acromioclavicular joint separation of indeterminate age with widening of the AC joint interval measuring 1 cm.   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  - care discussed with ortho- Sling and outpt follow up in 4 weeks, WBAT in RUE    # Weakness and frequent falls  - Likely due to multiple comorbidities  - Trauma workup in ED negative for any fractures  - follow up with PT/rehab after procedure    # DM II  - c/w basal and bolus insulin regimen  - dose adjusted today  - Monitor Fingersticks  - Carb consistent diet    # Rheumatoid arthritis  - On Prednisone 20mg qd  - stable    # DLD - Continue statin    # Elder self-neglect  - poor nutritional status   - Unsafe living condition  - Needs placement    GI ppx: Protonix    DVT ppx/tx: heparin drip, change to eliquis on dc after PPM placement    Dispo: from home, needs placement

## 2019-09-20 NOTE — CONSULT NOTE ADULT - ASSESSMENT
83y Female with past medical hx of HTN, ckd ( ? BASELINE), TYPE 2 dm, dld admitted initially because of weakness and increased urinary frequency diagnosed as emphymatous cystitis.    # BALDEV on ckd ( baseline ?)       renal sonogram consistent with echogenic kidneys     persistent hyperkalemia heparin induced/ BALDEV     hold oral bicarbonate/ lasix     started on iv bicarbonate 0.45 % with 75 meq bicarb at 50 cc/hour     strict I/O     check urinalyis/ urine electrolytes     plasma renin     check CK, phosphorus     will follow

## 2019-09-20 NOTE — PRE-OP CHECKLIST - INTERNAL PROSTHESES
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 Belongings bag under stretcher.  " no

## 2019-09-20 NOTE — CONSULT NOTE ADULT - PROVIDER SPECIALTY LIST ADULT
Burn
Cardiology
Electrophysiology
Gastroenterology
Nephrology
Orthopedics
Physiatry
Urology
Vascular Surgery
Infectious Disease
Surgery

## 2019-09-20 NOTE — CONSULT NOTE ADULT - ATTENDING COMMENTS
Right CFV DVT. IV heparin in hospital and Oral Eliquis upon discharge. F/u 2 months in vascular office. Normal arterial duplex.
massive cuff tear ,rehab f/u office 4 weeks ,wt bare as paulo rt upeer ext
I personally saw and examined the patient, reviewed the chart and available data. I discussed the situation with the patient and  PA staff. I also reviewed and/or amended the note as necessary.    patient seen on the day in question. Note not reviewed and cosigned until now due to scheduling issues
Patient examined and seen, Appears well, she is to follow up with Dr. Quesada on discharge. No plan for procedure at the moment unless clinical picture changes
pt admited for emphysamatous cystitis  possible cbd stones  treat UTI for now  GI for possible ERCP     Cholecystectomy to be discussed electively
Pt seen examined  Agree with above.  Pt with DM, HTN, unknown baseline creat, presents with creat ~ 2.0 mild acidosis  Had LE I&D for ulcers, found to have DVT. Also started on lasix for labored breathing, CXR no PVC.  BALDEV - hold diuretics, gentle hydration with isotonic bicarb  Low K diet  Hyperkalemia - due to BALDEV, Heparin, hyporenin-hypoaldoteronism?  hydration will help eliminate K  Thank you
I have personally examined the patient and reviewed the documentation above.  Corrections and edits were made wherever needed.       Changes were made to residents impression and recommendations
Att note  pt seen and examined with MS  Agree with above h&p  Proceed with sx at mod risk  EP f/up  cont tele

## 2019-09-20 NOTE — PROGRESS NOTE ADULT - SUBJECTIVE AND OBJECTIVE BOX
SUBJECTIVE:    Patient is a 83y old Female who presents with a chief complaint of Emphysematous cystitis (19 Sep 2019 20:27)    Currently admitted to medicine with the primary diagnosis of Emphysematous cystitis     Today is hospital day 10d.   This morning she is resting comfortably in bed and reports no new issues or overnight events.  post debridement day 2  complaints of pain at debridement site  on heparin drip  ptt q24 hour stable  f/u ptt at 11 am      PAST MEDICAL & SURGICAL HISTORY  Depression  Chronic kidney disease (CKD)  Dyslipidemia  Hypertension  Diabetes mellitus    ALLERGIES:  No Known Allergies    MEDICATIONS:  STANDING MEDICATIONS  atorvastatin 10 milliGRAM(s) Oral at bedtime  cefepime   IVPB 1000 milliGRAM(s) IV Intermittent every 24 hours  chlorhexidine 4% Liquid 1 Application(s) Topical <User Schedule>  dextrose 5%. 1000 milliLiter(s) IV Continuous <Continuous>  dextrose 50% Injectable 12.5 Gram(s) IV Push once  dextrose 50% Injectable 25 Gram(s) IV Push once  dextrose 50% Injectable 25 Gram(s) IV Push once  furosemide   Injectable 40 milliGRAM(s) IV Push daily  heparin  Infusion 900 Unit(s)/Hr IV Continuous <Continuous>  hydrALAZINE 25 milliGRAM(s) Oral three times a day  influenza   Vaccine 0.5 milliLiter(s) IntraMuscular once  insulin glargine Injectable (LANTUS) 28 Unit(s) SubCutaneous every morning  insulin lispro Injectable (HumaLOG) 8 Unit(s) SubCutaneous three times a day before meals  levothyroxine 125 MICROGram(s) Oral daily  lidocaine   Patch 1 Patch Transdermal daily  NIFEdipine XL 30 milliGRAM(s) Oral daily  pantoprazole    Tablet 40 milliGRAM(s) Oral before breakfast  predniSONE   Tablet 20 milliGRAM(s) Oral daily  sodium bicarbonate 650 milliGRAM(s) Oral three times a day  vancomycin  IVPB 500 milliGRAM(s) IV Intermittent once    PRN MEDICATIONS  acetaminophen   Tablet .. 650 milliGRAM(s) Oral every 6 hours PRN  dextrose 40% Gel 15 Gram(s) Oral once PRN  glucagon  Injectable 1 milliGRAM(s) IntraMuscular once PRN  oxyCODONE    IR 5 milliGRAM(s) Oral every 6 hours PRN    VITALS:   T(F): 97.8  HR: 91  BP: 147/64  RR: 20  SpO2: 95%    LABS:                        12.0   11.73 )-----------( 307      ( 19 Sep 2019 06:20 )             37.7     09-19    133<L>  |  108  |  60<H>  ----------------------------<  357<H>  6.0<HH>   |  15<L>  |  2.2<H>    Ca    9.4      19 Sep 2019 16:49  Phos  3.7     09-18  Mg     2.1     09-18    TPro  4.9<L>  /  Alb  2.4<L>  /  TBili  0.3  /  DBili  x   /  AST  22  /  ALT  46<H>  /  AlkPhos  224<H>  09-19    PTT - ( 19 Sep 2019 06:20 )  PTT:66.8 sec          Culture - Surgical Swab (collected 18 Sep 2019 21:00)  Source: .Surgical Swab None  Preliminary Report (20 Sep 2019 08:21):    Rare Pseudomonas aeruginosa    Culture - Surgical Swab (collected 18 Sep 2019 21:00)  Source: .Surgical Swab None  Preliminary Report (20 Sep 2019 08:15):    No growth    Culture - Surgical Swab (collected 18 Sep 2019 21:00)  Source: .Surgical Swab None  Preliminary Report (20 Sep 2019 08:14):    No growth    PHYSICAL EXAM:  GEN: No acute distress  LUNGS: Clear to auscultation bilaterally   HEART: Regular  ABD: Soft, non-tender, non-distended.  EXT: NC/NC/NE/2+PP/LEE/Skin Intact.   NEURO: AAOX3    Intravenous access:   Morris Catheter:   Indwelling Urethral Catheter:     Connect To:  Straight Drainage/Gravity    Indication:  Urine Output Monitoring in Critically Ill (09-18-19 @ 13:38) (not performed) [active]  Indwelling Urethral Catheter:     Connect To:  Leg Bag    Indication:  Improved Comfort Care (09-18-19 @ 13:38) (not performed) [active]

## 2019-09-20 NOTE — CONSULT NOTE ADULT - SUBJECTIVE AND OBJECTIVE BOX
NEPHROLOGY CONSULTATION NOTE    83y Female with past medical hx of HTN, ckd ( ? BASELINE), TYPE 2 dm, dld admitted initially because of weakness and increased urinary frequency diagnosed as emphymatous cystitis.  while in hospital pt was found to have right common femoal DVT started on heparin drip, has bilateral skin ulceration s/p debridement, was bradycardic s/p temporary pacemaker.  nephrology is consulted for persistent hyperkalemia and BALDEV.  s/p foleys catheter    PAST MEDICAL & SURGICAL HISTORY:  Depression  Chronic kidney disease (CKD)  Dyslipidemia  Hypertension  Diabetes mellitus    Allergies:  No Known Allergies    Home Medications Reviewed  Hospital Medications:   MEDICATIONS  (STANDING):  atorvastatin 10 milliGRAM(s) Oral at bedtime  cefepime   IVPB 1000 milliGRAM(s) IV Intermittent every 24 hours  chlorhexidine 4% Liquid 1 Application(s) Topical <User Schedule>  dextrose 5%. 1000 milliLiter(s) (50 mL/Hr) IV Continuous <Continuous>  dextrose 50% Injectable 12.5 Gram(s) IV Push once  dextrose 50% Injectable 25 Gram(s) IV Push once  dextrose 50% Injectable 25 Gram(s) IV Push once  heparin  Infusion 900 Unit(s)/Hr (9 mL/Hr) IV Continuous <Continuous>  hydrALAZINE 25 milliGRAM(s) Oral three times a day  influenza   Vaccine 0.5 milliLiter(s) IntraMuscular once  insulin glargine Injectable (LANTUS) 28 Unit(s) SubCutaneous every morning  insulin lispro Injectable (HumaLOG) 8 Unit(s) SubCutaneous three times a day before meals  levothyroxine 125 MICROGram(s) Oral daily  lidocaine   Patch 1 Patch Transdermal daily  NIFEdipine XL 30 milliGRAM(s) Oral daily  pantoprazole    Tablet 40 milliGRAM(s) Oral before breakfast  predniSONE   Tablet 20 milliGRAM(s) Oral daily  sodium chloride 0.45% 1000 milliLiter(s) (50 mL/Hr) IV Continuous <Continuous>      SOCIAL HISTORY:  Denies ETOH,Smoking,   FAMILY HISTORY:        REVIEW OF SYSTEMS:  CONSTITUTIONAL: No weakness, fevers or chills  EYES/ENT: No visual changes;  No vertigo or throat pain   NECK: No pain or stiffness  RESPIRATORY: No cough, wheezing, hemoptysis; No shortness of breath  CARDIOVASCULAR: No chest pain or palpitations.  GASTROINTESTINAL: No abdominal or epigastric pain. No nausea, vomiting, or hematemesis; No diarrhea or constipation. No melena or hematochezia.  GENITOURINARY: No dysuria, frequency, foamy urine, urinary urgency, incontinence or hematuria  NEUROLOGICAL: No numbness or weakness  SKIN: No itching, burning, rashes, or lesions   VASCULAR: No bilateral lower extremity edema.   All other review of systems is negative unless indicated above.    VITALS:  T(F): 97.8 (09-20-19 @ 07:10), Max: 98.4 (09-19-19 @ 21:16)  HR: 91 (09-20-19 @ 07:10)  BP: 147/64 (09-20-19 @ 07:10)  RR: 20 (09-19-19 @ 21:16)  SpO2: 95% (09-19-19 @ 22:28)    09-19 @ 07:01  -  09-20 @ 07:00  --------------------------------------------------------  IN: 480 mL / OUT: 1650 mL / NET: -1170 mL      Height (cm): 157.48 (09-19 @ 20:27)  Weight (kg): 64 (09-19 @ 20:27)  BMI (kg/m2): 25.8 (09-19 @ 20:27)  BSA (m2): 1.65 (09-19 @ 20:27)    09-19-19 @ 07:01  -  09-20-19 @ 07:00  --------------------------------------------------------  IN: 0 mL / OUT: 1650 mL / NET: -1650 mL      I&O's Detail    19 Sep 2019 07:01  -  20 Sep 2019 07:00  --------------------------------------------------------  IN:    Oral Fluid: 480 mL  Total IN: 480 mL    OUT:    Indwelling Catheter - Urethral: 1650 mL  Total OUT: 1650 mL    Total NET: -1170 mL    PHYSICAL EXAM:  alert oriented not in any acute distress  s/p temporary pacemaker  s/p foleys catheter  chest minimal bilateral basal crackles  cvs s1 and s2 no added sound  abdomen soft lax  minimal pedal edema right > left  dressing on bilateral lower leg    LABS:  09-19    133<L>  |  108  |  60<H>  ----------------------------<  357<H>  6.0<HH>   |  15<L>  |  2.2<H>    Ca    9.4      19 Sep 2019 16:49  Phos  3.7     09-18  Mg     2.1     09-18    TPro  4.9<L>  /  Alb  2.4<L>  /  TBili  0.3  /  DBili      /  AST  22  /  ALT  46<H>  /  AlkPhos  224<H>  09-19    Creatinine Trend: 2.2 <--, 2.1 <--, 2.0 <--, 1.9 <--, 1.9 <--, 2.0 <--, 1.9 <--, 1.8 <--, 1.7 <--, 1.7 <--, 1.8 <--, 1.8 <--, 1.8 <--, 1.9 <--, 2.0 <--, 2.1 <--, 2.4 <--                        12.0   11.73 )-----------( 307      ( 19 Sep 2019 06:20 )             37.7       RADIOLOGY & ADDITIONAL STUDIES:  < from: US Kidney and Bladder (09.11.19 @ 15:05) >    IMPRESSION:  Bilateral medical renal disease. No hydronephrosis.    < end of copied text > NEPHROLOGY CONSULTATION NOTE    83y Female with past medical hx of HTN, ckd ( ? BASELINE), TYPE 2 dm, dld admitted initially because of weakness and increased urinary frequency diagnosed as emphymatous cystitis.  while in hospital pt was found to have right common femoal DVT started on heparin drip, has bilateral skin ulceration s/p debridement, was bradycardic s/p temporary pacemaker.  nephrology is consulted for persistent hyperkalemia and BALDEV.  s/p foleys catheter    PAST MEDICAL & SURGICAL HISTORY:  Depression  Chronic kidney disease (CKD)  Dyslipidemia  Hypertension  Diabetes mellitus    Allergies:  No Known Allergies    Home Medications Reviewed  Hospital Medications:   MEDICATIONS  (STANDING):  atorvastatin 10 milliGRAM(s) Oral at bedtime  cefepime   IVPB 1000 milliGRAM(s) IV Intermittent every 24 hours  chlorhexidine 4% Liquid 1 Application(s) Topical <User Schedule>  dextrose 5%. 1000 milliLiter(s) (50 mL/Hr) IV Continuous <Continuous>  dextrose 50% Injectable 12.5 Gram(s) IV Push once  dextrose 50% Injectable 25 Gram(s) IV Push once  dextrose 50% Injectable 25 Gram(s) IV Push once  heparin  Infusion 900 Unit(s)/Hr (9 mL/Hr) IV Continuous <Continuous>  hydrALAZINE 25 milliGRAM(s) Oral three times a day  influenza   Vaccine 0.5 milliLiter(s) IntraMuscular once  insulin glargine Injectable (LANTUS) 28 Unit(s) SubCutaneous every morning  insulin lispro Injectable (HumaLOG) 8 Unit(s) SubCutaneous three times a day before meals  levothyroxine 125 MICROGram(s) Oral daily  lidocaine   Patch 1 Patch Transdermal daily  NIFEdipine XL 30 milliGRAM(s) Oral daily  pantoprazole    Tablet 40 milliGRAM(s) Oral before breakfast  predniSONE   Tablet 20 milliGRAM(s) Oral daily  sodium chloride 0.45% 1000 milliLiter(s) (50 mL/Hr) IV Continuous <Continuous>      SOCIAL HISTORY:  Denies ETOH,Smoking,   FAMILY HISTORY:        REVIEW OF SYSTEMS:  CONSTITUTIONAL: No weakness, fevers or chills  EYES/ENT: No visual changes;  No vertigo or throat pain   NECK: No pain or stiffness  RESPIRATORY: No cough, wheezing, hemoptysis; No shortness of breath  CARDIOVASCULAR: No chest pain or palpitations.  GASTROINTESTINAL: No abdominal or epigastric pain. No nausea, vomiting, or hematemesis; No diarrhea or constipation.   GENITOURINARY: No dysuria, frequency  SKIN: No itching, burning, rashes, or lesions   VASCULAR: mild bilateral lower extremity edema.   All other review of systems is negative unless indicated above.    VITALS:  T(F): 97.8 (09-20-19 @ 07:10), Max: 98.4 (09-19-19 @ 21:16)  HR: 91 (09-20-19 @ 07:10)  BP: 147/64 (09-20-19 @ 07:10)  RR: 20 (09-19-19 @ 21:16)  SpO2: 95% (09-19-19 @ 22:28)    09-19 @ 07:01  -  09-20 @ 07:00  --------------------------------------------------------  IN: 480 mL / OUT: 1650 mL / NET: -1170 mL      Height (cm): 157.48 (09-19 @ 20:27)  Weight (kg): 64 (09-19 @ 20:27)  BMI (kg/m2): 25.8 (09-19 @ 20:27)  BSA (m2): 1.65 (09-19 @ 20:27)    09-19-19 @ 07:01  -  09-20-19 @ 07:00  --------------------------------------------------------  IN: 0 mL / OUT: 1650 mL / NET: -1650 mL      I&O's Detail    19 Sep 2019 07:01  -  20 Sep 2019 07:00  --------------------------------------------------------  IN:    Oral Fluid: 480 mL  Total IN: 480 mL    OUT:    Indwelling Catheter - Urethral: 1650 mL  Total OUT: 1650 mL    Total NET: -1170 mL    PHYSICAL EXAM:  alert oriented not in any acute distress  s/p temporary pacemaker  s/p foleys catheter  chest minimal bilateral basal crackles  cvs s1 and s2 no added sound  abdomen soft lax  minimal pedal edema right > left  dressing on bilateral lower leg    LABS:  09-19    133<L>  |  108  |  60<H>  ----------------------------<  357<H>  6.0<HH>   |  15<L>  |  2.2<H>    Ca    9.4      19 Sep 2019 16:49  Phos  3.7     09-18  Mg     2.1     09-18    TPro  4.9<L>  /  Alb  2.4<L>  /  TBili  0.3  /  DBili      /  AST  22  /  ALT  46<H>  /  AlkPhos  224<H>  09-19    Creatinine Trend: 2.2 <--, 2.1 <--, 2.0 <--, 1.9 <--, 1.9 <--, 2.0 <--, 1.9 <--, 1.8 <--, 1.7 <--, 1.7 <--, 1.8 <--, 1.8 <--, 1.8 <--, 1.9 <--, 2.0 <--, 2.1 <--, 2.4 <--                        12.0   11.73 )-----------( 307      ( 19 Sep 2019 06:20 )             37.7       RADIOLOGY & ADDITIONAL STUDIES:  < from: US Kidney and Bladder (09.11.19 @ 15:05) >    IMPRESSION:  Bilateral medical renal disease. No hydronephrosis.    < end of copied text >

## 2019-09-20 NOTE — CONSULT NOTE ADULT - REASON FOR ADMISSION
Emphysematous cystitis
Emphysematous cystitis
weakness, multiple medical comorbidities
Emphysematous cystitis

## 2019-09-20 NOTE — CONSULT NOTE ADULT - CONSULT REQUESTED DATE/TIME
10-Sep-2019 00:24
12-Sep-2019 09:52
09-Sep-2019 22:43
10-Sep-2019 00:58
10-Sep-2019 15:53
11-Sep-2019 19:04
12-Sep-2019 12:07
13-Sep-2019 08:56
20-Sep-2019 12:21
10-Sep-2019 09:51

## 2019-09-20 NOTE — PROGRESS NOTE ADULT - ATTENDING COMMENTS
Patient seen and examined independently. I agree with the resident's note, physical exam, and plan except as below.  Vital Signs Last 24 Hrs  T(C): 37.1 (20 Sep 2019 12:42), Max: 37.1 (20 Sep 2019 12:42)  T(F): 98.7 (20 Sep 2019 12:42), Max: 98.7 (20 Sep 2019 12:42)  HR: 88 (20 Sep 2019 12:42) (78 - 91)  BP: 152/67 (20 Sep 2019 12:42) (147/64 - 154/66)  BP(mean): --  RR: 18 (20 Sep 2019 12:42) (18 - 20)  SpO2: 95% (19 Sep 2019 22:28) (95% - 95%)  PE  nad  aaox3  [id number]  tempperm PPM right chest  ctabl  no wheeze, crackles  LE edema improved  bilat UE edema  bilat LE wounds dressed no soak through  +watson    #frequent falls - debility - pt/rehab - needs SNF  #right shoulder cuff tear - pt unable raise arm - ortho eval done - wbat, pt - outpt folloup  < from: Xray Shoulder 2 Views, Right (09.09.19 @ 21:50) >    Findings consistent with acromioclavicular joint separation of   indeterminate age with widening of the AC joint interval measuring 1 cm.   Correlate clinically for acute symptoms.      < end of copied text >    #ecoli uti/cystitis/polymicrobial Le cellulitis - on cefepime until 9/22 - ID -cleared for ppm  #hematuria - resolved - dc watson for TOV  #Right CF dvt - started on iv heparin - monitor PTT - 50-80 -  PTT - ( 20 Sep 2019 11:44 )  PTT:70.5 sec       PTT - ( 14 Sep 2019 05:29 )  PTT:56.3 sec  #BALDEV on CKD III - scr increased liekly due to lasix use  seen by nephro  po bicarb - monitor acidosis and wean down as needed  hold diuretics for now  CXR reviewed myself - no pna, no significant fluid overload        #CBD dilation - lfts wnl - asymptomatic - no signs of cholangitis - no intervention by GI due to multiple comorbid conditions   #DMII - insulin coverage -   CAPILLARY BLOOD GLUCOSE      POCT Blood Glucose.: 319 mg/dL (20 Sep 2019 11:36)  POCT Blood Glucose.: 167 mg/dL (20 Sep 2019 07:56)  POCT Blood Glucose.: 318 mg/dL (19 Sep 2019 21:59)  POCT Blood Glucose.: 314 mg/dL (19 Sep 2019 17:04)          #hxof RA on prednisone - no need for stress dose steroids on dopamine    # bella 1 -  - avoid AVnodal blocking agents - cont tele - EPS eval   spoke with Dr ramos - law into 20s and sinus pauses   sp temp perm external pacer  called Id Dr clark for clearance for permanent PPM  EPS follow up for plan of PPM  off dopamine      #LE cellulitis/ulceration - sp burn debridements - open wounds - local wound care    spoke with son at bedside and on phone  and resident .

## 2019-09-21 NOTE — PROGRESS NOTE ADULT - SUBJECTIVE AND OBJECTIVE BOX
Patient is a 83y old  Female who presents with a chief complaint of Emphysematous cystitis (20 Sep 2019 12:21)      SUBJECTIVE / OVERNIGHT EVENTS:  no cp, sob, abd pain, fever  bl le pain, controlled with medications, sharp, nonradiating, worsen to touch    MEDICATIONS  (STANDING):  atorvastatin 10 milliGRAM(s) Oral at bedtime  cefepime   IVPB 1000 milliGRAM(s) IV Intermittent every 24 hours  chlorhexidine 4% Liquid 1 Application(s) Topical <User Schedule>  dextrose 5%. 1000 milliLiter(s) (50 mL/Hr) IV Continuous <Continuous>  dextrose 50% Injectable 12.5 Gram(s) IV Push once  dextrose 50% Injectable 25 Gram(s) IV Push once  dextrose 50% Injectable 25 Gram(s) IV Push once  gentamicin 0.1% Ointment 1 Application(s) Topical two times a day  heparin  Infusion 800 Unit(s)/Hr (8 mL/Hr) IV Continuous <Continuous>  hydrALAZINE 25 milliGRAM(s) Oral three times a day  influenza   Vaccine 0.5 milliLiter(s) IntraMuscular once  insulin glargine Injectable (LANTUS) 32 Unit(s) SubCutaneous every morning  insulin lispro Injectable (HumaLOG) 8 Unit(s) SubCutaneous three times a day before meals  levothyroxine 125 MICROGram(s) Oral daily  lidocaine   Patch 1 Patch Transdermal daily  NIFEdipine XL 30 milliGRAM(s) Oral daily  pantoprazole    Tablet 40 milliGRAM(s) Oral before breakfast  predniSONE   Tablet 20 milliGRAM(s) Oral daily  sodium bicarbonate 650 milliGRAM(s) Oral three times a day    MEDICATIONS  (PRN):  acetaminophen   Tablet .. 650 milliGRAM(s) Oral every 6 hours PRN Moderate Pain (4 - 6)  acetaminophen   Tablet .. 650 milliGRAM(s) Oral every 6 hours PRN Mild Pain (1 - 3)  dextrose 40% Gel 15 Gram(s) Oral once PRN Blood Glucose LESS THAN 70 milliGRAM(s)/deciliter  glucagon  Injectable 1 milliGRAM(s) IntraMuscular once PRN Glucose LESS THAN 70 milligrams/deciliter  oxyCODONE    IR 5 milliGRAM(s) Oral every 6 hours PRN Severe Pain (7 - 10)        CAPILLARY BLOOD GLUCOSE      POCT Blood Glucose.: 359 mg/dL (21 Sep 2019 11:24)  POCT Blood Glucose.: 198 mg/dL (21 Sep 2019 07:36)  POCT Blood Glucose.: 315 mg/dL (20 Sep 2019 22:37)  POCT Blood Glucose.: 320 mg/dL (20 Sep 2019 21:46)  POCT Blood Glucose.: 321 mg/dL (20 Sep 2019 16:59)    I&O's Summary    20 Sep 2019 07:01  -  21 Sep 2019 07:00  --------------------------------------------------------  IN: 920 mL / OUT: 800 mL / NET: 120 mL    21 Sep 2019 07:01  -  21 Sep 2019 11:36  --------------------------------------------------------  IN: 970 mL / OUT: 850 mL / NET: 120 mL        PHYSICAL EXAM:  GENERAL: nad, resting comfortably in bed  EYES:  NECK:   CHEST/LUNG: ctab no w/r/r  HEART: rrr no m/g/r  ABDOMEN: soft nt nd  EXTREMITIES:  bandaged le bl  PSYCH:   NEUROLOGY:   SKIN:    LABS:                        10.6   15.05 )-----------( 278      ( 21 Sep 2019 05:07 )             33.1     -    135  |  105  |  63<HH>  ----------------------------<  206<H>  4.8   |  19  |  2.1<H>    Ca    9.2      21 Sep 2019 05:07  Phos  4.5     -  Mg     1.9         TPro  4.6<L>  /  Alb  2.3<L>  /  TBili  <0.2  /  DBili  x   /  AST  23  /  ALT  41  /  AlkPhos  190<H>  -    PTT - ( 21 Sep 2019 05:07 )  PTT:97.5 sec  CARDIAC MARKERS ( 20 Sep 2019 20:01 )  x     / x     / 32 U/L / x     / x          Urinalysis Basic - ( 20 Sep 2019 13:20 )    Color: Yellow / Appearance: Turbid / S.010 / pH: x  Gluc: x / Ketone: Negative  / Bili: Negative / Urobili: <2 mg/dL   Blood: x / Protein: 100 mg/dL / Nitrite: Negative   Leuk Esterase: Small / RBC: >720 /HPF / WBC 63 /HPF   Sq Epi: x / Non Sq Epi: 19 /HPF / Bacteria: Negative        RADIOLOGY & ADDITIONAL TESTS:    Imaging Personally Reviewed:  Yes    Consultant(s) Notes Reviewed:      Care Discussed with Consultants/Other Providers:    Assessment and Plan   PAST MEDICAL & SURGICAL HISTORY:  Depression  Chronic kidney disease (CKD)  Dyslipidemia  Hypertension  Diabetes mellitus

## 2019-09-21 NOTE — PROGRESS NOTE ADULT - ASSESSMENT
83F PMHx DM2, ?CKD III, HLD, HTN, hypothyroidism, RA, depression here with generalized weakness found to have 2:1 av block, wenckebach s/p TVP. Cellulitis, LLE, s/p debridement. Emphysematous uti, ecoli.    #2:1 av block, wenckebach, s/p TVP  currently nsr with demand vpacing on tele  f/u eps for ppm  repeat cxr, interrogation per eps  #Cellulitis, LLE, morganella on wcx  cefepime plan to end 9/28  s/p debridement  f/u wcx  wound care, f/u burn  #UTI, emphysematous, ecoli  cefepime as above  #Metabolic acidosis, combined ag and nonag; due to gi losses/ diarrhea  resolved  cont bicarb 650 tid  #RLE DVT  hep gtt  change to eliquis s/p PPM  #Hypothyroidism  synthroid 125  #HTN  hydralazine 25 tid  nifedipine 30  #DM2  lantus 28 increase to 32  humalog 8 tid  ssi  #CKD III  no baseline scr  will confirm with family  scr stable, trend  #RA  prednisone 20  #HLD  lipitor 10  #DVT ppx  hep gtt    #Progress Note Handoff:  Pending (specify):  Consults_________, Tests________, Test Results_______, Other____f/u eps_____  Family discussion:d/w pt and son at bedside re: primary diagnosis, plan of care  Disposition: Home___/SNF___/Other________/Unknown at this time____x____ .

## 2019-09-22 NOTE — PROGRESS NOTE ADULT - SUBJECTIVE AND OBJECTIVE BOX
Patient is a 83y old  Female who presents with a chief complaint of Emphysematous cystitis (21 Sep 2019 11:36)      SUBJECTIVE / OVERNIGHT EVENTS:  no cp, sob, abd pain, fever  back pain, sharp, nonradiating, controlled with medications    MEDICATIONS  (STANDING):  atorvastatin 10 milliGRAM(s) Oral at bedtime  cefepime   IVPB 1000 milliGRAM(s) IV Intermittent every 24 hours  chlorhexidine 4% Liquid 1 Application(s) Topical <User Schedule>  dextrose 5%. 1000 milliLiter(s) (50 mL/Hr) IV Continuous <Continuous>  dextrose 50% Injectable 12.5 Gram(s) IV Push once  dextrose 50% Injectable 25 Gram(s) IV Push once  dextrose 50% Injectable 25 Gram(s) IV Push once  gentamicin 0.1% Ointment 1 Application(s) Topical two times a day  heparin  Infusion 800 Unit(s)/Hr (8 mL/Hr) IV Continuous <Continuous>  hydrALAZINE 25 milliGRAM(s) Oral three times a day  influenza   Vaccine 0.5 milliLiter(s) IntraMuscular once  insulin glargine Injectable (LANTUS) 32 Unit(s) SubCutaneous every morning  insulin lispro Injectable (HumaLOG) 8 Unit(s) SubCutaneous three times a day before meals  levothyroxine 125 MICROGram(s) Oral daily  lidocaine   Patch 1 Patch Transdermal daily  morphine  - Injectable 1 milliGRAM(s) IV Push once  NIFEdipine XL 30 milliGRAM(s) Oral daily  pantoprazole    Tablet 40 milliGRAM(s) Oral before breakfast  predniSONE   Tablet 20 milliGRAM(s) Oral daily  sodium bicarbonate 650 milliGRAM(s) Oral three times a day    MEDICATIONS  (PRN):  acetaminophen   Tablet .. 650 milliGRAM(s) Oral every 6 hours PRN Moderate Pain (4 - 6)  acetaminophen   Tablet .. 650 milliGRAM(s) Oral every 6 hours PRN Mild Pain (1 - 3)  dextrose 40% Gel 15 Gram(s) Oral once PRN Blood Glucose LESS THAN 70 milliGRAM(s)/deciliter  glucagon  Injectable 1 milliGRAM(s) IntraMuscular once PRN Glucose LESS THAN 70 milligrams/deciliter  oxyCODONE    IR 5 milliGRAM(s) Oral every 6 hours PRN Severe Pain (7 - 10)        CAPILLARY BLOOD GLUCOSE      POCT Blood Glucose.: 128 mg/dL (22 Sep 2019 07:39)  POCT Blood Glucose.: 214 mg/dL (21 Sep 2019 21:31)  POCT Blood Glucose.: 314 mg/dL (21 Sep 2019 16:36)  POCT Blood Glucose.: 359 mg/dL (21 Sep 2019 11:24)    I&O's Summary    21 Sep 2019 07:  -  22 Sep 2019 07:00  --------------------------------------------------------  IN: 1117 mL / OUT: 1800 mL / NET: -683 mL    22 Sep 2019 07:01  -  22 Sep 2019 10:46  --------------------------------------------------------  IN: 240 mL / OUT: 0 mL / NET: 240 mL        PHYSICAL EXAM:  GENERAL: nad, a+o x3  EYES:  NECK:   CHEST/LUNG: ctab no w/r/r  HEART: rrr no m/g/r  ABDOMEN: soft nt nd  EXTREMITIES:  bandaged bl le  PSYCH:   NEUROLOGY:   SKIN:    LABS:                        9.5    14.50 )-----------( 251      ( 22 Sep 2019 06:46 )             29.4         137  |  107  |  62<HH>  ----------------------------<  138<H>  5.1<H>   |  21  |  2.1<H>    Ca    8.8      22 Sep 2019 06:46  Phos  4.5       Mg     1.9         TPro  4.1<L>  /  Alb  2.0<L>  /  TBili  <0.2  /  DBili  x   /  AST  33  /  ALT  48<H>  /  AlkPhos  173<H>      PTT - ( 22 Sep 2019 06:46 )  PTT:52.9 sec  CARDIAC MARKERS ( 20 Sep 2019 20:01 )  x     / x     / 32 U/L / x     / x          Urinalysis Basic - ( 20 Sep 2019 13:20 )    Color: Yellow / Appearance: Turbid / S.010 / pH: x  Gluc: x / Ketone: Negative  / Bili: Negative / Urobili: <2 mg/dL   Blood: x / Protein: 100 mg/dL / Nitrite: Negative   Leuk Esterase: Small / RBC: >720 /HPF / WBC 63 /HPF   Sq Epi: x / Non Sq Epi: 19 /HPF / Bacteria: Negative        RADIOLOGY & ADDITIONAL TESTS:    Imaging Personally Reviewed:  Yes    Consultant(s) Notes Reviewed:      Care Discussed with Consultants/Other Providers:    Assessment and Plan   PAST MEDICAL & SURGICAL HISTORY:  Depression  Chronic kidney disease (CKD)  Dyslipidemia  Hypertension  Diabetes mellitus

## 2019-09-22 NOTE — PROGRESS NOTE ADULT - ASSESSMENT
83F PMHx DM2, ?CKD III, HLD, HTN, hypothyroidism, RA, depression here with generalized weakness found to have 2:1 av block, wenckebach s/p TVP. Cellulitis, LLE, s/p debridement. Emphysematous uti, ecoli.    #Sinus pause, 2:1 av block, wenckebach, s/p TVP  currently nsr with demand vpacing on tele  ppm per eps  #Cellulitis, LLE, morganella on wcx, s/p debridement  cefepime plan to end 9/28  wcx with pseudomonas  wound care, f/u burn  #UTI, emphysematous, ecoli  cefepime as above  #Metabolic acidosis, combined ag and nonag; due to gi losses/ diarrhea  resolved  cont bicarb 650 tid  #RLE DVT  hep gtt  change to eliquis s/p PPM  #Hypothyroidism  synthroid 125  #HTN  hydralazine 25 tid  nifedipine 30  #DM2  lantus 32  humalog 8 tid  ssi  #CKD III  no baseline scr  will confirm with family  scr stable, trend  #RA  prednisone 20  #HLD  lipitor 10  #DVT ppx  hep gtt    #Progress Note Handoff:  Pending (specify):  Consults_________, Tests________, Test Results_______, Other____f/u eps_____  Family discussion:d/w pt and son at bedside re: primary diagnosis, plan of care  Disposition: Home___/SNF___/Other________/Unknown at this time____x____ .

## 2019-09-23 NOTE — PROGRESS NOTE ADULT - ASSESSMENT
83F PMHx DM2, ?CKD III, HLD, HTN, hypothyroidism, RA, depression here with generalized weakness found to have 2:1 av block, wenckebach s/p TVP. Cellulitis, LLE, s/p debridement. Emphysematous uti, ecoli.    #Sinus pause, 2:1 av block, wenckebach, s/p TVP  currently nsr with demand vpacing on tele  ppm per eps  #Cellulitis, LLE, morganella on wcx, s/p debridement  cefepime plan to end 9/28  wcx with pseudomonas  wound care, f/u burn  #UTI, emphysematous, ecoli  cefepime as above  #Metabolic acidosis, combined ag and nonag; due to gi losses/ diarrhea  resolved  cont bicarb 650 tid  #RLE DVT  hep gtt  change to eliquis s/p PPM  #Hypothyroidism  synthroid 125  #HTN  hydralazine 25 tid  nifedipine 30  #DM2  lantus 32  humalog 8 tid  ssi  #CKD III  scr stable, trend  #RA  prednisone 20  #HLD  lipitor 10  #DVT ppx  hep gtt    #Progress Note Handoff:  Pending (specify):  Consults_________, Tests________, Test Results_______, Other____f/u eps_____  Family discussion:d/w pt and son at bedside re: primary diagnosis, plan of care  Disposition: Home___/SNF___/Other________/Unknown at this time____x____ .

## 2019-09-23 NOTE — PROGRESS NOTE ADULT - SUBJECTIVE AND OBJECTIVE BOX
seen and examined  no distress  lying comfortable       Standing Inpatient Medications  atorvastatin 10 milliGRAM(s) Oral at bedtime  cefepime   IVPB 1000 milliGRAM(s) IV Intermittent every 24 hours  chlorhexidine 4% Liquid 1 Application(s) Topical <User Schedule>  dextrose 5%. 1000 milliLiter(s) IV Continuous <Continuous>  dextrose 50% Injectable 12.5 Gram(s) IV Push once  dextrose 50% Injectable 25 Gram(s) IV Push once  dextrose 50% Injectable 25 Gram(s) IV Push once  gentamicin 0.1% Ointment 1 Application(s) Topical two times a day  heparin  Infusion 800 Unit(s)/Hr IV Continuous <Continuous>  hydrALAZINE 25 milliGRAM(s) Oral three times a day  influenza   Vaccine 0.5 milliLiter(s) IntraMuscular once  insulin glargine Injectable (LANTUS) 32 Unit(s) SubCutaneous every morning  insulin lispro Injectable (HumaLOG) 8 Unit(s) SubCutaneous three times a day before meals  levothyroxine 125 MICROGram(s) Oral daily  lidocaine   Patch 1 Patch Transdermal daily  NIFEdipine XL 30 milliGRAM(s) Oral daily  pantoprazole    Tablet 40 milliGRAM(s) Oral before breakfast  predniSONE   Tablet 20 milliGRAM(s) Oral daily  sodium bicarbonate 650 milliGRAM(s) Oral three times a day    VITALS/PHYSICAL EXAM  --------------------------------------------------------------------------------  T(C): 35.6 (09-23-19 @ 05:07), Max: 36.8 (09-22-19 @ 12:24)  HR: 60 (09-23-19 @ 05:07) (60 - 76)  BP: 112/53 (09-23-19 @ 05:07) (112/53 - 146/91)  RR: 18 (09-23-19 @ 05:07) (18 - 18)  SpO2: --  Wt(kg): --        09-22-19 @ 07:01  -  09-23-19 @ 07:00  --------------------------------------------------------  IN: 688 mL / OUT: 900 mL / NET: -212 mL      Physical Exam:  	Gen: NAD  	Pulm: CTA B/L  	CV:  S1S2; no rub  	Abd: +distended  	: shirley  	LE: b/l dressings   LABS/STUDIES  --------------------------------------------------------------------------------              9.5    14.50 >-----------<  251      [09-22-19 @ 06:46]              29.4     137  |  107  |  62  ----------------------------<  138      [09-22-19 @ 06:46]  5.1   |  21  |  2.1        Ca     8.8     [09-22-19 @ 06:46]    TPro  4.1  /  Alb  2.0  /  TBili  <0.2  /  DBili  x   /  AST  33  /  ALT  48  /  AlkPhos  173  [09-22-19 @ 06:46]      PTT: 52.9       [09-22-19 @ 06:46]    Creatinine Trend:  SCr 2.1 [09-22 @ 06:46]  SCr 2.1 [09-21 @ 05:07]  SCr 2.2 [09-20 @ 20:01]  SCr 2.2 [09-20 @ 11:44]  SCr 2.2 [09-19 @ 16:49]    Urinalysis - [09-20-19 @ 13:20]      Color Yellow / Appearance Turbid / SG 1.010 / pH 6.0      Gluc 200 mg/dL / Ketone Negative  / Bili Negative / Urobili <2 mg/dL       Blood Small / Protein 100 mg/dL / Leuk Est Small / Nitrite Negative      RBC >720 / WBC 63 / Hyaline 33 / Gran  / Sq Epi  / Non Sq Epi 19 / Bacteria Negative    Urine Creatinine 26      [09-20-19 @ 13:20]  Urine Sodium 90.0      [09-20-19 @ 13:20]  Urine Potassium 20      [09-20-19 @ 13:20]  Urine Chloride 96      [09-20-19 @ 13:20]    HbA1c 10.4      [09-11-19 @ 07:30]  TSH 3.75      [09-13-19 @ 06:09]

## 2019-09-23 NOTE — PROGRESS NOTE ADULT - ASSESSMENT
83y Female with past medical hx of HTN, ckd ( ? BASELINE), TYPE 2 dm, dld admitted initially because of weakness and increased urinary frequency diagnosed as emphymatous cystitis.  # BALDEV on ckd ( baseline ?)    # creatinine stable  # non oliguric  # if BP remains on the low side, d/c hydralazine  # continue sodium bicarbonate po  # check ph level  # will follow

## 2019-09-23 NOTE — PROGRESS NOTE ADULT - ASSESSMENT
83 year old female with PMH of DM II, CKD, DLD, RA, depression brought in by EMS from home for weakness, decreased ambulation, frequent falls and frequent urination. Admitted with multiple comorbidities.    # Bradycardia with Mobitz 1 AV block - s/p temporary external PPM (9/17)  - TSH wnl  - echo- EF 45-50  - no confirmed date for permanent PPM yet  - needs clearance from medicine, burn and ID to proceed    # Bilateral shins ulceration with surrounding cellulitis  - s/p debridement 9/18  - Wound cultures: pseudomonas  - f/u with burn  - Wound care as per burn  - continue with cefepime (ends 9/28)    # UTI, Positive UA with hematuria and increased urinary frequency  - on cefepime now    # Right common femoral DVT   - Vascular consult appreciated - heparin in hospital then Eliquis on discharge; f/u w/in 2 mos w/ vascular  - Heparin for AC until permanent PPM is placed    # BALDEV on CKD 3  - Creatinine stable  - NAGMA - continue Bicarb PO    # Common bile duct choledocholith  - no surgical intervention  - US abdomen: +ve cholelithiasis, no cholecystitis.   - Given no evidence of cholangitis or obstructive sx, would defer intervention in active infection and cardiac comorbidity - ERCP to be considered vs IR biliary drainage should the prior conditions develop    # Rt shoulder pain  - X ray acromioclavicular joint separation of indeterminate age with widening of the AC joint interval measuring 1 cm.   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  - Per ortho: Sling and outpt follow up in 4 weeks, WBAT in RUE    # Weakness and frequent falls  - Likely due to multiple comorbidities  - Trauma workup in ED negative for any fractures  - follow up with PT/rehab after procedure    # DM II  - c/w basal and bolus insulin regimen  - dose adjusted today  - Monitor Fingersticks  - Carb consistent diet    # Rheumatoid arthritis  - On Prednisone 20mg qd  - stable    # DLD - Continue statin    # Elder self-neglect  - poor nutritional status   - Unsafe living condition  - Needs placement    GI ppx: Protonix    DVT ppx/tx: heparin drip, change to eliquis on dc after PPM placement    Dispo: from home, needs placement

## 2019-09-23 NOTE — PROGRESS NOTE ADULT - SUBJECTIVE AND OBJECTIVE BOX
SUBJECTIVE:    Patient is a 83y old Female who presents with a chief complaint of Emphysematous cystitis (23 Sep 2019 09:31)    Currently admitted to medicine with the primary diagnosis of Emphysematous cystitis     Today is hospital day 13d. This morning she is resting comfortably in bed and reports no new issues or overnight events.     INTERVAL EVENTS:     PAST MEDICAL & SURGICAL HISTORY  Depression  Chronic kidney disease (CKD)  Dyslipidemia  Hypertension  Diabetes mellitus      ALLERGIES:  No Known Allergies    MEDICATIONS:  STANDING MEDICATIONS  atorvastatin 10 milliGRAM(s) Oral at bedtime  cefepime   IVPB 1000 milliGRAM(s) IV Intermittent every 24 hours  chlorhexidine 4% Liquid 1 Application(s) Topical <User Schedule>  dextrose 5%. 1000 milliLiter(s) IV Continuous <Continuous>  dextrose 50% Injectable 12.5 Gram(s) IV Push once  dextrose 50% Injectable 25 Gram(s) IV Push once  dextrose 50% Injectable 25 Gram(s) IV Push once  gentamicin 0.1% Ointment 1 Application(s) Topical two times a day  heparin  Infusion 800 Unit(s)/Hr IV Continuous <Continuous>  hydrALAZINE 25 milliGRAM(s) Oral three times a day  influenza   Vaccine 0.5 milliLiter(s) IntraMuscular once  insulin glargine Injectable (LANTUS) 32 Unit(s) SubCutaneous every morning  insulin lispro Injectable (HumaLOG) 8 Unit(s) SubCutaneous three times a day before meals  levothyroxine 125 MICROGram(s) Oral daily  lidocaine   Patch 1 Patch Transdermal daily  NIFEdipine XL 30 milliGRAM(s) Oral daily  pantoprazole    Tablet 40 milliGRAM(s) Oral before breakfast  predniSONE   Tablet 20 milliGRAM(s) Oral daily  sodium bicarbonate 650 milliGRAM(s) Oral three times a day    PRN MEDICATIONS  acetaminophen   Tablet .. 650 milliGRAM(s) Oral every 6 hours PRN  acetaminophen   Tablet .. 650 milliGRAM(s) Oral every 6 hours PRN  dextrose 40% Gel 15 Gram(s) Oral once PRN  glucagon  Injectable 1 milliGRAM(s) IntraMuscular once PRN  morphine  - Injectable 1 milliGRAM(s) IV Push once PRN  oxyCODONE    IR 5 milliGRAM(s) Oral every 6 hours PRN    VITALS:   T(F): 96  HR: 60  BP: 112/53  RR: 18  SpO2: --    LABS:                        9.5    14.50 )-----------( 251      ( 22 Sep 2019 06:46 )             29.4     09-22    137  |  107  |  62<HH>  ----------------------------<  138<H>  5.1<H>   |  21  |  2.1<H>    Ca    8.8      22 Sep 2019 06:46    TPro  4.1<L>  /  Alb  2.0<L>  /  TBili  <0.2  /  DBili  x   /  AST  33  /  ALT  48<H>  /  AlkPhos  173<H>  09-22    PTT - ( 22 Sep 2019 06:46 )  PTT:52.9 sec    RADIOLOGY:    PHYSICAL EXAM:  GEN: No acute distress  PULM/CHEST: Clear to auscultation bilaterally, no rales, rhonchi or wheezes   CVS: Regular rate and rhythm, S1-S2, no murmurs  ABD: Soft, non-tender, non-distended, +BS  EXT: Bilateral lower extremity with dressing, 1-2+ pitting edema  NEURO: AAOx2    Morris Catheter:   Indwelling Urethral Catheter:     Connect To:  Straight Drainage/[place]    Indication:  Urinary Retention / Obstruction (09-21-19 @ 17:20) (not performed) [active]  Indwelling Urethral Catheter:     Connect To:  Straight Drainage/[place]    Indication:  Urinary Retention / Obstruction (09-23-19 @ 00:10) (not performed) [active]

## 2019-09-24 NOTE — PROGRESS NOTE ADULT - SUBJECTIVE AND OBJECTIVE BOX
SUBJECTIVE:    Patient is a 83y old Female who presents with a chief complaint of Emphysematous cystitis (24 Sep 2019 11:31)      HPI:  83 year old female with PMH of DM II, anemia, CKD, DLD, RA, depression brought in by EMS from home for weakness, decreased ambulation, frequent falls and frequent urination. Patient reports that she had increased falls since her left leg is weak and she fell today twice from the chair. Patient is a poor historian. As per ED documentation, unsure if she had head trauma. Patient's neighbor arrived in ED during workup and states that patient is living in a disheveled home with a son who has developmental delay, described them as "hoarders" and since pt's   about 2 years ago, they are not taken care. Pt also complains of increased urinary frequency and constipation.     During workup in ED, code STEMI was called for EKG showing snus bradycardia with 1st degree AV block and ST elevation in I and aVL. Code STEMI was cancelled as pt was asymptomatic and hemodynamically stable.    Trauma workup in ED was negative for any fractures, however several findings were revealed during workup: CT A/P showed emphysematous cystitis, cholelithiasis and 1.6 cm distal CBD choledocholith. Duplex of lower extremities was positive for right common femoral DVT. (10 Sep 2019 03:34)      Currently admitted to medicine with the primary diagnosis of Emphysematous cystitis     pain when changing dressings, continuing to bleed through dressings    Besides the pertinent positives and negatives described above, the ROS was within normal limits.    PAST MEDICAL & SURGICAL HISTORY  Depression  Chronic kidney disease (CKD)  Dyslipidemia  Hypertension  Diabetes mellitus    SOCIAL HISTORY:    ALLERGIES:  No Known Allergies    MEDICATIONS:  STANDING MEDICATIONS  atorvastatin 10 milliGRAM(s) Oral at bedtime  BACItracin   Ointment 1 Application(s) Topical two times a day  cefepime   IVPB 1000 milliGRAM(s) IV Intermittent every 24 hours  chlorhexidine 4% Liquid 1 Application(s) Topical <User Schedule>  dextrose 5%. 1000 milliLiter(s) IV Continuous <Continuous>  dextrose 50% Injectable 12.5 Gram(s) IV Push once  dextrose 50% Injectable 25 Gram(s) IV Push once  dextrose 50% Injectable 25 Gram(s) IV Push once  heparin  Infusion 800 Unit(s)/Hr IV Continuous <Continuous>  hydrALAZINE 25 milliGRAM(s) Oral three times a day  influenza   Vaccine 0.5 milliLiter(s) IntraMuscular once  insulin glargine Injectable (LANTUS) 36 Unit(s) SubCutaneous every morning  insulin lispro Injectable (HumaLOG) 9 Unit(s) SubCutaneous three times a day before meals  levothyroxine 125 MICROGram(s) Oral daily  lidocaine   Patch 1 Patch Transdermal daily  NIFEdipine XL 30 milliGRAM(s) Oral daily  pantoprazole    Tablet 40 milliGRAM(s) Oral before breakfast  predniSONE   Tablet 20 milliGRAM(s) Oral daily  sodium bicarbonate 650 milliGRAM(s) Oral three times a day    PRN MEDICATIONS  acetaminophen   Tablet .. 650 milliGRAM(s) Oral every 6 hours PRN  acetaminophen   Tablet .. 650 milliGRAM(s) Oral every 6 hours PRN  dextrose 40% Gel 15 Gram(s) Oral once PRN  glucagon  Injectable 1 milliGRAM(s) IntraMuscular once PRN  morphine  - Injectable 1 milliGRAM(s) IV Push two times a day PRN  oxyCODONE    IR 5 milliGRAM(s) Oral every 6 hours PRN    VITALS:   T(F): 98.3  HR: 70  BP: 131/63  RR: 18  SpO2: --    LABS:                        8.4    15.15 )-----------( 240      ( 23 Sep 2019 18:10 )             26.0         133<L>  |  104  |  65<HH>  ----------------------------<  332<H>  5.1<H>   |  17  |  2.2<H>    Ca    8.4<L>      23 Sep 2019 18:10    TPro  4.3<L>  /  Alb  2.1<L>  /  TBili  <0.2  /  DBili  x   /  AST  41  /  ALT  71<H>  /  AlkPhos  206<H>      PTT - ( 23 Sep 2019 18:10 )  PTT:82.6 sec              RADIOLOGY:    PHYSICAL EXAM:  GEN: No acute distress  LUNGS: Clear to auscultation bilaterally   HEART: Regular  ABD: Soft, non-tender, non-distended.  EXT: NC/NC/NE/2+PP/LEE/has right leg ulcer  NEURO: AAOX2    Intravenous access: yes  NG tube: no  Morris Catheter:   Indwelling Urethral Catheter:     Connect To:  Straight Drainage/[place]    Indication:  Urinary Retention / Obstruction (19 @ 17:20) (not performed) [active]  Indwelling Urethral Catheter:     Connect To:  Straight Drainage/[place]    Indication:  Urinary Retention / Obstruction (19 @ 06:05) (not performed) [active]

## 2019-09-24 NOTE — PROGRESS NOTE ADULT - SUBJECTIVE AND OBJECTIVE BOX
seen and examined  no distress  lying comfortable       Standing Inpatient Medications  atorvastatin 10 milliGRAM(s) Oral at bedtime  BACItracin   Ointment 1 Application(s) Topical two times a day  cefepime   IVPB 1000 milliGRAM(s) IV Intermittent every 24 hours  chlorhexidine 4% Liquid 1 Application(s) Topical <User Schedule>  dextrose 5%. 1000 milliLiter(s) IV Continuous <Continuous>  dextrose 50% Injectable 12.5 Gram(s) IV Push once  dextrose 50% Injectable 25 Gram(s) IV Push once  dextrose 50% Injectable 25 Gram(s) IV Push once  heparin  Infusion 800 Unit(s)/Hr IV Continuous <Continuous>  hydrALAZINE 25 milliGRAM(s) Oral three times a day  influenza   Vaccine 0.5 milliLiter(s) IntraMuscular once  insulin glargine Injectable (LANTUS) 36 Unit(s) SubCutaneous every morning  insulin lispro Injectable (HumaLOG) 9 Unit(s) SubCutaneous three times a day before meals  levothyroxine 125 MICROGram(s) Oral daily  lidocaine   Patch 1 Patch Transdermal daily  NIFEdipine XL 30 milliGRAM(s) Oral daily  pantoprazole    Tablet 40 milliGRAM(s) Oral before breakfast  predniSONE   Tablet 20 milliGRAM(s) Oral daily  sodium bicarbonate 650 milliGRAM(s) Oral three times a day      VITALS/PHYSICAL EXAM  --------------------------------------------------------------------------------  T(C): 35.8 (09-24-19 @ 05:48), Max: 36.3 (09-23-19 @ 13:00)  HR: 60 (09-24-19 @ 05:48) (60 - 73)  BP: 134/60 (09-24-19 @ 05:48) (119/41 - 137/61)  RR: 18 (09-24-19 @ 05:48) (18 - 18)  SpO2: --  Wt(kg): --        09-23-19 @ 07:01  -  09-24-19 @ 07:00  --------------------------------------------------------  IN: 600 mL / OUT: 600 mL / NET: 0 mL    09-24-19 @ 07:01  -  09-24-19 @ 08:47  --------------------------------------------------------  IN: 280 mL / OUT: 0 mL / NET: 280 mL      Physical Exam:  	Gen: NAD  	Pulm: decrease BS  B/L  	CV: S1S2; no rub  	Abd: +distended  	: shirley  	LE: b/l dressings       LABS/STUDIES  --------------------------------------------------------------------------------              8.4    15.15 >-----------<  240      [09-23-19 @ 18:10]              26.0     133  |  104  |  65  ----------------------------<  332      [09-23-19 @ 18:10]  5.1   |  17  |  2.2        Ca     8.4     [09-23-19 @ 18:10]    TPro  4.3  /  Alb  2.1  /  TBili  <0.2  /  DBili  x   /  AST  41  /  ALT  71  /  AlkPhos  206  [09-23-19 @ 18:10]      PTT: 82.6       [09-23-19 @ 18:10]      Creatinine Trend:  SCr 2.2 [09-23 @ 18:10]  SCr 2.1 [09-22 @ 06:46]  SCr 2.1 [09-21 @ 05:07]  SCr 2.2 [09-20 @ 20:01]  SCr 2.2 [09-20 @ 11:44]    Urinalysis - [09-20-19 @ 13:20]      Color Yellow / Appearance Turbid / SG 1.010 / pH 6.0      Gluc 200 mg/dL / Ketone Negative  / Bili Negative / Urobili <2 mg/dL       Blood Small / Protein 100 mg/dL / Leuk Est Small / Nitrite Negative      RBC >720 / WBC 63 / Hyaline 33 / Gran  / Sq Epi  / Non Sq Epi 19 / Bacteria Negative    Urine Creatinine 26      [09-20-19 @ 13:20]  Urine Sodium 90.0      [09-20-19 @ 13:20]  Urine Potassium 20      [09-20-19 @ 13:20]  Urine Chloride 96      [09-20-19 @ 13:20]    HbA1c 10.4      [09-11-19 @ 07:30]  TSH 3.75      [09-13-19 @ 06:09]

## 2019-09-24 NOTE — PROGRESS NOTE ADULT - ASSESSMENT
83 year old female with PMH of DM II, CKD, DLD, RA, depression brought in by EMS from home for weakness, decreased ambulation, frequent falls and frequent urination. Admitted with multiple comorbidities.  Ulcers on bl lower legs, currently s/p debridement, however bleeding from right leg.  Found to have symptomatic bradycardia, currently s/p external/temporary pacemaker, EP waiting for debridements to be completed before insertion of internal, permanent pacemaker.    # Bradycardia with Mobitz 1 AV block - s/p temporary external PPM (9/17)  - TSH wnl  - echo- EF 45-50  - no confirmed date for permanent PPM yet, f/u burn note tomorrow  - needs clearance from medicine, burn and ID to proceed    # Bilateral shins ulceration with surrounding cellulitis  - s/p debridement 9/18  - Wound cultures: pseudomonas and candida (d/w ID no changes in antimicrobials needed)  - f/u with burn  - Wound care as per burn  - continue with cefepime (ends 9/28)    # UTI, Positive UA with hematuria and increased urinary frequency  - on cefepime now    # Right common femoral DVT   - Vascular consult appreciated - heparin in hospital then Eliquis on discharge; f/u w/in 2 mos w/ vascular  - Heparin for AC until permanent PPM is placed    # BALDEV on CKD 3  - Creatinine stable  - NAGMA - continue Bicarb PO    # Common bile duct choledocholith  - no surgical intervention  - US abdomen: +ve cholelithiasis, no cholecystitis.   - Given no evidence of cholangitis or obstructive sx, would defer intervention in active infection and cardiac comorbidity - ERCP to be considered vs IR biliary drainage should the prior conditions develop    # Rt shoulder pain  - X ray acromioclavicular joint separation of indeterminate age with widening of the AC joint interval measuring 1 cm.   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  - Per ortho: Sling and outpt follow up in 4 weeks, WBAT in RUE    # Weakness and frequent falls  - Likely due to multiple comorbidities  - Trauma workup in ED negative for any fractures  - follow up with PT/rehab after procedure    # DM II  - c/w basal and bolus insulin regimen  - Monitor Fingersticks  - Carb consistent diet    # Rheumatoid arthritis  - On Prednisone 20mg qd  - stable    # DLD - Continue statin    # Elder self-neglect  - poor nutritional status   - Unsafe living condition  - Needs placement    GI ppx: Protonix    DVT ppx/tx: heparin drip, change to eliquis on dc after PPM placement    Dispo: from home, needs placement    PLAN: f/u burn on whether she needs debridement, if not then d/w EP on pacemaker

## 2019-09-24 NOTE — PROGRESS NOTE ADULT - SUBJECTIVE AND OBJECTIVE BOX
Patient is a 83y old  Female who presents with a chief complaint of Emphysematous cystitis (24 Sep 2019 08:47)      SUBJECTIVE / OVERNIGHT EVENTS:  no cp, sob, abd pain, fever  bl le pain, sharp, burning, nonradiating, worsen to touch    MEDICATIONS  (STANDING):  atorvastatin 10 milliGRAM(s) Oral at bedtime  BACItracin   Ointment 1 Application(s) Topical two times a day  cefepime   IVPB 1000 milliGRAM(s) IV Intermittent every 24 hours  chlorhexidine 4% Liquid 1 Application(s) Topical <User Schedule>  dextrose 5%. 1000 milliLiter(s) (50 mL/Hr) IV Continuous <Continuous>  dextrose 50% Injectable 12.5 Gram(s) IV Push once  dextrose 50% Injectable 25 Gram(s) IV Push once  dextrose 50% Injectable 25 Gram(s) IV Push once  heparin  Infusion 800 Unit(s)/Hr (8 mL/Hr) IV Continuous <Continuous>  hydrALAZINE 25 milliGRAM(s) Oral three times a day  influenza   Vaccine 0.5 milliLiter(s) IntraMuscular once  insulin glargine Injectable (LANTUS) 36 Unit(s) SubCutaneous every morning  insulin lispro Injectable (HumaLOG) 9 Unit(s) SubCutaneous three times a day before meals  levothyroxine 125 MICROGram(s) Oral daily  lidocaine   Patch 1 Patch Transdermal daily  NIFEdipine XL 30 milliGRAM(s) Oral daily  pantoprazole    Tablet 40 milliGRAM(s) Oral before breakfast  predniSONE   Tablet 20 milliGRAM(s) Oral daily  sodium bicarbonate 650 milliGRAM(s) Oral three times a day    MEDICATIONS  (PRN):  acetaminophen   Tablet .. 650 milliGRAM(s) Oral every 6 hours PRN Mild Pain (1 - 3)  acetaminophen   Tablet .. 650 milliGRAM(s) Oral every 6 hours PRN Moderate Pain (4 - 6)  dextrose 40% Gel 15 Gram(s) Oral once PRN Blood Glucose LESS THAN 70 milliGRAM(s)/deciliter  glucagon  Injectable 1 milliGRAM(s) IntraMuscular once PRN Glucose LESS THAN 70 milligrams/deciliter  morphine  - Injectable 1 milliGRAM(s) IV Push two times a day PRN Wound dressing change  oxyCODONE    IR 5 milliGRAM(s) Oral every 6 hours PRN Severe Pain (7 - 10)        CAPILLARY BLOOD GLUCOSE      POCT Blood Glucose.: 85 mg/dL (24 Sep 2019 07:34)  POCT Blood Glucose.: 242 mg/dL (23 Sep 2019 23:01)  POCT Blood Glucose.: 448 mg/dL (23 Sep 2019 21:17)  POCT Blood Glucose.: 328 mg/dL (23 Sep 2019 16:43)  POCT Blood Glucose.: 310 mg/dL (23 Sep 2019 11:41)    I&O's Summary    23 Sep 2019 07:01  -  24 Sep 2019 07:00  --------------------------------------------------------  IN: 600 mL / OUT: 600 mL / NET: 0 mL    24 Sep 2019 07:01  -  24 Sep 2019 11:32  --------------------------------------------------------  IN: 280 mL / OUT: 0 mL / NET: 280 mL        PHYSICAL EXAM:  GENERAL: nad, a+o x3  EYES:  NECK:   CHEST/LUNG: ctab no w/r/r  HEART: rrr no m/g/r  ABDOMEN: soft nt nd  EXTREMITIES:  bandaged bl le  PSYCH:   NEUROLOGY:   SKIN:    LABS:                        8.4    15.15 )-----------( 240      ( 23 Sep 2019 18:10 )             26.0     09-23    133<L>  |  104  |  65<HH>  ----------------------------<  332<H>  5.1<H>   |  17  |  2.2<H>    Ca    8.4<L>      23 Sep 2019 18:10    TPro  4.3<L>  /  Alb  2.1<L>  /  TBili  <0.2  /  DBili  x   /  AST  41  /  ALT  71<H>  /  AlkPhos  206<H>  09-23    PTT - ( 23 Sep 2019 18:10 )  PTT:82.6 sec          RADIOLOGY & ADDITIONAL TESTS:    Imaging Personally Reviewed:  Yes    Consultant(s) Notes Reviewed:      Care Discussed with Consultants/Other Providers:    Assessment and Plan   PAST MEDICAL & SURGICAL HISTORY:  Depression  Chronic kidney disease (CKD)  Dyslipidemia  Hypertension  Diabetes mellitus

## 2019-09-24 NOTE — PROGRESS NOTE ADULT - ASSESSMENT
83F PMHx DM2, ?CKD III, HLD, HTN, hypothyroidism, RA, depression here with generalized weakness found to have 2:1 av block, wenckebach s/p TVP. Cellulitis, LLE, s/p debridement. Emphysematous uti, ecoli.    #Sinus pause, 2:1 av block, wenckebach, s/p TVP  currently nsr with demand vpacing on tele  ppm per eps  #Cellulitis, LLE, morganella on wcx, s/p debridement  cefepime plan to end 9/28  wcx with pseudomonas  wound care, f/u burn  #UTI, emphysematous, ecoli  cefepime as above  #Metabolic acidosis, combined ag and nonag; due to gi losses/ diarrhea  resolved  cont bicarb 650 tid  #RLE DVT  hep gtt  change to eliquis s/p PPM  #Hypothyroidism  synthroid 125  #HTN  hydralazine 25 tid  nifedipine 30  #DM2  lantus 36  humalog 9 tid  ssi  #CKD III  scr stable, trend  #RA  prednisone 20  #HLD  lipitor 10  #DVT ppx  hep gtt    #Progress Note Handoff:  Pending (specify):  Consults_________, Tests________, Test Results_______, Other____f/u eps_____  Family discussion:d/w pt and son at bedside re: primary diagnosis, plan of care  Disposition: Home___/SNF___/Other________/Unknown at this time____x____ .

## 2019-09-24 NOTE — PROGRESS NOTE ADULT - ASSESSMENT
83y Female with past medical hx of HTN, ckd ( ? BASELINE), TYPE 2 dm, dld admitted initially because of weakness and increased urinary frequency diagnosed as emphymatous cystitis.  # BALDEV on ckd ( baseline ?)    # creatinine stable  # non oliguric  # BP improved   # continue sodium bicarbonate po  # check ph level  # h/h noted, check iron stores, K/L, IF   # will follow

## 2019-09-25 NOTE — PROGRESS NOTE ADULT - ASSESSMENT
bilateral lower leg wounds--> healing--    rec: soap and water qd, santyl ointment and xeroform gauze dresssing change bid

## 2019-09-25 NOTE — PROGRESS NOTE ADULT - SUBJECTIVE AND OBJECTIVE BOX
SUBJECTIVE:    Patient is a 83y old Female who presents with a chief complaint of Emphysematous cystitis (25 Sep 2019 11:11)      HPI:  83 year old female with PMH of DM II, anemia, CKD, DLD, RA, depression brought in by EMS from home for weakness, decreased ambulation, frequent falls and frequent urination. Patient reports that she had increased falls since her left leg is weak and she fell today twice from the chair. Patient is a poor historian. As per ED documentation, unsure if she had head trauma. Patient's neighbor arrived in ED during workup and states that patient is living in a disheveled home with a son who has developmental delay, described them as "hoarders" and since pt's   about 2 years ago, they are not taken care. Pt also complains of increased urinary frequency and constipation.     During workup in ED, code STEMI was called for EKG showing snus bradycardia with 1st degree AV block and ST elevation in I and aVL. Code STEMI was cancelled as pt was asymptomatic and hemodynamically stable.    Trauma workup in ED was negative for any fractures, however several findings were revealed during workup: CT A/P showed emphysematous cystitis, cholelithiasis and 1.6 cm distal CBD choledocholith. Duplex of lower extremities was positive for right common femoral DVT. (10 Sep 2019 03:34)      Currently admitted to medicine with the primary diagnosis of Emphysematous cystitis     was hypoglycemic this morning, received d10 and was more alert    Besides the pertinent positives and negatives described above, the ROS was within normal limits.    PAST MEDICAL & SURGICAL HISTORY  Depression  Chronic kidney disease (CKD)  Dyslipidemia  Hypertension  Diabetes mellitus    SOCIAL HISTORY:    ALLERGIES:  No Known Allergies    MEDICATIONS:  STANDING MEDICATIONS  atorvastatin 10 milliGRAM(s) Oral at bedtime  cefepime   IVPB 1000 milliGRAM(s) IV Intermittent every 24 hours  chlorhexidine 4% Liquid 1 Application(s) Topical <User Schedule>  collagenase Ointment 1 Application(s) Topical two times a day  dextrose 10%. 250 milliLiter(s) IV Continuous <Continuous>  dextrose 5%. 1000 milliLiter(s) IV Continuous <Continuous>  dextrose 50% Injectable 12.5 Gram(s) IV Push once  dextrose 50% Injectable 25 Gram(s) IV Push once  dextrose 50% Injectable 25 Gram(s) IV Push once  heparin  Infusion 800 Unit(s)/Hr IV Continuous <Continuous>  hydrALAZINE 25 milliGRAM(s) Oral three times a day  influenza   Vaccine 0.5 milliLiter(s) IntraMuscular once  insulin glargine Injectable (LANTUS) 36 Unit(s) SubCutaneous every morning  insulin lispro Injectable (HumaLOG) 9 Unit(s) SubCutaneous three times a day before meals  levothyroxine 125 MICROGram(s) Oral daily  lidocaine   Patch 1 Patch Transdermal daily  NIFEdipine XL 30 milliGRAM(s) Oral daily  pantoprazole    Tablet 40 milliGRAM(s) Oral before breakfast  predniSONE   Tablet 20 milliGRAM(s) Oral daily  sodium bicarbonate 650 milliGRAM(s) Oral three times a day    PRN MEDICATIONS  acetaminophen   Tablet .. 650 milliGRAM(s) Oral every 6 hours PRN  acetaminophen   Tablet .. 650 milliGRAM(s) Oral every 6 hours PRN  dextrose 40% Gel 15 Gram(s) Oral once PRN  glucagon  Injectable 1 milliGRAM(s) IntraMuscular once PRN  morphine  - Injectable 1 milliGRAM(s) IV Push two times a day PRN  oxyCODONE    IR 5 milliGRAM(s) Oral every 6 hours PRN    VITALS:   T(F): 96.4  HR: 84  BP: 113/54  RR: 18  SpO2: --    LABS:                        8.4    15.15 )-----------( 240      ( 23 Sep 2019 18:10 )             26.0         133<L>  |  104  |  65<HH>  ----------------------------<  332<H>  5.1<H>   |  17  |  2.2<H>    Ca    8.4<L>      23 Sep 2019 18:10  Phos  4.4         TPro  4.3<L>  /  Alb  2.1<L>  /  TBili  <0.2  /  DBili  x   /  AST  41  /  ALT  71<H>  /  AlkPhos  206<H>      PTT - ( 23 Sep 2019 18:10 )  PTT:82.6 sec              RADIOLOGY:    PHYSICAL EXAM:  GEN: No acute distress, anxiety  LUNGS: Clear to auscultation bilaterally   HEART: Regular  ABD: Soft, non-tender, non-distended.  EXT: NC/NC/+2 edema right arm likely from infiltration/2+PP/LEE/right lower leg ulcer  NEURO: AAOX2    Intravenous access: yes   NG tube: no  Morris Catheter:   Indwelling Urethral Catheter:     Connect To:  Straight Drainage/[place]    Indication:  Urinary Retention / Obstruction (19 @ 17:20) (not performed) [active]  Indwelling Urethral Catheter:     Connect To:  Straight Drainage/[place]    Indication:  Urinary Retention / Obstruction (19 @ 06:05) (not performed) [active]

## 2019-09-25 NOTE — PROGRESS NOTE ADULT - SUBJECTIVE AND OBJECTIVE BOX
Nephrology progress note    THIS IS AN INCOMPLETE NOTE . FULL NOTE TO FOLLOW SHORTLY    Patient is seen and examined, events over the last 24 h noted .    Allergies:  No Known Allergies    Hospital Medications:   MEDICATIONS  (STANDING):  atorvastatin 10 milliGRAM(s) Oral at bedtime  cefepime   IVPB 1000 milliGRAM(s) IV Intermittent every 24 hours  chlorhexidine 4% Liquid 1 Application(s) Topical <User Schedule>  collagenase Ointment 1 Application(s) Topical two times a day  dextrose 10%. 250 milliLiter(s) (1000 mL/Hr) IV Continuous <Continuous>  dextrose 5%. 1000 milliLiter(s) (50 mL/Hr) IV Continuous <Continuous>  dextrose 50% Injectable 12.5 Gram(s) IV Push once  dextrose 50% Injectable 25 Gram(s) IV Push once  dextrose 50% Injectable 25 Gram(s) IV Push once  heparin  Infusion 800 Unit(s)/Hr (8 mL/Hr) IV Continuous <Continuous>  hydrALAZINE 25 milliGRAM(s) Oral three times a day  influenza   Vaccine 0.5 milliLiter(s) IntraMuscular once  insulin glargine Injectable (LANTUS) 36 Unit(s) SubCutaneous every morning  insulin lispro Injectable (HumaLOG) 9 Unit(s) SubCutaneous three times a day before meals  levothyroxine 125 MICROGram(s) Oral daily  lidocaine   Patch 1 Patch Transdermal daily  NIFEdipine XL 30 milliGRAM(s) Oral daily  pantoprazole    Tablet 40 milliGRAM(s) Oral before breakfast  predniSONE   Tablet 20 milliGRAM(s) Oral daily  sodium bicarbonate 650 milliGRAM(s) Oral three times a day        VITALS:  T(F): 96.4 (19 @ 05:58), Max: 98.3 (19 @ 14:21)  HR: 84 (19 @ 05:58)  BP: 113/54 (19 @ 05:58)  RR: 18 (19 @ 05:58)  SpO2: --  Wt(kg): --     @ 07:01  -   @ 07:00  --------------------------------------------------------  IN: 600 mL / OUT: 600 mL / NET: 0 mL     @ 07:01  -   @ 07:00  --------------------------------------------------------  IN: 540 mL / OUT: 550 mL / NET: -10 mL     @ 07: @ 09:46  --------------------------------------------------------  IN: 240 mL / OUT: 0 mL / NET: 240 mL          PHYSICAL EXAM:  Constitutional: NAD  HEENT: anicteric sclera, oropharynx clear, MMM  Neck: No JVD  Respiratory: CTAB, no wheezes, rales or rhonchi  Cardiovascular: S1, S2, RRR  Gastrointestinal: BS+, soft, NT/ND  Extremities: No cyanosis or clubbing. No peripheral edema  :  No watson.   Skin: No rashes    LABS:      133<L>  |  104  |  65<HH>  ----------------------------<  332<H>  5.1<H>   |  17  |  2.2<H>  Creatinine Trend: 2.2<--, 2.1<--, 2.1<--, 2.2<--, 2.2<--, 2.2<--  Ca    8.4<L>      23 Sep 2019 18:10  Phos  4.4         TPro  4.3<L>  /  Alb  2.1<L>  /  TBili  <0.2  /  DBili      /  AST  41  /  ALT  71<H>  /  AlkPhos  206<H>                            8.4    15.15 )-----------( 240      ( 23 Sep 2019 18:10 )             26.0       Urine Studies:  Urinalysis Basic - ( 20 Sep 2019 13:20 )    Color: Yellow / Appearance: Turbid / S.010 / pH:   Gluc:  / Ketone: Negative  / Bili: Negative / Urobili: <2 mg/dL   Blood:  / Protein: 100 mg/dL / Nitrite: Negative   Leuk Esterase: Small / RBC: >720 /HPF / WBC 63 /HPF   Sq Epi:  / Non Sq Epi: 19 /HPF / Bacteria: Negative      Chloride, Random Urine: 96 ( @ 13:20)  Creatinine, Random Urine: 26 mg/dL ( @ 13:20)  Sodium, Random Urine: 90.0 mmoL/L ( @ 13:20)  Potassium, Random Urine: 20 mmol/L ( @ 13:20)    RADIOLOGY & ADDITIONAL STUDIES: Nephrology progress note  Patient is seen and examined, events over the last 24 h noted .  confused  lying in bed     Allergies:  No Known Allergies    Hospital Medications:   MEDICATIONS  (STANDING):  atorvastatin 10 milliGRAM(s) Oral at bedtime  cefepime   IVPB 1000 milliGRAM(s) IV Intermittent every 24 hours  collagenase Ointment 1 Application(s) Topical two times a day  dextrose 10%. 250 milliLiter(s) (1000 mL/Hr) IV Continuous <Continuous>  dextrose 5%. 1000 milliLiter(s) (50 mL/Hr) IV Continuous <Continuous>  heparin  Infusion 800 Unit(s)/Hr (8 mL/Hr) IV Continuous <Continuous>  hydrALAZINE 25 milliGRAM(s) Oral three times a day  influenza   Vaccine 0.5 milliLiter(s) IntraMuscular once  insulin glargine Injectable (LANTUS) 36 Unit(s) SubCutaneous every morning  insulin lispro Injectable (HumaLOG) 9 Unit(s) SubCutaneous three times a day before meals  levothyroxine 125 MICROGram(s) Oral daily  lidocaine   Patch 1 Patch Transdermal daily  NIFEdipine XL 30 milliGRAM(s) Oral daily  pantoprazole    Tablet 40 milliGRAM(s) Oral before breakfast  predniSONE   Tablet 20 milliGRAM(s) Oral daily  sodium bicarbonate 650 milliGRAM(s) Oral three times a day        VITALS:  T(F): 96.4 (19 @ 05:58), Max: 98.3 (19 @ 14:21)  HR: 84 (19 @ 05:58)  BP: 113/54 (19 @ 05:58)  RR: 18 (19 @ 05:58)       @ :  -   @ 07:00  --------------------------------------------------------  IN: 600 mL / OUT: 600 mL / NET: 0 mL     @ 07:01  -   @ 07:00  --------------------------------------------------------  IN: 540 mL / OUT: 550 mL / NET: -10 mL     @ 07:01  -   @ 09:46  --------------------------------------------------------  IN: 240 mL / OUT: 0 mL / NET: 240 mL          PHYSICAL EXAM:  Constitutional: lethargic   HEENT: anicteric sclera, oropharynx clear, MMM  Neck: No JVD  Respiratory: CTAB, no wheezes, rales or rhonchi  Cardiovascular: S1, S2, RRR  Gastrointestinal: BS+, soft, NT/ND  Extremities: No cyanosis or clubbing. No peripheral edema  :  No watson.   Skin: No rashes    LABS:          133<L>  |  104  |  65<HH>  ----------------------------<  332<H>  5.1<H>   |  17  |  2.2<H>  Creatinine Trend: 2.2<--, 2.1<--, 2.1<--, 2.2<--, 2.2<--, 2.2<--  Ca    8.4<L>      23 Sep 2019 18:10  Phos  4.4         TPro  4.3<L>  /  Alb  2.1<L>  /  TBili  <0.2  /  DBili      /  AST  41  /  ALT  71<H>  /  AlkPhos  206<H>                            8.4    15.15 )-----------( 240      ( 23 Sep 2019 18:10 )             26.0       Urine Studies:  Urinalysis Basic - ( 20 Sep 2019 13:20 )    Color: Yellow / Appearance: Turbid / S.010 / pH:   Gluc:  / Ketone: Negative  / Bili: Negative / Urobili: <2 mg/dL   Blood:  / Protein: 100 mg/dL / Nitrite: Negative   Leuk Esterase: Small / RBC: >720 /HPF / WBC 63 /HPF   Sq Epi:  / Non Sq Epi: 19 /HPF / Bacteria: Negative      Chloride, Random Urine: 96 ( @ 13:20)  Creatinine, Random Urine: 26 mg/dL ( @ 13:20)  Sodium, Random Urine: 90.0 mmoL/L ( @ 13:20)  Potassium, Random Urine: 20 mmol/L ( @ 13:20)    RADIOLOGY & ADDITIONAL STUDIES:

## 2019-09-25 NOTE — PROGRESS NOTE ADULT - SUBJECTIVE AND OBJECTIVE BOX
pt seen and examined Tuesday, 9/24    PE: large dressing change--> bilateral lower legs healing--> no infection

## 2019-09-25 NOTE — PROGRESS NOTE ADULT - SUBJECTIVE AND OBJECTIVE BOX
no chest pain   no sob   doing ok     Vital Signs Last 24 Hrs  T(C): 35.8 (25 Sep 2019 05:58), Max: 36.8 (24 Sep 2019 14:21)  T(F): 96.4 (25 Sep 2019 05:58), Max: 98.3 (24 Sep 2019 14:21)  HR: 84 (25 Sep 2019 05:58) (70 - 84)  BP: 113/54 (25 Sep 2019 05:58) (113/54 - 138/67)  BP(mean): --  RR: 18 (25 Sep 2019 05:58) (18 - 18)  SpO2: --    PHYSICAL EXAM:  GENERAL: NAD  Pulm: Clear to auscultation bilaterally; No wheeze  CV: Regular rate and rhythm; No murmurs, rubs, or gallops  GI: Soft, Nontender, Nondistended; Bowel sounds present  EXTREMITIES:  2+ Peripheral Pulses, No clubbing, cyanosis, or edema  PSYCH: AAOx3  NEUROLOGY: non-focal  SKIN: No rashes or lesions                          8.4    15.15 )-----------( 240      ( 23 Sep 2019 18:10 )             26.0     09-23    133<L>  |  104  |  65<HH>  ----------------------------<  332<H>  5.1<H>   |  17  |  2.2<H>    Ca    8.4<L>      23 Sep 2019 18:10  Phos  4.4     09-24    TPro  4.3<L>  /  Alb  2.1<L>  /  TBili  <0.2  /  DBili  x   /  AST  41  /  ALT  71<H>  /  AlkPhos  206<H>  09-23    LIVER FUNCTIONS - ( 23 Sep 2019 18:10 )  Alb: 2.1 g/dL / Pro: 4.3 g/dL / ALK PHOS: 206 U/L / ALT: 71 U/L / AST: 41 U/L / GGT: x           PTT - ( 23 Sep 2019 18:10 )  PTT:82.6 sec      MEDICATIONS  (STANDING):  atorvastatin 10 milliGRAM(s) Oral at bedtime  cefepime   IVPB 1000 milliGRAM(s) IV Intermittent every 24 hours  chlorhexidine 4% Liquid 1 Application(s) Topical <User Schedule>  collagenase Ointment 1 Application(s) Topical two times a day  dextrose 10%. 250 milliLiter(s) (1000 mL/Hr) IV Continuous <Continuous>  dextrose 5%. 1000 milliLiter(s) (50 mL/Hr) IV Continuous <Continuous>  dextrose 50% Injectable 12.5 Gram(s) IV Push once  dextrose 50% Injectable 25 Gram(s) IV Push once  dextrose 50% Injectable 25 Gram(s) IV Push once  heparin  Infusion 800 Unit(s)/Hr (8 mL/Hr) IV Continuous <Continuous>  hydrALAZINE 25 milliGRAM(s) Oral three times a day  influenza   Vaccine 0.5 milliLiter(s) IntraMuscular once  insulin glargine Injectable (LANTUS) 36 Unit(s) SubCutaneous every morning  insulin lispro Injectable (HumaLOG) 9 Unit(s) SubCutaneous three times a day before meals  levothyroxine 125 MICROGram(s) Oral daily  lidocaine   Patch 1 Patch Transdermal daily  NIFEdipine XL 30 milliGRAM(s) Oral daily  pantoprazole    Tablet 40 milliGRAM(s) Oral before breakfast  predniSONE   Tablet 20 milliGRAM(s) Oral daily  sodium bicarbonate 650 milliGRAM(s) Oral three times a day    MEDICATIONS  (PRN):  acetaminophen   Tablet .. 650 milliGRAM(s) Oral every 6 hours PRN Mild Pain (1 - 3)  acetaminophen   Tablet .. 650 milliGRAM(s) Oral every 6 hours PRN Moderate Pain (4 - 6)  dextrose 40% Gel 15 Gram(s) Oral once PRN Blood Glucose LESS THAN 70 milliGRAM(s)/deciliter  glucagon  Injectable 1 milliGRAM(s) IntraMuscular once PRN Glucose LESS THAN 70 milligrams/deciliter  morphine  - Injectable 1 milliGRAM(s) IV Push two times a day PRN Wound dressing change  oxyCODONE    IR 5 milliGRAM(s) Oral every 6 hours PRN Severe Pain (7 - 10) no chest pain   no sob   doing ok     Vital Signs Last 24 Hrs  T(C): 35.8 (25 Sep 2019 05:58), Max: 36.8 (24 Sep 2019 14:21)  T(F): 96.4 (25 Sep 2019 05:58), Max: 98.3 (24 Sep 2019 14:21)  HR: 84 (25 Sep 2019 05:58) (70 - 84)  BP: 113/54 (25 Sep 2019 05:58) (113/54 - 138/67)  BP(mean): --  RR: 18 (25 Sep 2019 05:58) (18 - 18)  SpO2: --    PHYSICAL EXAM:  GENERAL: NAD  Pulm: Clear to auscultation bilaterally; No wheeze  CV: Regular rate and rhythm; No murmurs, rubs, or gallops  GI: Soft, Nontender, Nondistended; Bowel sounds present  EXTREMITIES:  B/L dressings   PSYCH: AAOx3  NEUROLOGY: non-focal  SKIN: No rashes or lesions                          8.4    15.15 )-----------( 240      ( 23 Sep 2019 18:10 )             26.0     09-23    133<L>  |  104  |  65<HH>  ----------------------------<  332<H>  5.1<H>   |  17  |  2.2<H>    Ca    8.4<L>      23 Sep 2019 18:10  Phos  4.4     09-24    TPro  4.3<L>  /  Alb  2.1<L>  /  TBili  <0.2  /  DBili  x   /  AST  41  /  ALT  71<H>  /  AlkPhos  206<H>  09-23    LIVER FUNCTIONS - ( 23 Sep 2019 18:10 )  Alb: 2.1 g/dL / Pro: 4.3 g/dL / ALK PHOS: 206 U/L / ALT: 71 U/L / AST: 41 U/L / GGT: x           PTT - ( 23 Sep 2019 18:10 )  PTT:82.6 sec      MEDICATIONS  (STANDING):  atorvastatin 10 milliGRAM(s) Oral at bedtime  cefepime   IVPB 1000 milliGRAM(s) IV Intermittent every 24 hours  chlorhexidine 4% Liquid 1 Application(s) Topical <User Schedule>  collagenase Ointment 1 Application(s) Topical two times a day  dextrose 10%. 250 milliLiter(s) (1000 mL/Hr) IV Continuous <Continuous>  dextrose 5%. 1000 milliLiter(s) (50 mL/Hr) IV Continuous <Continuous>  dextrose 50% Injectable 12.5 Gram(s) IV Push once  dextrose 50% Injectable 25 Gram(s) IV Push once  dextrose 50% Injectable 25 Gram(s) IV Push once  heparin  Infusion 800 Unit(s)/Hr (8 mL/Hr) IV Continuous <Continuous>  hydrALAZINE 25 milliGRAM(s) Oral three times a day  influenza   Vaccine 0.5 milliLiter(s) IntraMuscular once  insulin glargine Injectable (LANTUS) 36 Unit(s) SubCutaneous every morning  insulin lispro Injectable (HumaLOG) 9 Unit(s) SubCutaneous three times a day before meals  levothyroxine 125 MICROGram(s) Oral daily  lidocaine   Patch 1 Patch Transdermal daily  NIFEdipine XL 30 milliGRAM(s) Oral daily  pantoprazole    Tablet 40 milliGRAM(s) Oral before breakfast  predniSONE   Tablet 20 milliGRAM(s) Oral daily  sodium bicarbonate 650 milliGRAM(s) Oral three times a day    MEDICATIONS  (PRN):  acetaminophen   Tablet .. 650 milliGRAM(s) Oral every 6 hours PRN Mild Pain (1 - 3)  acetaminophen   Tablet .. 650 milliGRAM(s) Oral every 6 hours PRN Moderate Pain (4 - 6)  dextrose 40% Gel 15 Gram(s) Oral once PRN Blood Glucose LESS THAN 70 milliGRAM(s)/deciliter  glucagon  Injectable 1 milliGRAM(s) IntraMuscular once PRN Glucose LESS THAN 70 milligrams/deciliter  morphine  - Injectable 1 milliGRAM(s) IV Push two times a day PRN Wound dressing change  oxyCODONE    IR 5 milliGRAM(s) Oral every 6 hours PRN Severe Pain (7 - 10) no chest pain   no sob     Vital Signs Last 24 Hrs  T(C): 35.8 (25 Sep 2019 05:58), Max: 36.8 (24 Sep 2019 14:21)  T(F): 96.4 (25 Sep 2019 05:58), Max: 98.3 (24 Sep 2019 14:21)  HR: 84 (25 Sep 2019 05:58) (70 - 84)  BP: 113/54 (25 Sep 2019 05:58) (113/54 - 138/67)  BP(mean): --  RR: 18 (25 Sep 2019 05:58) (18 - 18)  SpO2: --    PHYSICAL EXAM:  GENERAL: NAD  Pulm: Clear to auscultation bilaterally; No wheeze  CV: Regular rate and rhythm; No murmurs, rubs, or gallops  GI: Soft, Nontender, Nondistended; Bowel sounds present  EXTREMITIES:  B/L dressings   NEUROLOGY: non-focal  SKIN: No rashes or lesions                          8.4    15.15 )-----------( 240      ( 23 Sep 2019 18:10 )             26.0     09-23    133<L>  |  104  |  65<HH>  ----------------------------<  332<H>  5.1<H>   |  17  |  2.2<H>    Ca    8.4<L>      23 Sep 2019 18:10  Phos  4.4     09-24    TPro  4.3<L>  /  Alb  2.1<L>  /  TBili  <0.2  /  DBili  x   /  AST  41  /  ALT  71<H>  /  AlkPhos  206<H>  09-23    LIVER FUNCTIONS - ( 23 Sep 2019 18:10 )  Alb: 2.1 g/dL / Pro: 4.3 g/dL / ALK PHOS: 206 U/L / ALT: 71 U/L / AST: 41 U/L / GGT: x           PTT - ( 23 Sep 2019 18:10 )  PTT:82.6 sec      MEDICATIONS  (STANDING):  atorvastatin 10 milliGRAM(s) Oral at bedtime  cefepime   IVPB 1000 milliGRAM(s) IV Intermittent every 24 hours  chlorhexidine 4% Liquid 1 Application(s) Topical <User Schedule>  collagenase Ointment 1 Application(s) Topical two times a day  dextrose 10%. 250 milliLiter(s) (1000 mL/Hr) IV Continuous <Continuous>  dextrose 5%. 1000 milliLiter(s) (50 mL/Hr) IV Continuous <Continuous>  dextrose 50% Injectable 12.5 Gram(s) IV Push once  dextrose 50% Injectable 25 Gram(s) IV Push once  dextrose 50% Injectable 25 Gram(s) IV Push once  heparin  Infusion 800 Unit(s)/Hr (8 mL/Hr) IV Continuous <Continuous>  hydrALAZINE 25 milliGRAM(s) Oral three times a day  influenza   Vaccine 0.5 milliLiter(s) IntraMuscular once  insulin glargine Injectable (LANTUS) 36 Unit(s) SubCutaneous every morning  insulin lispro Injectable (HumaLOG) 9 Unit(s) SubCutaneous three times a day before meals  levothyroxine 125 MICROGram(s) Oral daily  lidocaine   Patch 1 Patch Transdermal daily  NIFEdipine XL 30 milliGRAM(s) Oral daily  pantoprazole    Tablet 40 milliGRAM(s) Oral before breakfast  predniSONE   Tablet 20 milliGRAM(s) Oral daily  sodium bicarbonate 650 milliGRAM(s) Oral three times a day    MEDICATIONS  (PRN):  acetaminophen   Tablet .. 650 milliGRAM(s) Oral every 6 hours PRN Mild Pain (1 - 3)  acetaminophen   Tablet .. 650 milliGRAM(s) Oral every 6 hours PRN Moderate Pain (4 - 6)  dextrose 40% Gel 15 Gram(s) Oral once PRN Blood Glucose LESS THAN 70 milliGRAM(s)/deciliter  glucagon  Injectable 1 milliGRAM(s) IntraMuscular once PRN Glucose LESS THAN 70 milligrams/deciliter  morphine  - Injectable 1 milliGRAM(s) IV Push two times a day PRN Wound dressing change  oxyCODONE    IR 5 milliGRAM(s) Oral every 6 hours PRN Severe Pain (7 - 10)

## 2019-09-25 NOTE — PROGRESS NOTE ADULT - ASSESSMENT
83y Female with past medical hx of HTN, ckd ( ? BASELINE), TYPE 2 dm, dld admitted initially because of weakness and increased urinary frequency diagnosed as emphymatous cystitis.  # BALDEV on ckd ( baseline ?)    # creatinine stable  # non oliguric  # BP improved   # continue sodium bicarbonate po  # check ph level  # h/h noted, check iron stores, K/L, IF   # will follow 83y Female with past medical hx of HTN, ckd ( ? BASELINE), TYPE 2 dm, dld admitted initially because of weakness and increased urinary frequency diagnosed as emphymatous cystitis.    # BALDEV on ckd ( baseline ?)    # creatinine stable/ will need repeat blood work today   # non oliguric  # continue sodium bicarbonate po  # IP level noted ok   # h/h noted, check iron stores, K/L, IF   # no need for RRT    # will follow

## 2019-09-25 NOTE — PROGRESS NOTE ADULT - ASSESSMENT
83F PMHx DM2, ?CKD III, HLD, HTN, hypothyroidism, RA, depression here with generalized weakness found to have 2:1 av block, wenckebach s/p TVP. Cellulitis, LLE, s/p debridement. Emphysematous uti, ecoli.    #Sinus pause, 2:1 av block, wenckebach, s/p TVP  spoke to ID and burn and patient is cleared from ID and Burn for PPM.   spoke to EP and said that she may not need PPM. EP will follow up tomorrow     #Cellulitis, LLE, pseudomonas  on wcx, s/p debridement  cefepime plan to end 9/28 per ID   wound care as per burn     #UTI, emphysematous, ecoli  cefepime as above    #Metabolic acidosis, combined ag and nonag; due to gi losses/ diarrhea  resolved  cont bicarb 650 tid    #RLE DVT  hep gtt monitor PTT. check stat   change to eliquis if no PPM planned     #Hypothyroidism  synthroid 125  #HTN  hydralazine 25 tid  nifedipine 30    #DM2  lantus 36  humalog 9 tid  ssi    #CKD III  scr stable, trend  repeat bmp today     #RA  prednisone 20    #HLD  lipitor 10    #DVT ppx  hep gtt    #Progress Note Handoff  Pending (specify):  EP follow up. then pt/rehab eval   Family discussion:patient   Disposition: pt/rehab and possible snf 83F PMHx DM2, ?CKD III, HLD, HTN, hypothyroidism, RA, depression here with generalized weakness found to have 2:1 av block, wenckebach s/p TVP. Cellulitis, LLE, s/p debridement. Emphysematous uti, ecoli.    #Sinus pause, 2:1 av block, wenckebach, s/p TVP  spoke to ID and burn and patient is cleared from ID and Burn for PPM.   spoke to EP and said that she may not need PPM. EP will follow up tomorrow     #Cellulitis, LLE, pseudomonas  on wcx, s/p debridement  cefepime plan to end 9/28 per ID   wound care as per burn     #UTI, emphysematous, ecoli  cefepime as above    #Metabolic acidosis, combined ag and nonag; due to gi losses/ diarrhea  resolved  cont bicarb 650 tid    #Right common femoral vein DVT  hep gtt monitor PTT. check stat   change to eliquis if no PPM planned     #Hypothyroidism  synthroid 125  #HTN  hydralazine 25 tid  nifedipine 30    #DM2  lantus 36  humalog 9 tid  ssi    #CKD III  scr stable, trend  repeat bmp today     #RA  prednisone 20    #HLD  lipitor 10    #DVT ppx  hep gtt    #Progress Note Handoff  Pending (specify):  EP follow up. then pt/rehab eval   Family discussion:patient   Disposition: pt/rehab and possible snf 83F PMHx DM2, ?CKD III, HLD, HTN, hypothyroidism, RA, depression here with generalized weakness found to have 2:1 av block, wenckebach s/p TVP. Cellulitis, LLE, s/p debridement. Emphysematous uti, ecoli.    #Sinus pause, 2:1 av block, wenckebach, s/p TVP  spoke to ID and burn and patient is cleared from ID and Burn for PPM.   spoke to EP and said that she may not need PPM. EP will follow up tomorrow     #Cellulitis, LLE, pseudomonas  on wcx, s/p debridement  cefepime plan to end 9/28 per ID   wound care as per burn     #UTI, emphysematous, ecoli  cefepime as above    #Metabolic acidosis, combined ag and nonag; due to gi losses/ diarrhea  resolved  cont bicarb 650 tid    #Right common femoral vein DVT  hep gtt monitor PTT. check stat   change to eliquis if no PPM planned     #Hypothyroidism  synthroid 125  #HTN  hydralazine 25 tid  nifedipine 30    #DM2  FS on low 100s hold long acting insulin and monitor for now     #CKD III  scr stable, trend  repeat bmp today     #RA  prednisone 20    #HLD  lipitor 10    #DVT ppx  hep gtt    #Progress Note Handoff  Pending (specify):  EP follow up. then pt/rehab eval   Family discussion:patient   Disposition: pt/rehab and possible snf

## 2019-09-25 NOTE — PROGRESS NOTE ADULT - ASSESSMENT
83 year old female with PMH of DM II, CKD, DLD, RA, depression brought in by EMS from home for weakness, decreased ambulation, frequent falls and frequent urination. Admitted with multiple comorbidities.  Ulcers on bl lower legs, currently s/p debridement, however bleeding from right leg.  Found to have symptomatic bradycardia, currently s/p external/temporary pacemaker.  Cleared by ID and burn for surgery for permanent pacemaker.  However, EP considering the possibility that a permanent pacemaker isn't necessary, will f/u    # Bradycardia with Mobitz 1 AV block - s/p temporary external PPM (9/17)  - TSH wnl  - echo- EF 45-50  - no confirmed date for permanent PPM yet, cleared by ID and burn    # Bilateral shins ulceration with surrounding cellulitis  - s/p debridement 9/18  - Wound cultures: pseudomonas and candida (d/w ID no changes in antimicrobials needed)  - Wound care as per burn  - continue with cefepime (ends 9/28)    # UTI, Positive UA with hematuria and increased urinary frequency  - on cefepime now    # Right common femoral DVT   - Vascular consult appreciated - heparin in hospital then Eliquis on discharge; f/u w/in 2 mos w/ vascular  - Heparin for AC until permanent PPM is placed    # BALDEV on CKD 3  - Creatinine stable  - NAGMA - continue Bicarb PO    # Common bile duct choledocholith  - no surgical intervention  - US abdomen: +ve cholelithiasis, no cholecystitis.   - Given no evidence of cholangitis or obstructive sx, would defer intervention in active infection and cardiac comorbidity - ERCP to be considered vs IR biliary drainage should the prior conditions develop    # Rt shoulder pain  - X ray acromioclavicular joint separation of indeterminate age with widening of the AC joint interval measuring 1 cm.   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  - Per ortho: Sling and outpt follow up in 4 weeks, WBAT in RUE    # Weakness and frequent falls  - Likely due to multiple comorbidities  - Trauma workup in ED negative for any fractures  - follow up with PT/rehab after procedure    # DM II  - c/w basal and bolus insulin regimen  - Monitor Fingersticks  - was hypoglycemic today, received D10 in 250 mL bag, decreased insulin to 16/5/5/5  - Carb consistent diet    # Rheumatoid arthritis  - On Prednisone 20mg qd  - stable    # DLD - Continue statin    # Elder self-neglect  - poor nutritional status   - Unsafe living condition  - Needs placement    GI ppx: Protonix    DVT ppx/tx: heparin drip, change to eliquis on dc after PPM placement    Dispo: from home, needs placement    PLAN: f/U with EP if pacemaker necessary

## 2019-09-26 NOTE — PROGRESS NOTE ADULT - SUBJECTIVE AND OBJECTIVE BOX
INTERVAL HPI/OVERNIGHT EVENTS:  Patient interrmitently paced overnight. Otherwise no complaints, Denies palpitations, SOB, chest pain, dizziness, fever, chills, n/v/d/c, dysuria or  hematuria.    MEDICATIONS  (STANDING):  cefepime   IVPB 1000 milliGRAM(s) IV Intermittent every 24 hours  chlorhexidine 4% Liquid 1 Application(s) Topical <User Schedule>  collagenase Ointment 1 Application(s) Topical two times a day  heparin  Infusion 800 Unit(s)/Hr (8 mL/Hr) IV Continuous <Continuous>  influenza   Vaccine 0.5 milliLiter(s) IntraMuscular once  insulin lispro (HumaLOG) corrective regimen sliding scale   SubCutaneous three times a day before meals  levothyroxine 125 MICROGram(s) Oral daily  lidocaine   Patch 1 Patch Transdermal daily  pantoprazole    Tablet 40 milliGRAM(s) Oral before breakfast  predniSONE   Tablet 20 milliGRAM(s) Oral daily  sodium bicarbonate 650 milliGRAM(s) Oral three times a day    MEDICATIONS  (PRN):  dextrose 40% Gel 15 Gram(s) Oral once PRN Blood Glucose LESS THAN 70 milliGRAM(s)/deciliter  glucagon  Injectable 1 milliGRAM(s) IntraMuscular once PRN Glucose LESS THAN 70 milligrams/deciliter  morphine  - Injectable 1 milliGRAM(s) IV Push two times a day PRN Wound dressing change  oxyCODONE    IR 5 milliGRAM(s) Oral every 6 hours PRN Severe Pain (7 - 10)      Allergies    No Known Allergies    REVIEW OF SYSTEMS  A ten-point review of systems was otherwise negative except as noted.    Vital Signs Last 24 Hrs  T(C): 36.1 (26 Sep 2019 13:41), Max: 36.4 (25 Sep 2019 20:50)  T(F): 96.9 (26 Sep 2019 13:41), Max: 97.6 (25 Sep 2019 20:50)  HR: 76 (26 Sep 2019 13:41) (62 - 84)  BP: 140/65 (26 Sep 2019 13:41) (134/63 - 145/82)  BP(mean): --  RR: 18 (26 Sep 2019 13:41) (18 - 18)  SpO2: 99% (25 Sep 2019 18:53) (99% - 99%)    09-25-19 @ 07:01  -  09-26-19 @ 07:00  --------------------------------------------------------  IN: 460 mL / OUT: 1350 mL / NET: -890 mL    09-26-19 @ 07:01  -  09-26-19 @ 16:56  --------------------------------------------------------  IN: 420 mL / OUT: 500 mL / NET: -80 mL        Physical Exam    GENERAL: In no apparent distress, well nourished, and hydrated.  HEART: Regular rate and rhythm; No murmurs, rubs, or gallops.  PULMONARY: Clear to auscultation and perfusion.  No rales, wheezing, or rhonchi bilaterally.  ABDOMEN: Soft, Nontender, Nondistended; Bowel sounds present  EXTREMITIES:  LE with kerlex dressings bilaterally.  No clubbing, cyanosis  NEUROLOGICAL: Grossly nonfocal    LABS:                        10.4   16.01 )-----------( 230      ( 26 Sep 2019 05:52 )             30.3     09-26    133<L>  |  101  |  68<HH>  ----------------------------<  86  5.5<H>   |  19  |  2.3<H>    Ca    8.6      26 Sep 2019 05:52  Phos  4.4     09-24  Mg     1.9     09-26    TPro  4.2<L>  /  Alb  2.2<L>  /  TBili  0.4  /  DBili  x   /  AST  49<H>  /  ALT  72<H>  /  AlkPhos  211<H>  09-26    PTT - ( 26 Sep 2019 05:52 )  PTT:28.4 sec      RADIOLOGY & ADDITIONAL TESTS:  < from: Transthoracic Echocardiogram (09.20.19 @ 10:22) >  Summary:   1. Normal global left ventricular systolic function.   2. Mild left ventricular hypertrophy.   3. Mildly increased LV wall thickness.   4. Normal left ventricular internal cavity size.   5. Spectral Doppler shows impaired relaxation pattern of left   ventricular myocardial filling (Grade I diastolic dysfunction).   6. Mild to moderate mitral valve regurgitation.   7. Structurally normal mitral valve, with normal leaflet excursion.   8. Sclerotic aortic valve with normal opening.    PHYSICIAN INTERPRETATION:  Left Ventricle: The left ventricular internal cavity size is normal. Left   ventricular wall thickness is mildly increased. There is mild left   ventricular hypertrophy. Global LV systolic function was normal. Spectral   Doppler shows impaired relaxation pattern of left ventricular myocardial   filling (Grade I diastolic dysfunction).  Right Ventricle: Normal right ventricular size and function.  Left Atrium: Normal left atrial size.  Right Atrium: Normal right atrial size.  Pericardium: There is no evidence of pericardial effusion.  Mitral Valve: Structurally normal mitral valve, with normal leaflet   excursion. The mitral valve is normal in structure. Mild to moderate   mitral valve regurgitation is seen.  Tricuspid Valve: Structurally normal tricuspid valve, with normal leaflet   excursion. The tricuspid valve is normal in structure. No tricuspid   regurgitation is visualized.  Aortic Valve: The aortic valve is trileaflet. Sclerotic aortic valve with   normal opening. No evidence of aortic valve regurgitation is seen.  Pulmonic Valve: Structurally normal pulmonic valve, with normal leaflet   excursion. The pulmonic valve is normal. No indication of pulmonic valve   regurgitation.  Aorta: The aortic root and ascending aorta are structurally normal, with   no evidence of dilitation.  Pulmonary Artery: The main pulmonary artery is normal in size.    < end of copied text >    < from: 12 Lead ECG (09.19.19 @ 10:30) >  Ventricular Rate 64 BPM    Atrial Rate 64 BPM    P-R Interval 276 ms    QRS Duration 126 ms    Q-T Interval 412 ms    QTC Calculation(Bezet) 425 ms    P Axis 69 degrees    R Axis -33 degrees    T Axis 10 degrees    Diagnosis Line Sinus rhythm with sinus arrhythmia with 1st degree A-V block with frequent   ventricular-paced complexes  Left axis deviation  Left ventricular hypertrophy with QRS widening  Abnormal ECG    < end of copied text >

## 2019-09-26 NOTE — PROGRESS NOTE ADULT - SUBJECTIVE AND OBJECTIVE BOX
SUBJECTIVE:    Patient is a 83y old Female who presents with a chief complaint of Emphysematous cystitis (25 Sep 2019 12:09)      HPI:  83 year old female with PMH of DM II, anemia, CKD, DLD, RA, depression brought in by EMS from home for weakness, decreased ambulation, frequent falls and frequent urination. Patient reports that she had increased falls since her left leg is weak and she fell today twice from the chair. Patient is a poor historian. As per ED documentation, unsure if she had head trauma. Patient's neighbor arrived in ED during workup and states that patient is living in a disheveled home with a son who has developmental delay, described them as "hoarders" and since pt's   about 2 years ago, they are not taken care. Pt also complains of increased urinary frequency and constipation.     During workup in ED, code STEMI was called for EKG showing snus bradycardia with 1st degree AV block and ST elevation in I and aVL. Code STEMI was cancelled as pt was asymptomatic and hemodynamically stable.    Trauma workup in ED was negative for any fractures, however several findings were revealed during workup: CT A/P showed emphysematous cystitis, cholelithiasis and 1.6 cm distal CBD choledocholith. Duplex of lower extremities was positive for right common femoral DVT. (10 Sep 2019 03:34)      Currently admitted to medicine with the primary diagnosis of Emphysematous cystitis     not as lethargic today,     Besides the pertinent positives and negatives described above, the ROS was within normal limits.    PAST MEDICAL & SURGICAL HISTORY  Depression  Chronic kidney disease (CKD)  Dyslipidemia  Hypertension  Diabetes mellitus    SOCIAL HISTORY:    ALLERGIES:  No Known Allergies    MEDICATIONS:  STANDING MEDICATIONS  cefepime   IVPB 1000 milliGRAM(s) IV Intermittent every 24 hours  chlorhexidine 4% Liquid 1 Application(s) Topical <User Schedule>  collagenase Ointment 1 Application(s) Topical two times a day  dextrose 5% + sodium chloride 0.45%. 1000 milliLiter(s) IV Continuous <Continuous>  dextrose 5%. 1000 milliLiter(s) IV Continuous <Continuous>  dextrose 50% Injectable 12.5 Gram(s) IV Push once  dextrose 50% Injectable 25 Gram(s) IV Push once  dextrose 50% Injectable 25 Gram(s) IV Push once  heparin  Infusion 800 Unit(s)/Hr IV Continuous <Continuous>  influenza   Vaccine 0.5 milliLiter(s) IntraMuscular once  insulin lispro (HumaLOG) corrective regimen sliding scale   SubCutaneous three times a day before meals  levothyroxine 125 MICROGram(s) Oral daily  lidocaine   Patch 1 Patch Transdermal daily  pantoprazole    Tablet 40 milliGRAM(s) Oral before breakfast  predniSONE   Tablet 20 milliGRAM(s) Oral daily  sodium bicarbonate 650 milliGRAM(s) Oral three times a day    PRN MEDICATIONS  acetaminophen   Tablet .. 650 milliGRAM(s) Oral every 6 hours PRN  acetaminophen   Tablet .. 650 milliGRAM(s) Oral every 6 hours PRN  dextrose 40% Gel 15 Gram(s) Oral once PRN  glucagon  Injectable 1 milliGRAM(s) IntraMuscular once PRN  morphine  - Injectable 1 milliGRAM(s) IV Push two times a day PRN  oxyCODONE    IR 5 milliGRAM(s) Oral every 6 hours PRN    VITALS:   T(F): 96.8  HR: 84  BP: 134/63  RR: 18  SpO2: 99%    LABS:                        10.4   16.01 )-----------( 230      ( 26 Sep 2019 05:52 )             30.3         133<L>  |  101  |  68<HH>  ----------------------------<  86  5.5<H>   |  19  |  2.3<H>    Ca    8.6      26 Sep 2019 05:52  Phos  4.4       Mg     1.9         TPro  4.2<L>  /  Alb  2.2<L>  /  TBili  0.4  /  DBili  x   /  AST  49<H>  /  ALT  72<H>  /  AlkPhos  211<H>      PTT - ( 26 Sep 2019 05:52 )  PTT:28.4 sec              RADIOLOGY:    PHYSICAL EXAM:  GEN: No acute distress  LUNGS: Clear to auscultation bilaterally   HEART: Regular  ABD: Soft, non-tender, non-distended.  EXT: NC/NC/NE/2+PP/LEE/Skin Intact.   NEURO: AAOX3    Intravenous access:   NG tube:   Morris Catheter:   Indwelling Urethral Catheter:     Connect To:  Straight Drainage/[place]    Indication:  Urinary Retention / Obstruction (19 @ 17:20) (not performed) [active]  Indwelling Urethral Catheter:     Connect To:  Straight Drainage/[place]    Indication:  Urinary Retention / Obstruction (19 @ 06:05) (not performed) [active] SUBJECTIVE:    Patient is a 83y old Female who presents with a chief complaint of Emphysematous cystitis (25 Sep 2019 12:09)      HPI:  83 year old female with PMH of DM II, anemia, CKD, DLD, RA, depression brought in by EMS from home for weakness, decreased ambulation, frequent falls and frequent urination. Patient reports that she had increased falls since her left leg is weak and she fell today twice from the chair. Patient is a poor historian. As per ED documentation, unsure if she had head trauma. Patient's neighbor arrived in ED during workup and states that patient is living in a disheveled home with a son who has developmental delay, described them as "hoarders" and since pt's   about 2 years ago, they are not taken care. Pt also complains of increased urinary frequency and constipation.     During workup in ED, code STEMI was called for EKG showing snus bradycardia with 1st degree AV block and ST elevation in I and aVL. Code STEMI was cancelled as pt was asymptomatic and hemodynamically stable.    Trauma workup in ED was negative for any fractures, however several findings were revealed during workup: CT A/P showed emphysematous cystitis, cholelithiasis and 1.6 cm distal CBD choledocholith. Duplex of lower extremities was positive for right common femoral DVT. (10 Sep 2019 03:34)      Currently admitted to medicine with the primary diagnosis of Emphysematous cystitis     not as lethargic today after transfusions, having right shoulder pain    Besides the pertinent positives and negatives described above, the ROS was within normal limits.    PAST MEDICAL & SURGICAL HISTORY  Depression  Chronic kidney disease (CKD)  Dyslipidemia  Hypertension  Diabetes mellitus    SOCIAL HISTORY:    ALLERGIES:  No Known Allergies    MEDICATIONS:  STANDING MEDICATIONS  cefepime   IVPB 1000 milliGRAM(s) IV Intermittent every 24 hours  chlorhexidine 4% Liquid 1 Application(s) Topical <User Schedule>  collagenase Ointment 1 Application(s) Topical two times a day  dextrose 5% + sodium chloride 0.45%. 1000 milliLiter(s) IV Continuous <Continuous>  dextrose 5%. 1000 milliLiter(s) IV Continuous <Continuous>  dextrose 50% Injectable 12.5 Gram(s) IV Push once  dextrose 50% Injectable 25 Gram(s) IV Push once  dextrose 50% Injectable 25 Gram(s) IV Push once  heparin  Infusion 800 Unit(s)/Hr IV Continuous <Continuous>  influenza   Vaccine 0.5 milliLiter(s) IntraMuscular once  insulin lispro (HumaLOG) corrective regimen sliding scale   SubCutaneous three times a day before meals  levothyroxine 125 MICROGram(s) Oral daily  lidocaine   Patch 1 Patch Transdermal daily  pantoprazole    Tablet 40 milliGRAM(s) Oral before breakfast  predniSONE   Tablet 20 milliGRAM(s) Oral daily  sodium bicarbonate 650 milliGRAM(s) Oral three times a day    PRN MEDICATIONS  acetaminophen   Tablet .. 650 milliGRAM(s) Oral every 6 hours PRN  acetaminophen   Tablet .. 650 milliGRAM(s) Oral every 6 hours PRN  dextrose 40% Gel 15 Gram(s) Oral once PRN  glucagon  Injectable 1 milliGRAM(s) IntraMuscular once PRN  morphine  - Injectable 1 milliGRAM(s) IV Push two times a day PRN  oxyCODONE    IR 5 milliGRAM(s) Oral every 6 hours PRN    VITALS:   T(F): 96.8  HR: 84  BP: 134/63  RR: 18  SpO2: 99%    LABS:                        10.4   16.01 )-----------( 230      ( 26 Sep 2019 05:52 )             30.3         133<L>  |  101  |  68<HH>  ----------------------------<  86  5.5<H>   |  19  |  2.3<H>    Ca    8.6      26 Sep 2019 05:52  Phos  4.4       Mg     1.9         TPro  4.2<L>  /  Alb  2.2<L>  /  TBili  0.4  /  DBili  x   /  AST  49<H>  /  ALT  72<H>  /  AlkPhos  211<H>      PTT - ( 26 Sep 2019 05:52 )  PTT:28.4 sec              RADIOLOGY:    PHYSICAL EXAM:  GEN: No acute distress  LUNGS: Clear to auscultation bilaterally   HEART: Regular  ABD: Soft, non-tender, non-distended.  EXT: NC/NC/right forearm edema likely from infiltation/LEE/right lower leg ulcer   NEURO: AAOX3    Intravenous access: yes  NG tube: no  Morris Catheter:   Indwelling Urethral Catheter:     Connect To:  Straight Drainage/[place]    Indication:  Urinary Retention / Obstruction (19 @ 17:20) (not performed) [active]  Indwelling Urethral Catheter:     Connect To:  Straight Drainage/[place]    Indication:  Urinary Retention / Obstruction (19 @ 06:05) (not performed) [active]

## 2019-09-26 NOTE — PROGRESS NOTE ADULT - ASSESSMENT
83F PMHx DM2, ?CKD III, HLD, HTN, hypothyroidism, RA, depression here with generalized weakness found to have 2:1 av block, wenckebach s/p TVP. Cellulitis, LLE, s/p debridement.    plan :     #Sinus pause, 2:1 av block, wenckebach, s/p TVP  spoke to ID and burn and patient is cleared from ID and Burn for PPM.   spoke to EP and said that she may not need PPM. EP will follow up     #Cellulitis, LLE, pseudomonas  on wcx, s/p debridement  cefepime plan to end 9/28 per ID   wound care as per burn     #transaminitis stable: has cholelithiasis and choledecholithiasis   asymptomatic   seen by GI no intervention unless there is evidence of sepsis or cholangitis   if LFTs get worse then will need repeat US     #massive right cuff tear:   seen by ortho here OPT follow up and WBAT in RUE     #UTI, emphysematous, ecoli  cefepime as above    #Right common femoral vein DVT  resume hep gtt target PTT 60-80 since hgb stable now   will start eliquis once TVP removed and if no plan for ppm     #acute on chronic anemia secondary to bleeding from LE wound resolved:   s/p 2 units of PRBC hgb from 6 to 10 today   monitor   CT abd/pelvis no hematoma     #Hypothyroidism  synthroid     #HTN  BP was low yesterday   hydralazine and nifidipne stopped BP better     #DM2  FS on low 100s hold long acting insulin and monitor for now     #BALDEV on CKD III  scr stable  potassium 5.5 give one dose of kayexalate     #RA  prednisone 20 chronically     #HLD  on hold for elevated LFT;s     #DVT ppx  hep gtt    #Progress Note Handoff  Pending (specify):  EP follow up.   Family discussion: patient   Disposition: snf

## 2019-09-26 NOTE — SWALLOW BEDSIDE ASSESSMENT ADULT - SLP GENERAL OBSERVATIONS
Pt received sitting upright in bed w/ lunch tray in front of her, alert and oriented x3, verbally responsive

## 2019-09-26 NOTE — PROGRESS NOTE ADULT - ASSESSMENT
83y Female with past medical hx of HTN, ckd ( ? BASELINE), TYPE 2 dm, dld admitted initially because of weakness and increased urinary frequency diagnosed as emphymatous cystitis.    # BALDEV on ckd ( baseline ?)    # creatinine stable/  mild hyperK noted,   # non oliguric  # continue sodium bicarbonate po  # IP level noted ok   # h/h noted, check iron stores, K/L, IF   # no need for RRT    d/c planning to SNF  needs renal f/u as outpt

## 2019-09-26 NOTE — SWALLOW BEDSIDE ASSESSMENT ADULT - COMMENTS
pt c/o difficulty chewing d/t edentulous oral cavity. pt reported her dentures are at home. mild oral dysphagia w/o overt s/s penetration/aspiration w/ soft solids

## 2019-09-26 NOTE — CHART NOTE - NSCHARTNOTEFT_GEN_A_CORE
Registered dietitian limited note    Pt. continues to eat with good appetite, no acute GI distress noted, last BM 9/22. 4+ edema to L, R foot, 1+ to L, R arm noted. Weight trends relatively stable. Pressure ulcer to b/l buttocks and R IT noted. No RD intervention at this time. Will continue to monitor pt.

## 2019-09-26 NOTE — PROGRESS NOTE ADULT - SUBJECTIVE AND OBJECTIVE BOX
patient is more awake today and doing better   no pain   no nausea   no vomiting   bleeding       Vital Signs Last 24 Hrs  T(C): 36 (26 Sep 2019 05:46), Max: 36.4 (25 Sep 2019 13:06)  T(F): 96.8 (26 Sep 2019 05:46), Max: 97.6 (25 Sep 2019 13:06)  HR: 84 (26 Sep 2019 06:25) (60 - 84)  BP: 134/63 (26 Sep 2019 05:46) (109/55 - 145/82)  BP(mean): --  RR: 18 (26 Sep 2019 05:46) (18 - 18)  SpO2: 99% (25 Sep 2019 18:53) (99% - 99%)    PHYSICAL EXAM:  GENERAL: NAD,   Pulm Clear to auscultation bilaterally; No wheeze  CV: Regular rate and rhythm; No murmurs, rubs, or gallops  GI: Soft, Nontender, Nondistended; Bowel sounds present  EXTREMITIES:  B/L dressing le   PSYCH: AAOx3  NEUROLOGY: non-focal                          10.4   16.01 )-----------( 230      ( 26 Sep 2019 05:52 )             30.3     09-26    133<L>  |  101  |  68<HH>  ----------------------------<  86  5.5<H>   |  19  |  2.3<H>    Ca    8.6      26 Sep 2019 05:52  Phos  4.4     09-24  Mg     1.9     09-26    TPro  4.2<L>  /  Alb  2.2<L>  /  TBili  0.4  /  DBili  x   /  AST  49<H>  /  ALT  72<H>  /  AlkPhos  211<H>  09-26    LIVER FUNCTIONS - ( 26 Sep 2019 05:52 )  Alb: 2.2 g/dL / Pro: 4.2 g/dL / ALK PHOS: 211 U/L / ALT: 72 U/L / AST: 49 U/L / GGT: x           PTT - ( 26 Sep 2019 05:52 )  PTT:28.4 sec            MEDICATIONS  (STANDING):  cefepime   IVPB 1000 milliGRAM(s) IV Intermittent every 24 hours  chlorhexidine 4% Liquid 1 Application(s) Topical <User Schedule>  collagenase Ointment 1 Application(s) Topical two times a day  dextrose 5% + sodium chloride 0.45%. 1000 milliLiter(s) (50 mL/Hr) IV Continuous <Continuous>  dextrose 5%. 1000 milliLiter(s) (50 mL/Hr) IV Continuous <Continuous>  dextrose 50% Injectable 12.5 Gram(s) IV Push once  dextrose 50% Injectable 25 Gram(s) IV Push once  dextrose 50% Injectable 25 Gram(s) IV Push once  heparin  Infusion 800 Unit(s)/Hr (8 mL/Hr) IV Continuous <Continuous>  influenza   Vaccine 0.5 milliLiter(s) IntraMuscular once  insulin lispro (HumaLOG) corrective regimen sliding scale   SubCutaneous three times a day before meals  levothyroxine 125 MICROGram(s) Oral daily  lidocaine   Patch 1 Patch Transdermal daily  pantoprazole    Tablet 40 milliGRAM(s) Oral before breakfast  predniSONE   Tablet 20 milliGRAM(s) Oral daily  sodium bicarbonate 650 milliGRAM(s) Oral three times a day    MEDICATIONS  (PRN):  acetaminophen   Tablet .. 650 milliGRAM(s) Oral every 6 hours PRN Mild Pain (1 - 3)  acetaminophen   Tablet .. 650 milliGRAM(s) Oral every 6 hours PRN Moderate Pain (4 - 6)  dextrose 40% Gel 15 Gram(s) Oral once PRN Blood Glucose LESS THAN 70 milliGRAM(s)/deciliter  glucagon  Injectable 1 milliGRAM(s) IntraMuscular once PRN Glucose LESS THAN 70 milligrams/deciliter  morphine  - Injectable 1 milliGRAM(s) IV Push two times a day PRN Wound dressing change  oxyCODONE    IR 5 milliGRAM(s) Oral every 6 hours PRN Severe Pain (7 - 10)

## 2019-09-26 NOTE — PROGRESS NOTE ADULT - ASSESSMENT
83 year old female with PMH of DM II, CKD, DLD, RA, depression brought in by EMS from home for weakness, decreased ambulation, frequent falls and frequent urination. Admitted with multiple comorbidities.  Ulcers on bl lower legs, currently s/p debridement, however bleeding from right leg.  Found to have symptomatic bradycardia, currently s/p external/temporary pacemaker.  Cleared by ID and burn for surgery for permanent pacemaker.  However, EP considering the possibility that a permanent pacemaker isn't necessary, will f/u.  Hgb 6.3 9/25/19 received 2 units prbc now 10.4 likely from RLL bleeding, burn notified.    # Bradycardia with Mobitz 1 AV block - s/p temporary external PPM (9/17)  - TSH wnl  - echo- EF 45-50  - no confirmed date for permanent PPM yet, cleared by ID and burn // unlikely to need permanent pacemaker    # Bilateral shins ulceration with surrounding cellulitis  - s/p debridement 9/18  - Wound cultures: pseudomonas and candida (d/w ID no changes in antimicrobials needed)  - Wound care as per burn // recalled because of bleeding in right lower leg burn team d/w attending about thrombin dressing, bleeding currently controlled restarted heparin drip  - continue with cefepime (ends 9/28)    # UTI, Positive UA with hematuria and increased urinary frequency  - on cefepime now    # Right common femoral DVT   - Vascular consult appreciated - heparin in hospital then Eliquis on discharge; f/u w/in 2 mos w/ vascular  - Heparin for AC until permanent PPM is placed v// heparin restarted today f/u 16:00 and 23:30 aptt    # BALDEV on CKD 3  - Creatinine stable  - NAGMA - continue Bicarb PO    # Common bile duct choledocholith  - no surgical intervention  - US abdomen: +ve cholelithiasis, no cholecystitis.   - Given no evidence of cholangitis or obstructive sx, would defer intervention in active infection and cardiac comorbidity - ERCP to be considered vs IR biliary drainage should the prior conditions develop    # Rt shoulder pain  - X ray acromioclavicular joint separation of indeterminate age with widening of the AC joint interval measuring 1 cm.   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  - Per ortho: Sling and outpt follow up in 4 weeks, WBAT in RUE    # Weakness and frequent falls  - Likely due to multiple comorbidities  - Trauma workup in ED negative for any fractures  - follow up with PT/rehab after procedure    # DM II  - due to episodes of hypoglycemia, dc'd basal insulin and only on sliding scale now  - Carb consistent diet    # Rheumatoid arthritis  - On Prednisone 20mg qd  - stable    # DLD - Continue statin    # Elder self-neglect  - poor nutritional status   - Unsafe living condition  - Needs placement    GI ppx: Protonix    DVT ppx/tx: heparin drip    Dispo: from home, needs placement    PLAN: f/U with EP pacemaker likely unnecessary,  f/u 16:00 and 23:30 aptt, AM ordered

## 2019-09-26 NOTE — SWALLOW BEDSIDE ASSESSMENT ADULT - SWALLOW EVAL: DIAGNOSIS
+toleration for thin liquids and mild oral dysphagia for soft solids w/o overt s/s penetration/aspiration. Pt w/ episode of vomiting immediately after PO intake. RN and MD made aware.

## 2019-09-26 NOTE — PROGRESS NOTE ADULT - SUBJECTIVE AND OBJECTIVE BOX
Nephrology progress note    Patient was seen and examined, events over the last 24 h noted .  cr stable    Allergies:  No Known Allergies    Hospital Medications:   MEDICATIONS  (STANDING):  cefepime   IVPB 1000 milliGRAM(s) IV Intermittent every 24 hours  chlorhexidine 4% Liquid 1 Application(s) Topical <User Schedule>  collagenase Ointment 1 Application(s) Topical two times a day  dextrose 5% + sodium chloride 0.45%. 1000 milliLiter(s) (50 mL/Hr) IV Continuous <Continuous>  dextrose 5%. 1000 milliLiter(s) (50 mL/Hr) IV Continuous <Continuous>  dextrose 50% Injectable 12.5 Gram(s) IV Push once  dextrose 50% Injectable 25 Gram(s) IV Push once  dextrose 50% Injectable 25 Gram(s) IV Push once  heparin  Infusion 800 Unit(s)/Hr (8 mL/Hr) IV Continuous <Continuous>  influenza   Vaccine 0.5 milliLiter(s) IntraMuscular once  insulin lispro (HumaLOG) corrective regimen sliding scale   SubCutaneous three times a day before meals  levothyroxine 125 MICROGram(s) Oral daily  lidocaine   Patch 1 Patch Transdermal daily  pantoprazole    Tablet 40 milliGRAM(s) Oral before breakfast  predniSONE   Tablet 20 milliGRAM(s) Oral daily  sodium bicarbonate 650 milliGRAM(s) Oral three times a day        VITALS:  T(F): 96.8 (19 @ 05:46), Max: 97.6 (19 @ 13:06)  HR: 84 (19 @ 06:25)  BP: 134/63 (19 @ 05:46)  RR: 18 (19 @ 05:46)  SpO2: 99% (19 @ 18:53)  Wt(kg): --     @ 07:01  -   @ 07:00  --------------------------------------------------------  IN: 540 mL / OUT: 550 mL / NET: -10 mL     @ 07:01  -   @ 07:00  --------------------------------------------------------  IN: 460 mL / OUT: 1350 mL / NET: -890 mL     @ 07:01  -   @ 12:59  --------------------------------------------------------  IN: 200 mL / OUT: 0 mL / NET: 200 mL          PHYSICAL EXAM:  Constitutional: lethargic   HEENT: anicteric sclera, oropharynx clear, MMM  Neck: No JVD  Respiratory: CTAB, no wheezes, rales or rhonchi  Cardiovascular: S1, S2, RRR  Gastrointestinal: BS+, soft, NT/ND  Extremities: No cyanosis or clubbing. No peripheral edema  :  No watson.   Skin: No rashes      LABS:      133<L>  |  101  |  68<HH>  ----------------------------<  86  5.5<H>   |  19  |  2.3<H>    Ca    8.6      26 Sep 2019 05:52  Phos  4.4       Mg     1.9         TPro  4.2<L>  /  Alb  2.2<L>  /  TBili  0.4  /  DBili      /  AST  49<H>  /  ALT  72<H>  /  AlkPhos  211<H>                            10.4   16.01 )-----------( 230      ( 26 Sep 2019 05:52 )             30.3       Urine Studies:  Urinalysis Basic - ( 20 Sep 2019 13:20 )    Color: Yellow / Appearance: Turbid / S.010 / pH:   Gluc:  / Ketone: Negative  / Bili: Negative / Urobili: <2 mg/dL   Blood:  / Protein: 100 mg/dL / Nitrite: Negative   Leuk Esterase: Small / RBC: >720 /HPF / WBC 63 /HPF   Sq Epi:  / Non Sq Epi: 19 /HPF / Bacteria: Negative      Chloride, Random Urine: 96 ( @ 13:20)  Creatinine, Random Urine: 26 mg/dL ( @ 13:20)  Sodium, Random Urine: 90.0 mmoL/L ( @ 13:20)  Potassium, Random Urine: 20 mmol/L ( @ 13:20)    RADIOLOGY & ADDITIONAL STUDIES:

## 2019-09-26 NOTE — SWALLOW BEDSIDE ASSESSMENT ADULT - SLP PERTINENT HISTORY OF CURRENT PROBLEM
DM II, anemia, CKD, DLD, RA, depression brought in by EMS from home for weakness, decreased ambulation, frequent falls and frequent urination. Pt admitted w/ emphysematous cystitis. Pt found to have 2:1 av block, wenckebach s/p TVP. Cellulitis, LLE, s/p debridement.

## 2019-09-26 NOTE — PROGRESS NOTE ADULT - ASSESSMENT
Impression:     Wenckebach  block and periods of 2:1 block  Bradycardic to 30s when asleep  Intermittently paced overnight    Plan:    - Recommend PPM, date TBD, needs to be off abx for 24 hours then have blood cultures drawn  - Can be discharged if medically stable to rehab with temporary/permanent pacemaker  - Per Burn team, no signs of infection. Would have team reassess legs prior to PPM placement    Will discuss with EP attending Impression:     Wenckebach  block and periods of 2:1 block  Bradycardic to 30s when asleep  Intermittently paced overnight  Now + C.diff     Plan:    - Recommend PPM, date TBD, new C.diff - needs ID clearance  - Can be discharged if medically stable to rehab with temporary/permanent pacemaker  - Per Burn team, no signs of infection. Would have team reassess legs prior to PPM placement

## 2019-09-27 NOTE — PROGRESS NOTE ADULT - SUBJECTIVE AND OBJECTIVE BOX
SUBJECTIVE:    Patient is a 83y old Female who presents with a chief complaint of Emphysematous cystitis (26 Sep 2019 16:49)    Currently admitted to medicine with the primary diagnosis of Emphysematous cystitis     Today is hospital day 17d. This morning she is resting comfortably in bed and reports no new issues or overnight events.     INTERVAL EVENTS: C. diff positive, pending PPM    PAST MEDICAL & SURGICAL HISTORY  Depression  Chronic kidney disease (CKD)  Dyslipidemia  Hypertension  Diabetes mellitus    ALLERGIES:  No Known Allergies    MEDICATIONS:  STANDING MEDICATIONS  cefepime   IVPB 1000 milliGRAM(s) IV Intermittent every 24 hours  chlorhexidine 4% Liquid 1 Application(s) Topical <User Schedule>  collagenase Ointment 1 Application(s) Topical two times a day  dextrose 5%. 1000 milliLiter(s) IV Continuous <Continuous>  dextrose 50% Injectable 12.5 Gram(s) IV Push once  dextrose 50% Injectable 25 Gram(s) IV Push once  dextrose 50% Injectable 25 Gram(s) IV Push once  heparin  Infusion 800 Unit(s)/Hr IV Continuous <Continuous>  influenza   Vaccine 0.5 milliLiter(s) IntraMuscular once  insulin lispro (HumaLOG) corrective regimen sliding scale   SubCutaneous three times a day before meals  levothyroxine 125 MICROGram(s) Oral daily  lidocaine   Patch 1 Patch Transdermal daily  nystatin Powder 1 Application(s) Topical three times a day  pantoprazole    Tablet 40 milliGRAM(s) Oral before breakfast  predniSONE   Tablet 20 milliGRAM(s) Oral daily  sodium bicarbonate 650 milliGRAM(s) Oral three times a day  sodium polystyrene sulfonate Suspension 15 Gram(s) Oral once  vancomycin    Solution 125 milliGRAM(s) Oral every 6 hours    PRN MEDICATIONS  dextrose 40% Gel 15 Gram(s) Oral once PRN  glucagon  Injectable 1 milliGRAM(s) IntraMuscular once PRN  morphine  - Injectable 1 milliGRAM(s) IV Push two times a day PRN    VITALS:   T(F): 96  HR: 84  BP: 147/98  RR: 18  SpO2: --    LABS:                        10.5   15.33 )-----------( 277      ( 27 Sep 2019 05:58 )             31.0     09-27    134<L>  |  100  |  62<HH>  ----------------------------<  134<H>  5.0   |  19  |  2.1<H>    Ca    8.3<L>      27 Sep 2019 05:58  Mg     1.8     09-27    TPro  4.3<L>  /  Alb  2.2<L>  /  TBili  0.3  /  DBili  x   /  AST  33  /  ALT  72<H>  /  AlkPhos  253<H>  09-27    PTT - ( 27 Sep 2019 05:58 )  PTT:93.2 sec    RADIOLOGY:    < from: CT Abdomen and Pelvis No Cont (09.26.19 @ 00:20) >  IMPRESSION:   1.  No evidence of intra or retroperitoneal hematoma.  2.  Small bilateral pleural effusions with absent atelectasis.    < end of copied text >    PHYSICAL EXAM:  GEN: No acute distress  PULM/CHEST: Clear to auscultation bilaterally, no rales, rhonchi or wheezes   CVS: Regular rate and rhythm, S1-S2, no murmurs  ABD: Soft, non-tender, non-distended, hyperactive bowel sounds   EXT: Bilateral lower extremity with dressing, 1-2+ pitting edema  NEURO: AAOx3

## 2019-09-27 NOTE — PROGRESS NOTE ADULT - ASSESSMENT
83F PMHx DM2, ?CKD III, HLD, HTN, hypothyroidism, RA, depression here with generalized weakness found to have 2:1 av block, wenckebach s/p TVP. Cellulitis, LLE, s/p debridement.    plan :     #Sinus pause, 2:1 av block, wenckebach, s/p TVP  per EP needs PPM as per DR Martini when I spoke to her yesterday   patient was initially cleared by ID for PPM but now has positive c-diff will need to recall ID to see when is it safe to proceed with PPM     #C-diff colitis: start po vanco 125 q6h     #Cellulitis, LLE, pseudomonas  on wcx, s/p debridement  cefepime plan to end 9/28 per ID. would stop after todays dose given positive c-diff   wound care as per burn     #transaminitis : has cholelithiasis and choledecholithiasis   asymptomatic   seen by GI no intervention unless there is evidence of sepsis or cholangitis   ALk phos worse today will repeat US RUQ     #massive right cuff tear:   seen by ortho here OPT follow up and WBAT in RUE     #UTI, emphysematous, ecoli  cefepime as above today last day     #Right common femoral vein DVT  resume hep gtt target PTT 60-80 since hgb stable now   will start eliquis after PPM     #acute on chronic anemia secondary to bleeding from LE wound resolved:   s/p 2 units of PRBC on 9/25/19  hgb has been stable   CT abd/pelvis no hematoma     #Hypothyroidism  synthroid     #HTN   BP was low 9/25/19 so we stopped hydralazine and nifidipne   since BP is trending up will resume procardia 60 qday for now       #DM2  on 9/25/19 Blood glucose was low 100's and now in 200s after stopping insulin long acting. will resume lantus at 5 qhs and c/w ISS. monitor and adjust insulin doses as needed     #BALDEV on CKD III  scr stable  potassium 5 today down from 5.5   since now with c-diff would avoid any additional keyexalate. got one dose yesterday     #RA  prednisone 20 chronically     #HLD  on hold for elevated LFT;s     #DVT ppx  hep gtt    #Progress Note Handoff  Pending (specify):  ID follow up to clear for ppm since now with c-diff   Family discussion: patient   Disposition: snf

## 2019-09-27 NOTE — PROGRESS NOTE ADULT - SUBJECTIVE AND OBJECTIVE BOX
no chest pain   no sob   has mild abdominal pain   had diarrhea c-diff positive now     Vital Signs Last 24 Hrs  T(C): 35.6 (27 Sep 2019 05:51), Max: 36.1 (26 Sep 2019 13:41)  T(F): 96 (27 Sep 2019 05:51), Max: 96.9 (26 Sep 2019 13:41)  HR: 84 (27 Sep 2019 05:51) (62 - 84)  BP: 147/98 (27 Sep 2019 05:51) (140/65 - 163/87)  BP(mean): --  RR: 18 (27 Sep 2019 05:51) (18 - 18)  SpO2: --    PHYSICAL EXAM:  GENERAL: NAD,  Pulm: Clear to auscultation bilaterally; No wheeze  CV: Regular rate and rhythm; No murmurs, rubs, or gallops  GI: Soft, Nontender, Nondistended; Bowel sounds present  EXTREMITIES:  B/L dressing LE   NEUROLOGY: non-focal                          10.5   15.33 )-----------( 277      ( 27 Sep 2019 05:58 )             31.0     09-27    134<L>  |  100  |  62<HH>  ----------------------------<  134<H>  5.0   |  19  |  2.1<H>    Ca    8.3<L>      27 Sep 2019 05:58  Mg     1.8     09-27    TPro  4.3<L>  /  Alb  2.2<L>  /  TBili  0.3  /  DBili  x   /  AST  33  /  ALT  72<H>  /  AlkPhos  253<H>  09-27    LIVER FUNCTIONS - ( 27 Sep 2019 05:58 )  Alb: 2.2 g/dL / Pro: 4.3 g/dL / ALK PHOS: 253 U/L / ALT: 72 U/L / AST: 33 U/L / GGT: x           PTT - ( 27 Sep 2019 05:58 )  PTT:93.2 sec        MEDICATIONS  (STANDING):  cefepime   IVPB 1000 milliGRAM(s) IV Intermittent every 24 hours  chlorhexidine 4% Liquid 1 Application(s) Topical <User Schedule>  collagenase Ointment 1 Application(s) Topical two times a day  dextrose 5%. 1000 milliLiter(s) (50 mL/Hr) IV Continuous <Continuous>  dextrose 50% Injectable 12.5 Gram(s) IV Push once  dextrose 50% Injectable 25 Gram(s) IV Push once  dextrose 50% Injectable 25 Gram(s) IV Push once  heparin  Infusion 800 Unit(s)/Hr (8.5 mL/Hr) IV Continuous <Continuous>  influenza   Vaccine 0.5 milliLiter(s) IntraMuscular once  insulin lispro (HumaLOG) corrective regimen sliding scale   SubCutaneous three times a day before meals  levothyroxine 125 MICROGram(s) Oral daily  lidocaine   Patch 1 Patch Transdermal daily  nystatin Powder 1 Application(s) Topical three times a day  pantoprazole    Tablet 40 milliGRAM(s) Oral before breakfast  predniSONE   Tablet 20 milliGRAM(s) Oral daily  sodium bicarbonate 650 milliGRAM(s) Oral three times a day  sodium polystyrene sulfonate Suspension 15 Gram(s) Oral once  vancomycin    Solution 125 milliGRAM(s) Oral every 6 hours    MEDICATIONS  (PRN):  dextrose 40% Gel 15 Gram(s) Oral once PRN Blood Glucose LESS THAN 70 milliGRAM(s)/deciliter  glucagon  Injectable 1 milliGRAM(s) IntraMuscular once PRN Glucose LESS THAN 70 milligrams/deciliter  morphine  - Injectable 1 milliGRAM(s) IV Push two times a day PRN Wound dressing change

## 2019-09-28 NOTE — PROGRESS NOTE ADULT - SUBJECTIVE AND OBJECTIVE BOX
ANGEL FREDI  83y, Female  Allergy: No Known Allergies    Hospital Day: 18d    Patient seen and examined earlier today. No acute events overnight.    PMH/PSH:  PAST MEDICAL & SURGICAL HISTORY:  Depression  Chronic kidney disease (CKD)  Dyslipidemia  Hypertension  Diabetes mellitus    VITALS:  T(F): 98.1 (09-28-19 @ 05:00), Max: 98.1 (09-28-19 @ 05:00)  HR: 92 (09-28-19 @ 05:00)  BP: 131/60 (09-28-19 @ 05:00) (131/60 - 163/62)  RR: 18 (09-28-19 @ 05:00)  SpO2: --    TESTS & MEASUREMENTS:  Weight (Kg):     09-26-19 @ 07:01  -  09-27-19 @ 07:00  --------------------------------------------------------  IN: 420 mL / OUT: 800 mL / NET: -380 mL    09-27-19 @ 07:01  -  09-28-19 @ 07:00  --------------------------------------------------------  IN: 588 mL / OUT: 1050 mL / NET: -462 mL    09-28-19 @ 07:01  -  09-28-19 @ 12:26  --------------------------------------------------------  IN: 200 mL / OUT: 0 mL / NET: 200 mL                        10.8   16.37 )-----------( 316      ( 28 Sep 2019 06:33 )             32.7     PTT - ( 28 Sep 2019 06:33 )  PTT:61.1 sec  09-28    132<L>  |  99  |  65<HH>  ----------------------------<  74  5.4<H>   |  22  |  2.3<H>    Ca    8.3<L>      28 Sep 2019 06:33  Mg     1.7     09-28    TPro  4.2<L>  /  Alb  2.0<L>  /  TBili  0.3  /  DBili  x   /  AST  27  /  ALT  54<H>  /  AlkPhos  241<H>  09-28    LIVER FUNCTIONS - ( 28 Sep 2019 06:33 )  Alb: 2.0 g/dL / Pro: 4.2 g/dL / ALK PHOS: 241 U/L / ALT: 54 U/L / AST: 27 U/L / GGT: x           RECENT DIAGNOSTIC ORDERS:  Activated Partial Thromboplastin Time: Repeat From: 27-Sep-2019 16:23 To: 30-Sep-2019 04:30, Every 1 day(s) Start Date:27-Sep-2019 (09-27-19 @ 16:28)  Complete Blood Count + Automated Diff: Repeat From: 27-Sep-2019 16:23 To: 30-Sep-2019 04:30, Every 1 day(s) (09-27-19 @ 16:28)  Comprehensive Metabolic Panel: Repeat From: 27-Sep-2019 16:24 To: 30-Sep-2019 04:30, Every 1 day(s) (09-27-19 @ 16:28)  Magnesium, Serum: Repeat From: 27-Sep-2019 16:24 To: 30-Sep-2019 04:30, Every 1 day(s) (09-27-19 @ 16:28)    MEDICATIONS:  MEDICATIONS  (STANDING):  chlorhexidine 4% Liquid 1 Application(s) Topical <User Schedule>  collagenase Ointment 1 Application(s) Topical two times a day  dextrose 5%. 1000 milliLiter(s) (50 mL/Hr) IV Continuous <Continuous>  dextrose 50% Injectable 12.5 Gram(s) IV Push once  dextrose 50% Injectable 25 Gram(s) IV Push once  dextrose 50% Injectable 25 Gram(s) IV Push once  heparin  Infusion 800 Unit(s)/Hr (8.5 mL/Hr) IV Continuous <Continuous>  influenza   Vaccine 0.5 milliLiter(s) IntraMuscular once  insulin glargine Injectable (LANTUS) 5 Unit(s) SubCutaneous at bedtime  insulin lispro (HumaLOG) corrective regimen sliding scale   SubCutaneous three times a day before meals  levothyroxine 125 MICROGram(s) Oral daily  lidocaine   Patch 1 Patch Transdermal daily  NIFEdipine XL 60 milliGRAM(s) Oral daily  nystatin Powder 1 Application(s) Topical three times a day  pantoprazole    Tablet 40 milliGRAM(s) Oral before breakfast  predniSONE   Tablet 20 milliGRAM(s) Oral daily  sodium bicarbonate 650 milliGRAM(s) Oral three times a day  vancomycin    Solution 125 milliGRAM(s) Oral every 6 hours    MEDICATIONS  (PRN):  dextrose 40% Gel 15 Gram(s) Oral once PRN Blood Glucose LESS THAN 70 milliGRAM(s)/deciliter  glucagon  Injectable 1 milliGRAM(s) IntraMuscular once PRN Glucose LESS THAN 70 milligrams/deciliter  morphine  - Injectable 1 milliGRAM(s) IV Push two times a day PRN Wound dressing change    HOME MEDICATIONS:  atorvastatin 10 mg oral tablet (09-10)  diclofenac 1% topical gel (09-10)  levothyroxine 125 mcg (0.125 mg) oral tablet (09-10)  losartan 50 mg oral tablet (09-10)  predniSONE 20 mg oral tablet (09-10)    REVIEW OF SYSTEMS:  All other review of systems is negative unless indicated above.     PHYSICAL EXAM:  GENERAL:  NAD/P  HEENT: No Swelling  CHEST/LUNG: Good air entry, No wheezing  HEART: RRR, No murmurs  ABDOMEN: Soft, Bowel sounds present  EXTREMITIES:  No clubbing,

## 2019-09-28 NOTE — PROGRESS NOTE ADULT - ASSESSMENT
83F PMHx DM2, ?CKD III, HLD, HTN, hypothyroidism, RA, depression here with generalized weakness found to have 2:1 av block, wenckebach s/p TVP. Cellulitis, LLE, s/p debridement.    1. 2:1 AV block s/p TVP: Needs PPM per EP sometime next week. ID to be recalled as pt recently tested positive for c.diff.  2. C. diff colitis: Cont PO vanc  3. LLE cellulitis: Pseudomonas on wcx s/p debridement. s/p course of IV cefepime. Cont wound care  4. Transaminitis: Asymptomatic, no intervention per GI unless septic or cholangitis  5. UTI    #transaminitis : has cholelithiasis and choledecholithiasis   asymptomatic   seen by GI no intervention unless there is evidence of sepsis or cholangitis       #UTI, emphysematous, ecoli  cefepime as above today last day     #Right common femoral vein DVT  resume hep gtt target PTT 60-80 since hgb stable now   will start eliquis after PPM     #acute on chronic anemia secondary to bleeding from LE wound resolved:   s/p 2 units of PRBC on 9/25/19  hgb has been stable   CT abd/pelvis no hematoma     #Hypothyroidism  synthroid     #HTN   BP was low 9/25/19 so we stopped hydralazine and nifidipne   since BP is trending up will resume procardia 60 qday for now       #DM2  on 9/25/19 Blood glucose was low 100's and now in 200s after stopping insulin long acting. will resume lantus at 5 qhs and c/w ISS. monitor and adjust insulin doses as needed     #BALDEV on CKD III  scr stable  potassium 5 today down from 5.5   since now with c-diff would avoid any additional keyexalate. got one dose yesterday     #RA  prednisone 20 chronically     #HLD  on hold for elevated LFT;s     #DVT ppx  hep gtt    #Progress Note Handoff  Pending (specify):  ID follow up to clear for ppm since now with c-diff   Family discussion: patient   Disposition: snf 83F PMHx DM2, ?CKD III, HLD, HTN, hypothyroidism, RA, depression here with generalized weakness found to have 2:1 av block, wenckebach s/p TVP. Cellulitis, LLE, s/p debridement.    1. 2:1 AV block s/p TVP: Needs PPM per EP sometime next week. ID to be recalled as pt recently tested positive for c.diff.  2. C. diff colitis: Cont PO vanc  3. LLE cellulitis: Pseudomonas on wcx s/p debridement. s/p course of IV cefepime. Cont wound care  4. Transaminitis: Asymptomatic, no intervention per GI unless septic or cholangitis  5. UTI  6.     #Right common femoral vein DVT  resume hep gtt target PTT 60-80 since hgb stable now   will start eliquis after PPM     #acute on chronic anemia secondary to bleeding from LE wound resolved:   s/p 2 units of PRBC on 9/25/19  hgb has been stable   CT abd/pelvis no hematoma     #Hypothyroidism  synthroid     #HTN   BP was low 9/25/19 so we stopped hydralazine and nifidipne   since BP is trending up will resume procardia 60 qday for now       #DM2  on 9/25/19 Blood glucose was low 100's and now in 200s after stopping insulin long acting. will resume lantus at 5 qhs and c/w ISS. monitor and adjust insulin doses as needed     #BALDEV on CKD III  scr stable  potassium 5 today down from 5.5   since now with c-diff would avoid any additional keyexalate. got one dose yesterday     #RA  prednisone 20 chronically     #HLD  on hold for elevated LFT;s     #DVT ppx  hep gtt    #Progress Note Handoff  Pending (specify):  ID follow up to clear for ppm since now with c-diff   Family discussion: patient   Disposition: snf 83F PMHx DM2, ?CKD III, HLD, HTN, hypothyroidism, RA, depression here with generalized weakness found to have 2:1 av block, wenckebach s/p TVP. Cellulitis, LLE, s/p debridement.    1. 2:1 AV block s/p TVP: Needs PPM per EP sometime next week. ID to be recalled as pt recently tested positive for c.diff.  2. C. diff colitis: Cont PO vanc  3. LLE cellulitis: Pseudomonas on wcx s/p debridement. s/p course of IV cefepime. Cont wound care  4. Transaminitis: Asymptomatic, no intervention per GI unless septic or cholangitis  5. UTI: s/p cefepime  6. Right CFV DFT: On heparin drip. Restart eliquis after PPM  7. Acute on Chronic anemia 2/2 LE wound bleed: s/p 2U PRBC on 09/25. Hg has been stable.  8. Hypothyroidism: Cont Synthroid  9. HTN: Cont Procardia 60 qD. Hydralazine and nifedipine held due to low BP earlier this admission  10: DM2: Lantus 5qhs and ISS. Monitor FS  11. RA: On prednisone 20mg Daily  12. CKD3: Monitor Cr. Cont Sodium bicarb  13. HLD: Statin held for elevated LFTs    GI/DVT PPX    #Progress Note Handoff  Pending (specify): PPM placement  Family discussion: Discussed with family and pt regarding PPM placement  Disposition: TBD

## 2019-09-29 NOTE — PROGRESS NOTE ADULT - SUBJECTIVE AND OBJECTIVE BOX
FREDI ORTIZ  83y, Female  Allergy: No Known Allergies    Hospital Day: 19d    Patient seen and examined earlier today. No acute events overnight.    PMH/PSH:  PAST MEDICAL & SURGICAL HISTORY:  Depression  Chronic kidney disease (CKD)  Dyslipidemia  Hypertension  Diabetes mellitus    VITALS:  T(F): 96.7 (09-29-19 @ 08:30), Max: 96.9 (09-28-19 @ 14:23)  HR: 63 (09-29-19 @ 08:30)  BP: 111/55 (09-29-19 @ 08:30) (111/55 - 130/60)  RR: 17 (09-29-19 @ 08:30)  SpO2: --    TESTS & MEASUREMENTS:  Weight (Kg):     09-27-19 @ 07:01  -  09-28-19 @ 07:00  --------------------------------------------------------  IN: 588 mL / OUT: 1050 mL / NET: -462 mL    09-28-19 @ 07:01  -  09-29-19 @ 07:00  --------------------------------------------------------  IN: 1142 mL / OUT: 280 mL / NET: 862 mL    09-29-19 @ 07:01  -  09-29-19 @ 10:54  --------------------------------------------------------  IN: 120 mL / OUT: 0 mL / NET: 120 mL                        10.9   15.08 )-----------( 337      ( 29 Sep 2019 06:19 )             33.3     PTT - ( 29 Sep 2019 06:19 )  PTT:61.6 sec  09-29    129<L>  |  96<L>  |  74<HH>  ----------------------------<  189<H>  6.2<HH>   |  18  |  2.7<H>    Ca    8.1<L>      29 Sep 2019 06:19  Mg     1.8     09-29    TPro  3.9<L>  /  Alb  1.9<L>  /  TBili  0.3  /  DBili  x   /  AST  22  /  ALT  43<H>  /  AlkPhos  246<H>  09-29    LIVER FUNCTIONS - ( 29 Sep 2019 06:19 )  Alb: 1.9 g/dL / Pro: 3.9 g/dL / ALK PHOS: 246 U/L / ALT: 43 U/L / AST: 22 U/L / GGT: x           RECENT DIAGNOSTIC ORDERS:  Diet, Dysphagia 2 Mechanical Soft-Thin Liquids:   For patients receiving Renal Replacement - No Protein Restr, No Conc K, No Conc Phos, Low Sodium (09-29-19 @ 08:41)  Basic Metabolic Panel: 11:00 (09-29-19 @ 08:41)  Basic Metabolic Panel: 16:00 (09-29-19 @ 08:41)  12 Lead ECG (09-29-19 @ 08:48)    MEDICATIONS:  MEDICATIONS  (STANDING):  chlorhexidine 4% Liquid 1 Application(s) Topical <User Schedule>  collagenase Ointment 1 Application(s) Topical two times a day  dextrose 5%. 1000 milliLiter(s) (50 mL/Hr) IV Continuous <Continuous>  dextrose 50% Injectable 12.5 Gram(s) IV Push once  dextrose 50% Injectable 25 Gram(s) IV Push once  dextrose 50% Injectable 25 Gram(s) IV Push once  dextrose 50% Injectable 50 milliLiter(s) IV Push once  heparin  Infusion 800 Unit(s)/Hr (8.5 mL/Hr) IV Continuous <Continuous>  influenza   Vaccine 0.5 milliLiter(s) IntraMuscular once  insulin glargine Injectable (LANTUS) 5 Unit(s) SubCutaneous at bedtime  insulin lispro (HumaLOG) corrective regimen sliding scale   SubCutaneous three times a day before meals  levothyroxine 125 MICROGram(s) Oral daily  lidocaine   Patch 1 Patch Transdermal daily  NIFEdipine XL 60 milliGRAM(s) Oral daily  nystatin Powder 1 Application(s) Topical three times a day  pantoprazole    Tablet 40 milliGRAM(s) Oral before breakfast  predniSONE   Tablet 20 milliGRAM(s) Oral daily  sodium bicarbonate 650 milliGRAM(s) Oral three times a day  sodium chloride 0.9%. 1000 milliLiter(s) (50 mL/Hr) IV Continuous <Continuous>  vancomycin    Solution 125 milliGRAM(s) Oral every 6 hours    MEDICATIONS  (PRN):  dextrose 40% Gel 15 Gram(s) Oral once PRN Blood Glucose LESS THAN 70 milliGRAM(s)/deciliter  glucagon  Injectable 1 milliGRAM(s) IntraMuscular once PRN Glucose LESS THAN 70 milligrams/deciliter  morphine  - Injectable 1 milliGRAM(s) IV Push two times a day PRN Wound dressing change    HOME MEDICATIONS:  atorvastatin 10 mg oral tablet (09-10)  diclofenac 1% topical gel (09-10)  levothyroxine 125 mcg (0.125 mg) oral tablet (09-10)  losartan 50 mg oral tablet (09-10)  predniSONE 20 mg oral tablet (09-10)    REVIEW OF SYSTEMS:  All other review of systems is negative unless indicated above.     PHYSICAL EXAM:  GENERAL: NAD  HEENT: No Swelling  CHEST/LUNG: Good air entry, No wheezing  HEART: RRR, No murmurs  ABDOMEN: Soft, Bowel sounds present  EXTREMITIES:  No clubbing

## 2019-09-29 NOTE — PROGRESS NOTE ADULT - ASSESSMENT
83F PMHx DM2, ?CKD III, HLD, HTN, hypothyroidism, RA, depression here with generalized weakness found to have 2:1 av block, wenckebach s/p TVP. Cellulitis, LLE, s/p debridement.    1. 2:1 AV block s/p TVP: Needs PPM per EP sometime next week. ID to be recalled as pt recently tested positive for c.diff.  2. C. diff colitis: Cont PO vanc  3. LLE cellulitis: Pseudomonas on wcx s/p debridement. s/p course of IV cefepime. Cont wound care  4. Transaminitis: Asymptomatic, no intervention per GI unless septic or cholangitis  5. UTI: s/p cefepime  6. Right CFV DFT: On heparin drip. Restart eliquis after PPM  7. Acute on Chronic anemia 2/2 LE wound bleed: s/p 2U PRBC on 09/25. Hg has been stable.  8. Hypothyroidism: Cont Synthroid  9. HTN: Cont Procardia 60 qD. Hydralazine and nifedipine held due to low BP earlier this admission  10: DM2: Lantus 5qhs and ISS. Monitor FS  11. RA: On prednisone 20mg Daily  12. BALDEV on CKD3: IVF, Monitor Cr. Cont Sodium bicarb  13. HLD: Statin held for elevated LFTs  14. Hyperkalemia: Ordered fluids, kayexelate, insulin, d50 push. Will repeat BMP in afternoon.    GI/DVT PPX    #Progress Note Handoff  Pending (specify): PPM placement  Family discussion: Discussed with family and pt regarding PPM placement  Disposition: TBD

## 2019-09-30 NOTE — PROGRESS NOTE ADULT - SUBJECTIVE AND OBJECTIVE BOX
Nephrology progress note    THIS IS AN INCOMPLETE NOTE . FULL NOTE TO FOLLOW SHORTLY    Patient is seen and examined, events over the last 24 h noted .    Allergies:  No Known Allergies    Hospital Medications:   MEDICATIONS  (STANDING):  chlorhexidine 4% Liquid 1 Application(s) Topical <User Schedule>  collagenase Ointment 1 Application(s) Topical two times a day  dextrose 10%. 250 milliLiter(s) (1000 mL/Hr) IV Continuous <Continuous>  dextrose 5%. 1000 milliLiter(s) (50 mL/Hr) IV Continuous <Continuous>  dextrose 50% Injectable 12.5 Gram(s) IV Push once  dextrose 50% Injectable 25 Gram(s) IV Push once  dextrose 50% Injectable 25 Gram(s) IV Push once  heparin  Infusion 800 Unit(s)/Hr (8.5 mL/Hr) IV Continuous <Continuous>  influenza   Vaccine 0.5 milliLiter(s) IntraMuscular once  insulin glargine Injectable (LANTUS) 5 Unit(s) SubCutaneous at bedtime  insulin lispro (HumaLOG) corrective regimen sliding scale   SubCutaneous three times a day before meals  insulin regular  human recombinant. 10 Unit(s) IV Push once  levothyroxine 125 MICROGram(s) Oral daily  lidocaine   Patch 1 Patch Transdermal daily  NIFEdipine XL 60 milliGRAM(s) Oral daily  nystatin Powder 1 Application(s) Topical three times a day  pantoprazole    Tablet 40 milliGRAM(s) Oral before breakfast  predniSONE   Tablet 20 milliGRAM(s) Oral daily  sodium bicarbonate 650 milliGRAM(s) Oral three times a day  vancomycin    Solution 125 milliGRAM(s) Oral every 6 hours        VITALS:  T(F): 98 (09-29-19 @ 20:00), Max: 98 (09-29-19 @ 20:00)  HR: 60 (09-29-19 @ 20:00)  BP: 121/78 (09-29-19 @ 20:00)  RR: 18 (09-29-19 @ 20:00)  SpO2: --  Wt(kg): --    09-28 @ 07:01  -  09-29 @ 07:00  --------------------------------------------------------  IN: 1142 mL / OUT: 280 mL / NET: 862 mL    09-29 @ 07:01  -  09-30 @ 07:00  --------------------------------------------------------  IN: 2316 mL / OUT: 130 mL / NET: 2186 mL          PHYSICAL EXAM:  Constitutional: NAD  HEENT: anicteric sclera, oropharynx clear, MMM  Neck: No JVD  Respiratory: CTAB, no wheezes, rales or rhonchi  Cardiovascular: S1, S2, RRR  Gastrointestinal: BS+, soft, NT/ND  Extremities: No cyanosis or clubbing. No peripheral edema  :  No watson.   Skin: No rashes    LABS:  09-30    131<L>  |  98  |  76<HH>  ----------------------------<  83  6.0<HH>   |  18  |  3.1<H>  Creatinine Trend: 3.1<--, 2.6<--, 2.7<--, 2.7<--, 2.3<--, 2.1<--  Potassium Trend: 6.0<--, 5.8<--, 6.1<--, 6.2<--, 5.4<--  Ca    7.8<L>      30 Sep 2019 06:08  Mg     1.9     09-30    TPro  3.8<L>  /  Alb  1.9<L>  /  TBili  0.2  /  DBili      /  AST  31  /  ALT  41  /  AlkPhos  285<H>  09-30                          11.4   19.46 )-----------( 303      ( 30 Sep 2019 06:08 )             33.8       Urine Studies:      RADIOLOGY & ADDITIONAL STUDIES: Nephrology progress note  Patient is seen and examined, events over the last 24 h noted .  Had diarrhea over the weekend  Has C diff and was given SPS  seen today lethargic     Allergies:  No Known Allergies    Hospital Medications:   MEDICATIONS  (STANDING):  collagenase Ointment 1 Application(s) Topical two times a day  dextrose 10%. 250 milliLiter(s) (1000 mL/Hr) IV Continuous <Continuous>  dextrose 5%. 1000 milliLiter(s) (50 mL/Hr) IV Continuous <Continuous>  heparin  Infusion 800 Unit(s)/Hr (8.5 mL/Hr) IV Continuous <Continuous>  influenza   Vaccine 0.5 milliLiter(s) IntraMuscular once  insulin glargine Injectable (LANTUS) 5 Unit(s) SubCutaneous at bedtime  insulin lispro (HumaLOG) corrective regimen sliding scale   SubCutaneous three times a day before meals  insulin regular  human recombinant. 10 Unit(s) IV Push once  levothyroxine 125 MICROGram(s) Oral daily  lidocaine   Patch 1 Patch Transdermal daily  NIFEdipine XL 60 milliGRAM(s) Oral daily  nystatin Powder 1 Application(s) Topical three times a day  pantoprazole    Tablet 40 milliGRAM(s) Oral before breakfast  predniSONE   Tablet 20 milliGRAM(s) Oral daily  sodium bicarbonate 650 milliGRAM(s) Oral three times a day  vancomycin    Solution 125 milliGRAM(s) Oral every 6 hours        VITALS:  T(F): 98 (09-29-19 @ 20:00), Max: 98 (09-29-19 @ 20:00)  HR: 60 (09-29-19 @ 20:00)  BP: 121/78 (09-29-19 @ 20:00)  RR: 18 (09-29-19 @ 20:00)      09-28 @ 07:01  -  09-29 @ 07:00  --------------------------------------------------------  IN: 1142 mL / OUT: 280 mL / NET: 862 mL    09-29 @ 07:01  -  09-30 @ 07:00  --------------------------------------------------------  IN: 2316 mL / OUT: 130 mL / NET: 2186 mL          PHYSICAL EXAM:  Constitutional: lethargic   HEENT: anicteric sclera, oropharynx clear, MMM  Neck: No JVD  Respiratory: CTAB, no wheezes, rales or rhonchi  Cardiovascular: S1, S2, RRR  Gastrointestinal: BS+, soft, NT/ND  Extremities: No cyanosis or clubbing. trace peripheral edema   :  No watson.   Skin: No rashes    LABS:  09-30    131<L>  |  98  |  76<HH>  ----------------------------<  83  6.0<HH>   |  18  |  3.1<H>  Creatinine Trend: 3.1<--, 2.6<--, 2.7<--, 2.7<--, 2.3<--, 2.1<--  Potassium Trend: 6.0<--, 5.8<--, 6.1<--, 6.2<--, 5.4<--  Ca    7.8<L>      30 Sep 2019 06:08  Mg     1.9     09-30    TPro  3.8<L>  /  Alb  1.9<L>  /  TBili  0.2  /  DBili      /  AST  31  /  ALT  41  /  AlkPhos  285<H>  09-30                          11.4   19.46 )-----------( 303      ( 30 Sep 2019 06:08 )             33.8       Urine Studies:      RADIOLOGY & ADDITIONAL STUDIES:

## 2019-09-30 NOTE — PROGRESS NOTE ADULT - ASSESSMENT
83F PMHx DM2, ?CKD III, HLD, HTN, hypothyroidism, RA, depression here with generalized weakness found to have 2:1 av block, wenckebach s/p TVP. Cellulitis, LLE, s/p debridement.    plan :     #Sinus pause, 2:1 av block, wenckebach, s/p TVP  per EP needs PPM as per DR Martini when I spoke to her yesterday   patient was initially cleared by ID for PPM but now has positive c-diff will need to recall ID to see when is it safe to proceed with PPM       #BALDEV on CKD III  scr stable  seems to be oliguric has shirley   renal follow up   potassium 6  today insulin glucose, check EKG   start IVF NS 75/hr   check ck levels   since now with c-diff would avoid any additional keyexalate.    #C-diff colitis: po vanco 125 q6h. check KUB given worsening leukocytosis     #Leukocytosis patient on chronic prednisone 20 qday for RA. also has c-diff     #Cellulitis, LLE, pseudomonas  on wcx, s/p debridement  s/p cefepime ended 9/27 per ID. wound care as per burn     #transaminitis : has cholelithiasis and choledecholithiasis   asymptomatic   seen by GI no intervention unless there is evidence of sepsis or cholangitis     #massive right cuff tear:   seen by ortho here OPT follow up and WBAT in RUE     #UTI, emphysematous, ecoli  cefepime as above today last day     #Right common femoral vein DVT  hep gtt target PTT 60-80   will start eliquis after PPM     #acute on chronic anemia secondary to bleeding from LE wound resolved:   s/p 2 units of PRBC on 9/25/19  hgb has been stable   CT abd/pelvis no hematoma     #Hypothyroidism  synthroid     #HTN controlled on procardia 60       #DM2  on lantus 5 units qhs better controlled this morning monitor FS target 140-180       #RA  prednisone 20 chronically     #HLD  on hold for elevated LFT;s which improved. keep on hold for now     #DVT ppx  hep gtt    #Progress Note Handoff  Pending (specify):  ID follow up to clear for ppm since now with c-diff   Family discussion: patient   Disposition: snf 83F PMHx DM2, ?CKD III, HLD, HTN, hypothyroidism, RA, depression here with generalized weakness found to have 2:1 av block, wenckebach s/p TVP. Cellulitis, LLE, s/p debridement.    plan :     #Sinus pause, 2:1 av block, wenckebach, s/p TVP  per EP needs PPM as per DR Martini when I spoke to her yesterday   patient was initially cleared by ID for PPM but now has positive c-diff will need to recall ID to see when is it safe to proceed with PPM       #BALDEV on CKD III  scr stable  seems to be oliguric has shirley   renal follow up   potassium 6  today insulin glucose, check EKG   start IVF half ns/ds  recheck bmp   check ck levels   since now with c-diff would avoid any additional keyexalate.    #C-diff colitis: po vanco 125 q6h. check KUB given worsening leukocytosis     #Leukocytosis patient on chronic prednisone 20 qday for RA. also has c-diff     #Cellulitis, LLE, pseudomonas  on wcx, s/p debridement  s/p cefepime ended 9/27 per ID. wound care as per burn     #transaminitis : has cholelithiasis and choledecholithiasis   asymptomatic   seen by GI no intervention unless there is evidence of sepsis or cholangitis     #massive right cuff tear:   seen by ortho here OPT follow up and WBAT in RUE     #UTI, emphysematous, ecoli  cefepime as above today last day     #Right common femoral vein DVT  hep gtt target PTT 60-80   will start eliquis after PPM     #acute on chronic anemia secondary to bleeding from LE wound resolved:   s/p 2 units of PRBC on 9/25/19  hgb has been stable   CT abd/pelvis no hematoma     #Hypothyroidism  synthroid     #HTN controlled on procardia 60       #DM2  on lantus 5 units qhs better controlled this morning monitor FS target 140-180       #RA  prednisone 20 chronically     #HLD  on hold for elevated LFT;s which improved. keep on hold for now     #DVT ppx  hep gtt    #Progress Note Handoff  Pending (specify):  ID follow up to clear for ppm since now with c-diff   Family discussion: patient   Disposition: snf

## 2019-09-30 NOTE — PROGRESS NOTE ADULT - ASSESSMENT
83y Female with past medical hx of HTN, ckd ( ? BASELINE), TYPE 2 dm, dld admitted initially because of weakness and increased urinary frequency diagnosed as emphymatous cystitis.    # BALDEV on ckd ( baseline ?)    # Hyperkalemia   # creatinine stable/  # non oliguric  # continue sodium bicarbonate po  # IP level noted ok   # h/h noted, check iron stores, K/L, IF   # no need for RRT    d/c planning to SNF  needs renal f/u as outpt 83y Female with past medical hx of HTN, ckd ( ? BASELINE), TYPE 2 dm, dld admitted initially because of weakness and increased urinary frequency diagnosed as emphymatous cystitis.    # BALDEV on ckd ( baseline ?)  / had diarrhea / positive for C diff   # Hyperkalemia noted over the weekend   # oliguric now   # would start 1/2 NS with 75 meq bicarb at 100 cc pe r hour   # IP level noted ok   # h/h noted, check iron stores, / [place] and lambda noted within target for BALDEV   # no need for RRT yet / will follow closely   # follow repeat BMP

## 2019-09-30 NOTE — PROGRESS NOTE ADULT - SUBJECTIVE AND OBJECTIVE BOX
FREDI ORTIZ 84y Female  MRN#: [id number]         SUBJECTIVE  Patient is a 84y old Female who presents with a chief complaint of Emphysematous cystitis (30 Sep 2019 10:26)  Currently admitted to medicine with the primary diagnosis of Emphysematous cystitis  .  Today is hospital day 20d, and this morning pt continues to have multiple watery bowel movements. Hyperkalemic 6.0 in AM.        OBJECTIVE  PAST MEDICAL & SURGICAL HISTORY  Depression  Chronic kidney disease (CKD)  Dyslipidemia  Hypertension  Diabetes mellitus    ALLERGIES:  No Known Allergies    MEDICATIONS:  STANDING MEDICATIONS  chlorhexidine 4% Liquid 1 Application(s) Topical <User Schedule>  collagenase Ointment 1 Application(s) Topical two times a day  dextrose 10%. 250 milliLiter(s) IV Continuous <Continuous>  dextrose 5% + sodium chloride 0.45% 1000 milliLiter(s) IV Continuous <Continuous>  dextrose 5%. 1000 milliLiter(s) IV Continuous <Continuous>  dextrose 50% Injectable 12.5 Gram(s) IV Push once  dextrose 50% Injectable 25 Gram(s) IV Push once  dextrose 50% Injectable 25 Gram(s) IV Push once  heparin  Infusion 800 Unit(s)/Hr IV Continuous <Continuous>  influenza   Vaccine 0.5 milliLiter(s) IntraMuscular once  insulin glargine Injectable (LANTUS) 5 Unit(s) SubCutaneous at bedtime  insulin lispro (HumaLOG) corrective regimen sliding scale   SubCutaneous three times a day before meals  levothyroxine 125 MICROGram(s) Oral daily  lidocaine   Patch 1 Patch Transdermal daily  NIFEdipine XL 60 milliGRAM(s) Oral daily  nystatin Powder 1 Application(s) Topical three times a day  pantoprazole    Tablet 40 milliGRAM(s) Oral before breakfast  predniSONE   Tablet 20 milliGRAM(s) Oral daily  sodium bicarbonate 650 milliGRAM(s) Oral three times a day  vancomycin    Solution 125 milliGRAM(s) Oral every 6 hours    PRN MEDICATIONS  dextrose 40% Gel 15 Gram(s) Oral once PRN  glucagon  Injectable 1 milliGRAM(s) IntraMuscular once PRN  morphine  - Injectable 1 milliGRAM(s) IV Push two times a day PRN      Home Medications:  atorvastatin 10 mg oral tablet: 1 tab(s) orally once a day (10 Sep 2019 01:09)  diclofenac 1% topical gel:  (10 Sep 2019 01:10)  levothyroxine 125 mcg (0.125 mg) oral tablet: 1 tab(s) orally once a day (10 Sep 2019 01:09)  losartan 50 mg oral tablet: 1 tab(s) orally once a day (10 Sep 2019 01:09)  predniSONE 20 mg oral tablet: 1 tab(s) orally once a day (10 Sep 2019 01:09)      VITAL SIGNS: Last 24 Hours  T(C): 36.7 (29 Sep 2019 20:00), Max: 36.7 (29 Sep 2019 20:00)  T(F): 98 (29 Sep 2019 20:00), Max: 98 (29 Sep 2019 20:00)  HR: 60 (29 Sep 2019 20:00) (60 - 72)  BP: 121/78 (29 Sep 2019 20:00) (117/52 - 121/78)  BP(mean): --  RR: 18 (29 Sep 2019 20:00) (18 - 18)  SpO2: --    LABS:                        11.4   19.46 )-----------( 303      ( 30 Sep 2019 06:08 )             33.8     09-30    131<L>  |  98  |  76<HH>  ----------------------------<  83  6.0<HH>   |  18  |  3.1<H>    Ca    7.8<L>      30 Sep 2019 06:08  Mg     1.9     09-30    TPro  3.8<L>  /  Alb  1.9<L>  /  TBili  0.2  /  DBili  x   /  AST  31  /  ALT  41  /  AlkPhos  285<H>  09-30    PTT - ( 30 Sep 2019 06:08 )  PTT:77.5 sec              I&O's Summary    29 Sep 2019 07:01  -  30 Sep 2019 07:00  --------------------------------------------------------  IN: 2316 mL / OUT: 130 mL / NET: 2186 mL    30 Sep 2019 07:01  -  30 Sep 2019 11:19  --------------------------------------------------------  IN: 1060 mL / OUT: 0 mL / NET: 1060 mL          PHYSICAL EXAM:    General: Not in distress.   HEENT: Moist mucus membranes. PERRLA.  Cardio: Regular rate and rhythm, S1, S2, no murmurs  Pulm: Clear to auscultation bilaterally. No wheezing, or rhonchi  Abdomen: Soft, non-tender, mildly distended  Extremities: No cyanosis or edema bilaterally.   Neuro: A&O x3. No focal deficits.       RADIOLOGY:      < from: Xray Kidney Ureter Bladder (09.27.19 @ 09:26) >  Impression:  Non-obstructive bowel gas pattern.    < end of copied text >      < from: CT Abdomen and Pelvis No Cont (09.26.19 @ 00:20) >    IMPRESSION:   1.  No evidence of intra or retroperitoneal hematoma.  2.  Small bilateral pleural effusions with absent atelectasis.      < end of copied text >      < from: Xray Clavicle, Bilateral (09.12.19 @ 14:10) >  IMPRESSION:    Asymmetry of the sternoclavicular joints (right inferior to the left),   suboptimally evaluated due to technique; if there is clinical concern for   injury, dedicated CT is recommended for further evaluation.    Widened bilateral acromioclavicular joints, left more than right, most   likely due to prior surgery. Degenerative change in bilateral   glenohumeral joints.    < end of copied text >      < from: US Kidney and Bladder (09.11.19 @ 15:05) >  IMPRESSION:  Bilateral medical renal disease. No hydronephrosis.    < end of copied text >

## 2019-09-30 NOTE — PROGRESS NOTE ADULT - SUBJECTIVE AND OBJECTIVE BOX
no chest pain   no sob   no abdominal pain   still with diarrhea   Vital Signs Last 24 Hrs  T(C): 36.7 (29 Sep 2019 20:00), Max: 36.7 (29 Sep 2019 20:00)  T(F): 98 (29 Sep 2019 20:00), Max: 98 (29 Sep 2019 20:00)  HR: 60 (29 Sep 2019 20:00) (60 - 72)  BP: 121/78 (29 Sep 2019 20:00) (117/52 - 121/78)  BP(mean): --  RR: 18 (29 Sep 2019 20:00) (18 - 18)  SpO2: --    PHYSICAL EXAM:  GENERAL: NAD,  Pulm: Clear to auscultation bilaterally; No wheeze  CV: Regular rate and rhythm; No murmurs, rubs, or gallops  GI: Soft, Nontender, Nondistended; Bowel sounds present  EXTREMITIES:  B/L LE dressings   PSYCH: AAOx3  NEUROLOGY: non-focal                          11.4   19.46 )-----------( 303      ( 30 Sep 2019 06:08 )             33.8     09-30    131<L>  |  98  |  76<HH>  ----------------------------<  83  6.0<HH>   |  18  |  3.1<H>    Ca    7.8<L>      30 Sep 2019 06:08  Mg     1.9     09-30    TPro  3.8<L>  /  Alb  1.9<L>  /  TBili  0.2  /  DBili  x   /  AST  31  /  ALT  41  /  AlkPhos  285<H>  09-30    LIVER FUNCTIONS - ( 30 Sep 2019 06:08 )  Alb: 1.9 g/dL / Pro: 3.8 g/dL / ALK PHOS: 285 U/L / ALT: 41 U/L / AST: 31 U/L / GGT: x           PTT - ( 30 Sep 2019 06:08 )  PTT:77.5 sec      MEDICATIONS  (STANDING):  chlorhexidine 4% Liquid 1 Application(s) Topical <User Schedule>  collagenase Ointment 1 Application(s) Topical two times a day  dextrose 10%. 250 milliLiter(s) (1000 mL/Hr) IV Continuous <Continuous>  dextrose 5% + sodium chloride 0.45% 1000 milliLiter(s) (75 mL/Hr) IV Continuous <Continuous>  dextrose 5%. 1000 milliLiter(s) (50 mL/Hr) IV Continuous <Continuous>  dextrose 50% Injectable 12.5 Gram(s) IV Push once  dextrose 50% Injectable 25 Gram(s) IV Push once  dextrose 50% Injectable 25 Gram(s) IV Push once  heparin  Infusion 800 Unit(s)/Hr (8.5 mL/Hr) IV Continuous <Continuous>  influenza   Vaccine 0.5 milliLiter(s) IntraMuscular once  insulin glargine Injectable (LANTUS) 5 Unit(s) SubCutaneous at bedtime  insulin lispro (HumaLOG) corrective regimen sliding scale   SubCutaneous three times a day before meals  levothyroxine 125 MICROGram(s) Oral daily  lidocaine   Patch 1 Patch Transdermal daily  NIFEdipine XL 60 milliGRAM(s) Oral daily  nystatin Powder 1 Application(s) Topical three times a day  pantoprazole    Tablet 40 milliGRAM(s) Oral before breakfast  predniSONE   Tablet 20 milliGRAM(s) Oral daily  sodium bicarbonate 650 milliGRAM(s) Oral three times a day  vancomycin    Solution 125 milliGRAM(s) Oral every 6 hours    MEDICATIONS  (PRN):  dextrose 40% Gel 15 Gram(s) Oral once PRN Blood Glucose LESS THAN 70 milliGRAM(s)/deciliter  glucagon  Injectable 1 milliGRAM(s) IntraMuscular once PRN Glucose LESS THAN 70 milligrams/deciliter  morphine  - Injectable 1 milliGRAM(s) IV Push two times a day PRN Wound dressing change

## 2019-10-01 NOTE — CHART NOTE - NSCHARTNOTEFT_GEN_A_CORE
Called for SBP=77  Patient seen and examined. Repeat BP 70/40  Patient appears drowsy.   No chest pain and no SOB.     No watery stools overnight, but UOP is still low (total of 100cc overnight).    No signs of new active infection. Low BP could be due to dehydration,.     1L NSS flush given. Repeat BP=86/50.     CXR ordered  UA Ucx ordered  Lactic and blood cultures drawn in addition to CBC and BMP Called for SBP=77  Patient seen and examined. Repeat BP 70/40  Patient appears drowsy.   No chest pain and no SOB.     No watery stools overnight, but UOP is still low (total of 100cc overnight). Patient had several bouts of watery stool yesterday and she has not need eating or drinking    No signs of new active infection. Low BP could be due to dehydration,.     1L NSS flush given. Repeat BP=86/50.     CXR ordered  UA Ucx ordered  Lactic and blood cultures drawn in addition to CBC and BMP.     Consider pressors vs midodrine if BP does not improve.   Consider IV antibiotics for systemic infection if suspicion for sepsis is high.

## 2019-10-01 NOTE — PROGRESS NOTE ADULT - ASSESSMENT
83F PMHx DM2, ?CKD III, HLD, HTN, hypothyroidism, RA, depression here with generalized weakness found to have 2:1 av block, wenckebach s/p TVP. Cellulitis, LLE, s/p debridement. and had code blue today 10/1/19 asystole CPR with rounds of epi     plan :     #s/p code blue (asystole arrest ):   possibly secondary to PE given that she has femoral dvt   on hep gtt. was held during code will resume   femoral line in   s/p 6 rounds of epi   intubated   on levophid  chest xray no opacities   EP adjusted temporary pacemaker   get pan cultures   upgarde to ICU     #Sinus pause, 2:1 av block, wenckebach, s/p TVP  per EP needs PPM once stable       #BALDEV on CKD III with hyperkalemia 5.9 today   scr worse   renal follow up for possible HD  since  with c-diff would avoid any additional keyexalate.    #transaminitis improved but alk phos elevated   : has cholelithiasis and choledecholithiasis   per GI initially no intervention unless patient becomes symptomatic or septic   given events today would repeat US abd and recall GI if needed     #C-diff colitis: po vanco 125 q6h. needs NG tube     #Leukocytosis improved  patient on chronic prednisone 20 qday for RA. also has c-diff     #Cellulitis, LLE, pseudomonas  on wcx, s/p debridement  s/p cefepime ended 9/27 per ID. wound care as per burn         #massive right cuff tear:   seen by ortho here OPT follow up and WBAT in RUE     #UTI, emphysematous, ecoli  cefepime as above     #Right common femoral vein DVT  hep gtt target PTT 60-80     #acute on chronic anemia secondary to bleeding from LE wound resolved:   s/p 2 units of PRBC on 9/25/19  hgb has been stable   CT abd/pelvis no hematoma     #Hypothyroidism  synthroid       #DM2  monitor FS keep 140-180       #RA  prednisone 20 chronically     #HLD  on hold for elevated LFT;s which improved. keep on hold for now     #DVT ppx  hep gtt

## 2019-10-01 NOTE — PROGRESS NOTE ADULT - SUBJECTIVE AND OBJECTIVE BOX
INTERVAL HPI/OVERNIGHT EVENTS:  Called to bedside this AM, patient was Code BLUE for approximately 20 min around  8:52 AM. Patient had been hypotensive since 5 AM this morning, with SBP 70-80s. Currently intubated and CVL placed.     MEDICATIONS  (STANDING):  calcium gluconate IVPB 2 Gram(s) IV Intermittent once  chlorhexidine 4% Liquid 1 Application(s) Topical <User Schedule>  collagenase Ointment 1 Application(s) Topical two times a day  fentaNYL   Infusion. 0.5 MICROgram(s)/kG/Hr (3.2 mL/Hr) IV Continuous <Continuous>  heparin  Infusion 800 Unit(s)/Hr (8.5 mL/Hr) IV Continuous <Continuous>  influenza   Vaccine 0.5 milliLiter(s) IntraMuscular once  insulin glargine Injectable (LANTUS) 5 Unit(s) SubCutaneous at bedtime  insulin lispro (HumaLOG) corrective regimen sliding scale   SubCutaneous three times a day before meals  insulin regular  human recombinant. 10 Unit(s) IV Push once  levothyroxine 125 MICROGram(s) Oral daily  lidocaine   Patch 1 Patch Transdermal daily  midazolam Infusion 0.02 mG/kG/Hr (1.28 mL/Hr) IV Continuous <Continuous>  midodrine. 10 milliGRAM(s) Oral three times a day  norepinephrine Infusion 0.05 MICROgram(s)/kG/Min (3 mL/Hr) IV Continuous <Continuous>  nystatin Powder 1 Application(s) Topical three times a day  pantoprazole    Tablet 40 milliGRAM(s) Oral before breakfast  predniSONE   Tablet 20 milliGRAM(s) Oral daily  sodium bicarbonate 650 milliGRAM(s) Oral three times a day  sodium chloride 0.45% 1000 milliLiter(s) (75 mL/Hr) IV Continuous <Continuous>  sodium chloride 0.9%. 1000 milliLiter(s) (1000 mL/Hr) IV Continuous <Continuous>  vancomycin    Solution 125 milliGRAM(s) Oral every 6 hours    MEDICATIONS  (PRN):  dextrose 40% Gel 15 Gram(s) Oral once PRN Blood Glucose LESS THAN 70 milliGRAM(s)/deciliter  glucagon  Injectable 1 milliGRAM(s) IntraMuscular once PRN Glucose LESS THAN 70 milligrams/deciliter  morphine  - Injectable 2 milliGRAM(s) IV Push every 6 hours PRN Severe Pain (7 - 10)      Allergies  No Known Allergies    REVIEW OF SYSTEMS  Due to altered mental status/intubation, subjective information were not able to be obtained from the patient. History was obtained, to the extent possible, from review of the chart and collateral sources of information.      Vital Signs Last 24 Hrs  T(C): 37.1 (01 Oct 2019 08:21), Max: 37.1 (01 Oct 2019 05:10)  T(F): 98.7 (01 Oct 2019 08:21), Max: 98.7 (01 Oct 2019 05:10)  HR: 65 (01 Oct 2019 08:21) (60 - 78)  BP: 77/47 (01 Oct 2019 08:21) (77/44 - 121/56)  BP(mean): --  RR: 19 (01 Oct 2019 08:21) (18 - 20)  SpO2: 95% (01 Oct 2019 08:21) (95% - 97%)    09-30-19 @ 07:01  -  10-01-19 @ 07:00  --------------------------------------------------------  IN: 4020 mL / OUT: 505 mL / NET: 3515 mL      Physical Exam    GENERAL:   HEART:   PULMONARY:   ABDOMEN:   EXTREMITIES:    NEUROLOGICAL:     LABS:                        11.8   15.63 )-----------( 245      ( 01 Oct 2019 06:14 )             35.5     10-01    129<L>  |  95<L>  |  78<HH>  ----------------------------<  195<H>  5.9<H>   |  15<L>  |  3.1<H>    Ca    7.5<L>      01 Oct 2019 06:14  Mg     1.8     10-01    TPro  3.5<L>  /  Alb  1.5<L>  /  TBili  0.3  /  DBili  x   /  AST  33  /  ALT  42<H>  /  AlkPhos  314<H>  10-01    PTT - ( 01 Oct 2019 06:14 )  PTT:>200.0 sec      RADIOLOGY & ADDITIONAL TESTS:  < from: 12 Lead ECG (09.29.19 @ 10:28) >  Ventricular Rate 60 BPM    Atrial Rate 60 BPM    QRS Duration 160 ms    Q-T Interval 418 ms    QTC Calculation(Bezet) 418 ms    R Axis -41 degrees    T Axis 128 degrees    Diagnosis Line Ventricular-paced rhythm  Abnormal ECG    < end of copied text > INTERVAL HPI/OVERNIGHT EVENTS:  Called to bedside this AM, patient was Code BLUE for approximately 20 min around  8:52 AM. Patient had been hypotensive since 5 AM this morning, with SBP 70-80s. Currently intubated and CVL placed.     MEDICATIONS  (STANDING):  calcium gluconate IVPB 2 Gram(s) IV Intermittent once  chlorhexidine 4% Liquid 1 Application(s) Topical <User Schedule>  collagenase Ointment 1 Application(s) Topical two times a day  fentaNYL   Infusion. 0.5 MICROgram(s)/kG/Hr (3.2 mL/Hr) IV Continuous <Continuous>  heparin  Infusion 800 Unit(s)/Hr (8.5 mL/Hr) IV Continuous <Continuous>  influenza   Vaccine 0.5 milliLiter(s) IntraMuscular once  insulin glargine Injectable (LANTUS) 5 Unit(s) SubCutaneous at bedtime  insulin lispro (HumaLOG) corrective regimen sliding scale   SubCutaneous three times a day before meals  insulin regular  human recombinant. 10 Unit(s) IV Push once  levothyroxine 125 MICROGram(s) Oral daily  lidocaine   Patch 1 Patch Transdermal daily  midazolam Infusion 0.02 mG/kG/Hr (1.28 mL/Hr) IV Continuous <Continuous>  midodrine. 10 milliGRAM(s) Oral three times a day  norepinephrine Infusion 0.05 MICROgram(s)/kG/Min (3 mL/Hr) IV Continuous <Continuous>  nystatin Powder 1 Application(s) Topical three times a day  pantoprazole    Tablet 40 milliGRAM(s) Oral before breakfast  predniSONE   Tablet 20 milliGRAM(s) Oral daily  sodium bicarbonate 650 milliGRAM(s) Oral three times a day  sodium chloride 0.45% 1000 milliLiter(s) (75 mL/Hr) IV Continuous <Continuous>  sodium chloride 0.9%. 1000 milliLiter(s) (1000 mL/Hr) IV Continuous <Continuous>  vancomycin    Solution 125 milliGRAM(s) Oral every 6 hours    MEDICATIONS  (PRN):  dextrose 40% Gel 15 Gram(s) Oral once PRN Blood Glucose LESS THAN 70 milliGRAM(s)/deciliter  glucagon  Injectable 1 milliGRAM(s) IntraMuscular once PRN Glucose LESS THAN 70 milligrams/deciliter  morphine  - Injectable 2 milliGRAM(s) IV Push every 6 hours PRN Severe Pain (7 - 10)      Allergies  No Known Allergies    REVIEW OF SYSTEMS  Due to altered mental status/intubation, subjective information were not able to be obtained from the patient. History was obtained, to the extent possible, from review of the chart and collateral sources of information.      Vital Signs Last 24 Hrs  T(C): 37.1 (01 Oct 2019 08:21), Max: 37.1 (01 Oct 2019 05:10)  T(F): 98.7 (01 Oct 2019 08:21), Max: 98.7 (01 Oct 2019 05:10)  HR: 65 (01 Oct 2019 08:21) (60 - 78)  BP: 77/47 (01 Oct 2019 08:21) (77/44 - 121/56)  BP(mean): --  RR: 19 (01 Oct 2019 08:21) (18 - 20)  SpO2: 95% (01 Oct 2019 08:21) (95% - 97%)    09-30-19 @ 07:01  -  10-01-19 @ 07:00  --------------------------------------------------------  IN: 4020 mL / OUT: 505 mL / NET: 3515 mL      Physical Exam    GENERAL: intubated  HEART: S1S2 RRR  PULMONARY: Intubated. Diminished   ABDOMEN: soft, NT,ND  EXTREMITIES: + B/L LE edema, kerlex dressing bilaterally  NEUROLOGICAL: unresponsive    LABS:                        11.8   15.63 )-----------( 245      ( 01 Oct 2019 06:14 )             35.5     10-01    129<L>  |  95<L>  |  78<HH>  ----------------------------<  195<H>  5.9<H>   |  15<L>  |  3.1<H>    Ca    7.5<L>      01 Oct 2019 06:14  Mg     1.8     10-01    TPro  3.5<L>  /  Alb  1.5<L>  /  TBili  0.3  /  DBili  x   /  AST  33  /  ALT  42<H>  /  AlkPhos  314<H>  10-01    PTT - ( 01 Oct 2019 06:14 )  PTT:>200.0 sec      RADIOLOGY & ADDITIONAL TESTS:  < from: 12 Lead ECG (09.29.19 @ 10:28) >  Ventricular Rate 60 BPM    Atrial Rate 60 BPM    QRS Duration 160 ms    Q-T Interval 418 ms    QTC Calculation(Bezet) 418 ms    R Axis -41 degrees    T Axis 128 degrees    Diagnosis Line Ventricular-paced rhythm  Abnormal ECG    < end of copied text >

## 2019-10-01 NOTE — PROCEDURE NOTE - NSPROCDETAILS_GEN_ALL_CORE
guidewire recovered/lumen(s) aspirated and flushed
sutured in place/hemostasis with direct pressure, dressing applied/ultrasound guidance/positive blood return obtained via catheter/location identified, draped/prepped, sterile technique used, needle inserted/introduced/connected to a pressurized flush line/all materials/supplies accounted for at end of procedure

## 2019-10-01 NOTE — PROGRESS NOTE ADULT - ASSESSMENT
83y Female with past medical hx of HTN, ckd ( ? BASELINE), TYPE 2 dm, dld admitted initially because of weakness and increased urinary frequency diagnosed as emphymatous cystitis.  sp code blue intubated on MV     # BALDEV on ckd ( baseline ?)  / had diarrhea / positive for C diff / sp code blue x18 minutes / hyponatremia/ hyperkalemia   # Hyperkalemia noted with oliguria   # intubated on MV   # lasix 80 iV x1 now   # IP level noted ok   # h/h noted, check iron stores, / Streeter and lambda noted within target for BALDEV   # will follow closely / may need RRT soon   # follow repeat BMP     will follow closely

## 2019-10-01 NOTE — PROGRESS NOTE ADULT - SUBJECTIVE AND OBJECTIVE BOX
patient was unresponsive this morning and code blue was called had asystole   patient had CPR 6 rounds of epi , got 2 bicarb   EP also adjusted pacemaker     Vital Signs Last 24 Hrs  T(C): 37.1 (01 Oct 2019 08:21), Max: 37.1 (01 Oct 2019 05:10)  T(F): 98.7 (01 Oct 2019 08:21), Max: 98.7 (01 Oct 2019 05:10)  HR: 65 (01 Oct 2019 08:21) (60 - 78)  BP: 77/47 (01 Oct 2019 08:21) (77/44 - 121/56)  BP(mean): --  RR: 19 (01 Oct 2019 08:21) (18 - 20)  SpO2: 95% (01 Oct 2019 08:21) (95% - 97%)    PHYSICAL EXAM:  GENERAL:intubated   CHEST/LUNG: B/L air entry   HEART: Regular rate and rhythm;   ABDOMEN: Soft,+BS  EXTREMITIES:  B/l dreesings   NEUROLOGY: sedated                             11.8   15.63 )-----------( 245      ( 01 Oct 2019 06:14 )             35.5     10-01    129<L>  |  95<L>  |  78<HH>  ----------------------------<  195<H>  5.9<H>   |  15<L>  |  3.1<H>    Ca    7.5<L>      01 Oct 2019 06:14  Mg     1.8     10-01    TPro  3.5<L>  /  Alb  1.5<L>  /  TBili  0.3  /  DBili  x   /  AST  33  /  ALT  42<H>  /  AlkPhos  314<H>  10-01    LIVER FUNCTIONS - ( 01 Oct 2019 06:14 )  Alb: 1.5 g/dL / Pro: 3.5 g/dL / ALK PHOS: 314 U/L / ALT: 42 U/L / AST: 33 U/L / GGT: x           PTT - ( 01 Oct 2019 06:14 )  PTT:>200.0 sec  CARDIAC MARKERS ( 30 Sep 2019 18:04 )  x     / x     / 32 U/L / x     / x          MEDICATIONS  (STANDING):  calcium gluconate IVPB 2 Gram(s) IV Intermittent once  chlorhexidine 4% Liquid 1 Application(s) Topical <User Schedule>  collagenase Ointment 1 Application(s) Topical two times a day  dextrose 10%. 250 milliLiter(s) (1000 mL/Hr) IV Continuous <Continuous>  dextrose 5%. 1000 milliLiter(s) (50 mL/Hr) IV Continuous <Continuous>  dextrose 50% Injectable 12.5 Gram(s) IV Push once  dextrose 50% Injectable 25 Gram(s) IV Push once  dextrose 50% Injectable 25 Gram(s) IV Push once  dextrose 50% Injectable 50 milliLiter(s) IV Push once  fentaNYL   Infusion. 0.5 MICROgram(s)/kG/Hr (3.2 mL/Hr) IV Continuous <Continuous>  heparin  Infusion 800 Unit(s)/Hr (8.5 mL/Hr) IV Continuous <Continuous>  influenza   Vaccine 0.5 milliLiter(s) IntraMuscular once  insulin glargine Injectable (LANTUS) 5 Unit(s) SubCutaneous at bedtime  insulin lispro (HumaLOG) corrective regimen sliding scale   SubCutaneous three times a day before meals  insulin regular  human recombinant. 10 Unit(s) IV Push once  levothyroxine 125 MICROGram(s) Oral daily  lidocaine   Patch 1 Patch Transdermal daily  midazolam Infusion 0.02 mG/kG/Hr (1.28 mL/Hr) IV Continuous <Continuous>  midodrine. 10 milliGRAM(s) Oral three times a day  norepinephrine Infusion 0.05 MICROgram(s)/kG/Min (3 mL/Hr) IV Continuous <Continuous>  nystatin Powder 1 Application(s) Topical three times a day  pantoprazole    Tablet 40 milliGRAM(s) Oral before breakfast  predniSONE   Tablet 20 milliGRAM(s) Oral daily  sodium bicarbonate 650 milliGRAM(s) Oral three times a day  sodium chloride 0.45% 1000 milliLiter(s) (75 mL/Hr) IV Continuous <Continuous>  sodium chloride 0.9%. 1000 milliLiter(s) (1000 mL/Hr) IV Continuous <Continuous>  vancomycin    Solution 125 milliGRAM(s) Oral every 6 hours    MEDICATIONS  (PRN):  dextrose 40% Gel 15 Gram(s) Oral once PRN Blood Glucose LESS THAN 70 milliGRAM(s)/deciliter  glucagon  Injectable 1 milliGRAM(s) IntraMuscular once PRN Glucose LESS THAN 70 milligrams/deciliter  morphine  - Injectable 2 milliGRAM(s) IV Push every 6 hours PRN Severe Pain (7 - 10)

## 2019-10-01 NOTE — PROGRESS NOTE ADULT - NSHPATTENDINGPLANDISCUSS_GEN_ALL_CORE
patient and medical team
med team
patient
medical team
MED TEAM
patient and fellow
medicine

## 2019-10-01 NOTE — PROGRESS NOTE ADULT - ASSESSMENT
Impression:     Cardiopulmonary arrest likely d/t hypoxic event  Wenckebach block and periods of 2:1 block  Bradycardic to 30s when asleep s/p Temporary/permanent pacemaker placed on 9/17 ([employer])  + C. Diff   LE cellulitis s/p multiple debridements by Burn    Plan:    - Correct hyperkalemia  - LE venous duplex  - CXR ? aspiration PNA  - Resume heparin gtt when stable  - Requires PPM, date TBD, new C.diff - needs ID clearance Impression:   Wenckebach block and periods of 2:1 block pacing at 69/min  Bradycardic to 30s when asleep s/p Temporary/permanent pacemaker placed on 9/17 ([employer])  PACED At 60/min  Cardiopulmonary arrest Asystolic  type most  likely d/t hypoxic event such as  PE  + C. Diff   LE cellulitis s/p multiple debridements by Burn    Plan:  intubated   - Correct hyperkalemia with Ca, bicarbonate  orogramed pacing rate to100/ min to improve BP  - LE venous duplex SHOWED DEEP FEMORAL VEIN EXTENDED THROMUS  - CXR ? aspiration PNA  - Resume heparin gtt when stable  Prognosis very poor  - WILL FOLLOW

## 2019-10-01 NOTE — PROGRESS NOTE ADULT - SUBJECTIVE AND OBJECTIVE BOX
Nephrology progress note  Patient is seen and examined, events over the last 24 h noted .  sp code blue this morning  was hypotensive overnight     Allergies:  No Known Allergies    Hospital Medications:   MEDICATIONS  (STANDING):  calcium gluconate IVPB 2 Gram(s) IV Intermittent once  collagenase Ointment 1 Application(s) Topical two times a day  dextrose 10%. 250 milliLiter(s) (1000 mL/Hr) IV Continuous <Continuous>  dextrose 5%. 1000 milliLiter(s) (50 mL/Hr) IV Continuous <Continuous>  fentaNYL   Infusion. 0.5 MICROgram(s)/kG/Hr (3.2 mL/Hr) IV Continuous <Continuous>  heparin  Infusion 800 Unit(s)/Hr (8.5 mL/Hr) IV Continuous <Continuous>  insulin glargine Injectable (LANTUS) 5 Unit(s) SubCutaneous at bedtime  insulin lispro (HumaLOG) corrective regimen sliding scale   SubCutaneous three times a day before meals  levothyroxine 125 MICROGram(s) Oral daily  lidocaine   Patch 1 Patch Transdermal daily  midazolam Infusion 0.02 mG/kG/Hr (1.28 mL/Hr) IV Continuous <Continuous>  midodrine. 10 milliGRAM(s) Oral three times a day  norepinephrine Infusion 0.05 MICROgram(s)/kG/Min (3 mL/Hr) IV Continuous <Continuous>  nystatin Powder 1 Application(s) Topical three times a day  pantoprazole    Tablet 40 milliGRAM(s) Oral before breakfast  predniSONE   Tablet 20 milliGRAM(s) Oral daily  sodium bicarbonate 650 milliGRAM(s) Oral three times a day  sodium chloride 0.45% 1000 milliLiter(s) (75 mL/Hr) IV Continuous <Continuous>  vancomycin    Solution 125 milliGRAM(s) Oral every 6 hours        VITALS:  T(F): 98.7 (10-01-19 @ 08:21), Max: 98.7 (10-01-19 @ 05:10)  HR: 65 (10-01-19 @ 08:21)  BP: 77/47 (10-01-19 @ 08:21)  RR: 19 (10-01-19 @ 08:21)  SpO2: 95% (10-01-19 @ 08:21)      09-29 @ 07:01  -  09-30 @ 07:00  --------------------------------------------------------  IN: 2316 mL / OUT: 130 mL / NET: 2186 mL    09-30 @ 07:01  -  10-01 @ 07:00  --------------------------------------------------------  IN: 4020 mL / OUT: 505 mL / NET: 3515 mL          PHYSICAL EXAM:  Constitutional: intubated on MV   HEENT: anicteric sclera, oropharynx clear, MMM  Neck: No JVD positive ETT   Respiratory: CTAB, no wheezes, rales or rhonchi  Cardiovascular: S1, S2, RRR  Gastrointestinal: BS+, soft, NT/ND  Extremities: No cyanosis or clubbing. No peripheral edema  :  No watson.   Skin: No rashes    LABS:  10-01    129<L>  |  95<L>  |  78<HH>  ----------------------------<  195<H>  5.9<H>   |  15<L>  |  3.1<H>    Ca    7.5<L>      01 Oct 2019 06:14  Mg     1.8     10-01    TPro  3.5<L>  /  Alb  1.5<L>  /  TBili  0.3  /  DBili      /  AST  33  /  ALT  42<H>  /  AlkPhos  314<H>  10-01                          11.8   15.63 )-----------( 245      ( 01 Oct 2019 06:14 )             35.5       Urine Studies:      RADIOLOGY & ADDITIONAL STUDIES:

## 2019-10-01 NOTE — CHART NOTE - NSCHARTNOTEFT_GEN_A_CORE
83 year old female with PMH of DM II, anemia, CKD, DLD, RA, depression was brought in by EMS from home for weakness, decreased ambulation, frequent falls and frequent urination and admitted for emphysematous cystits. Hospital course was complicated by cellulitis s/p multiple debriedements and Cefepime course. Pt was found to have 2:1 AVB with sinus pauses on EKG on presentation, and had a transvenous pacemaker placed with plan for an eventual PPM placement. However, placement of PPM had to be delayed as pt developed C.Diff Colitis on 09/26.  Hospital course was also complicated by a R common femoral DVT for which she was started on heparin drip    This AM around 8.50 AM, I went to assess the pt and found her unresponsive with dark brown secretions coming out of her mouth, and no pulse. Off note, pt has been hypotensive with SBP in the 80's overnight, but was asympotoamtic, mentating well, and without tachycardia (off AV-nabor blockers), and was only given 1L IVF bolus.    Code blue was called, and ROSC was obtained approximately 20 minutes later after 6 rounds of epinephrine.  Pt was started on levophed drip for pressor support with target MAP>60mmHG, and fentanyl and versed drips for pain and sedation.    Differential Dx includes PE given hypoxia a 83 year old female with PMH of DM II, anemia, CKD, DLD, RA, depression was brought in by EMS from home for weakness, decreased ambulation, frequent falls and frequent urination and admitted for emphysematous cystits. Hospital course was complicated by cellulitis s/p multiple debriedements and Cefepime course. Pt was found to have 2:1 AVB with sinus pauses on EKG on presentation, and had a transvenous pacemaker placed with plan for an eventual PPM placement. However, placement of PPM had to be delayed as pt developed C.Diff Colitis on 09/26.  Hospital course was also complicated by a R common femoral DVT for which she was started on heparin drip    This AM around 8.50 AM, I went to assess the pt and found her unresponsive with dark brown secretions coming out of her mouth, and no pulse. Off note, pt has been hypotensive with SBP in the 80's overnight, but was asymptomatic, mentating well, and without tachycardia (off AV-nabor blockers), and was only given 1L IVF bolus.    Code blue was called, and ROSC was obtained approximately 20 minutes later after 6 rounds of epinephrine.  Pt was started on levophed drip for pressor support with target MAP>60mmHG, and fentanyl and versed drips for pain and sedation.    Assessment  Differential Dx includes PE vs hyperkalemia (less likely) vs hypovolemia  - C/w vasopressor support  - Consider CTA vs V/Q scan to ruled out PE given common femoral DVT (although pt has been therapeutically anticoagulated on Heparin)  - s/p 2g Ca gluconate and insulin/dextrose for hyperkalemia. F/u repeat  - F/u stat labs sent during code    #Sign out given to ICU fellow and senior resident

## 2019-10-01 NOTE — DISCHARGE NOTE FOR THE EXPIRED PATIENT - HOSPITAL COURSE
83 year old female with PMH of DM II, anemia, CKD, DLD, RA, depression was brought in by EMS from home for weakness, decreased ambulation, frequent falls and frequent urination and admitted for emphysematous cystits. Hospital course was complicated by cellulitis s/p multiple debriedements and Cefepime course. Pt was found to have 2:1 AVB with sinus pauses on EKG on presentation, and had a transvenous pacemaker placed with plan for an eventual PPM placement. However, placement of PPM had to be delayed as pt developed C.Diff Colitis on 09/26.  Hospital course was also complicated by a R common femoral DVT for which she was started on heparin drip    This AM around 8.50 AM, I went to assess the pt and found her unresponsive with dark brown secretions coming out of her mouth, and no pulse. Off note, pt has been hypotensive with SBP in the 80's overnight, but was asympotoamtic, mentating well, and without tachycardia (off AV-nabor blockers), and was only given 1L IVF bolus.    Code blue was called, and ROSC was obtained approximately 20 minutes later after 6 rounds of epinephrine.  Pt was started on levophed drip for pressor support with target MAP>60mmHG, and fentanyl and versed drips for pain and sedation.    Differential Dx includes PE given hypoxia a.      Code blue was called again in ICU and no ROSC was achieved. Patient was pronounced after cardiopulmonary arrest at 1:32 pm on 10/1/2019

## 2019-10-01 NOTE — PROGRESS NOTE ADULT - REASON FOR ADMISSION
Emphysematous cystitis
Emphysematous cystitis  AV block  LE cellulitis
Emphysematous cystitis  DVT  Heart Block
Emphysematous cystitis  LE cellulitis  Bradycardia
Emphysematous cystitis

## 2019-10-02 LAB — GLUCOSE BLDC GLUCOMTR-MCNC: 230 MG/DL — HIGH (ref 70–99)

## 2019-10-06 LAB
CULTURE RESULTS: SIGNIFICANT CHANGE UP
SPECIMEN SOURCE: SIGNIFICANT CHANGE UP

## 2019-10-14 DIAGNOSIS — E87.2 ACIDOSIS: ICD-10-CM

## 2019-10-14 DIAGNOSIS — K80.50 CALCULUS OF BILE DUCT WITHOUT CHOLANGITIS OR CHOLECYSTITIS WITHOUT OBSTRUCTION: ICD-10-CM

## 2019-10-14 DIAGNOSIS — E11.22 TYPE 2 DIABETES MELLITUS WITH DIABETIC CHRONIC KIDNEY DISEASE: ICD-10-CM

## 2019-10-14 DIAGNOSIS — N30.81 OTHER CYSTITIS WITH HEMATURIA: ICD-10-CM

## 2019-10-14 DIAGNOSIS — J96.90 RESPIRATORY FAILURE, UNSPECIFIED, UNSPECIFIED WHETHER WITH HYPOXIA OR HYPERCAPNIA: ICD-10-CM

## 2019-10-14 DIAGNOSIS — R74.0 NONSPECIFIC ELEVATION OF LEVELS OF TRANSAMINASE AND LACTIC ACID DEHYDROGENASE [LDH]: ICD-10-CM

## 2019-10-14 DIAGNOSIS — A41.9 SEPSIS, UNSPECIFIED ORGANISM: ICD-10-CM

## 2019-10-14 DIAGNOSIS — E78.5 HYPERLIPIDEMIA, UNSPECIFIED: ICD-10-CM

## 2019-10-14 DIAGNOSIS — I82.411 ACUTE EMBOLISM AND THROMBOSIS OF RIGHT FEMORAL VEIN: ICD-10-CM

## 2019-10-14 DIAGNOSIS — L03.116 CELLULITIS OF LEFT LOWER LIMB: ICD-10-CM

## 2019-10-14 DIAGNOSIS — E87.1 HYPO-OSMOLALITY AND HYPONATREMIA: ICD-10-CM

## 2019-10-14 DIAGNOSIS — R00.1 BRADYCARDIA, UNSPECIFIED: ICD-10-CM

## 2019-10-14 DIAGNOSIS — B96.20 UNSPECIFIED ESCHERICHIA COLI [E. COLI] AS THE CAUSE OF DISEASES CLASSIFIED ELSEWHERE: ICD-10-CM

## 2019-10-14 DIAGNOSIS — I44.2 ATRIOVENTRICULAR BLOCK, COMPLETE: ICD-10-CM

## 2019-10-14 DIAGNOSIS — L97.929 NON-PRESSURE CHRONIC ULCER OF UNSPECIFIED PART OF LEFT LOWER LEG WITH UNSPECIFIED SEVERITY: ICD-10-CM

## 2019-10-14 DIAGNOSIS — E44.0 MODERATE PROTEIN-CALORIE MALNUTRITION: ICD-10-CM

## 2019-10-14 DIAGNOSIS — F32.9 MAJOR DEPRESSIVE DISORDER, SINGLE EPISODE, UNSPECIFIED: ICD-10-CM

## 2019-10-14 DIAGNOSIS — L97.919 NON-PRESSURE CHRONIC ULCER OF UNSPECIFIED PART OF RIGHT LOWER LEG WITH UNSPECIFIED SEVERITY: ICD-10-CM

## 2019-10-14 DIAGNOSIS — R29.6 REPEATED FALLS: ICD-10-CM

## 2019-10-14 DIAGNOSIS — L03.115 CELLULITIS OF RIGHT LOWER LIMB: ICD-10-CM

## 2019-10-14 DIAGNOSIS — N18.3 CHRONIC KIDNEY DISEASE, STAGE 3 (MODERATE): ICD-10-CM

## 2019-10-14 DIAGNOSIS — N17.9 ACUTE KIDNEY FAILURE, UNSPECIFIED: ICD-10-CM

## 2019-10-14 DIAGNOSIS — A04.72 ENTEROCOLITIS DUE TO CLOSTRIDIUM DIFFICILE, NOT SPECIFIED AS RECURRENT: ICD-10-CM

## 2019-10-14 DIAGNOSIS — M06.9 RHEUMATOID ARTHRITIS, UNSPECIFIED: ICD-10-CM

## 2019-10-14 DIAGNOSIS — I07.1 RHEUMATIC TRICUSPID INSUFFICIENCY: ICD-10-CM

## 2019-10-14 DIAGNOSIS — I10 ESSENTIAL (PRIMARY) HYPERTENSION: ICD-10-CM

## 2019-10-14 DIAGNOSIS — E87.5 HYPERKALEMIA: ICD-10-CM

## 2019-10-14 DIAGNOSIS — M79.89 OTHER SPECIFIED SOFT TISSUE DISORDERS: ICD-10-CM

## 2019-10-14 DIAGNOSIS — M19.011 PRIMARY OSTEOARTHRITIS, RIGHT SHOULDER: ICD-10-CM

## 2019-10-14 DIAGNOSIS — D62 ACUTE POSTHEMORRHAGIC ANEMIA: ICD-10-CM

## 2019-10-16 PROBLEM — Z00.00 ENCOUNTER FOR PREVENTIVE HEALTH EXAMINATION: Status: ACTIVE | Noted: 2019-10-16

## 2019-11-07 NOTE — H&P ADULT - NSHPSOCIALHISTORY_GEN_ALL_CORE
Mid-Level Procedure Text (B): After obtaining clear surgical margins the patient was sent to a mid-level provider for surgical repair.  The patient understands they will receive post-surgical care and follow-up from the mid-level provider. Denies smoking, alcohol or drug use

## 2020-03-29 NOTE — ED ADULT NURSE NOTE - CAS EDN DISCHARGE ASSESSMENT
Wound team note:   Pt seen for re-placement of BMS, has been 29 days.  MD order verified. Pt assessed, noted to be incontinent of loose brown stool. Sacrum/buttocks pink and intact. Sphincter tone determined to be adequate. BMS placed without difficulty, 42 mL of tap water placed in retention balloon, irrigated with 60 mL warm tap water. Confirmed irrigant/stool output in tube.  Nursing to irrigate q shift with 60 mL-120 mL warm tap water or until patent.    Patient baseline mental status/Alert and oriented to person, place and time

## 2020-08-20 NOTE — PRE-OP CHECKLIST - NOTHING BY MOUTH SINCE
18-Sep-2019 00:00 Debridement Text: The wound edges were debrided prior to proceeding with the closure to facilitate wound healing.

## 2022-06-16 NOTE — PROGRESS NOTE ADULT - ASSESSMENT
Try to work on increasing activity - ideal 30 minutes,  5 days a week.    2. Can cut out the craisins or raisins on the salad since you get fresh fruit    3. Reduce protein shake to 8oz (1/2 of usual amount)    4. Other meal prep delivery: Purple Carrot, Blue Apron, Fresh and Easy, Green , Home   Hot meal options: Meals on Wheels OR Mom's Meals  Frozen meals: Lean Cuisine OR Healthy Choice OR Smart Ones and pair with a fruit    5. Ok to eat a plate of fruit for dinner and cottage cheese: 2 cups melon, 1/2-1 cup berries (2.5-3 cups fruit) and 1/2 cottage cheese. If still hungry add a few baby carrots or tomatoes.    6. Unfortunately none of the over the counter medications have been proven for weight loss such as GoLo.  They are ok to try but check with the pharmacist before taking to make sure there is not anything in there that will interact with your current medications.  If you do not see any weight loss after 1 month (or 1 container), can stop taking it.  However, there are medically approved weight loss medications so if you are still struggling with weight loss after making above changes (1/2-1 lbs a week), ask your provider about possibly trying a weight loss medication or referring you to a provider or program that manages weight loss medications and oversees weight loss.    7. [employer] support number: [phone]    FOLLOW UP APPOINTMENT: Will call you again around 1:30pm on Thursday, September 22nd around 1:30pm.    Diane Schwartz RDN, LD, [facility]   [phone]     83 year old female with PMH of DM II, CKD, DLD, RA, depression brought in by EMS from home for weakness, decreased ambulation, frequent falls and frequent urination. Admitted with multiple comorbidities.  Ulcers on bl lower legs, currently s/p debridement, however bleeding from right leg.  Found to have symptomatic bradycardia, currently s/p external/temporary pacemaker.  Cleared by ID and burn for surgery for permanent pacemaker.  However, EP considering the possibility that a permanent pacemaker isn't necessary, will f/u.  Hgb 6.3 9/25/19 received 2 units prbc now 10.4 likely from RLL bleeding, burn notified.    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 ID follow-up and clearance    # Bilateral shins ulceration with surrounding cellulitis  - s/p debridement 9/18  - Wound cultures: pseudomonas and candida (d/w ID no changes in antimicrobials needed)  - Wound care as per burn // recalled because of bleeding in right lower leg burn team d/w attending about thrombin dressing, bleeding currently controlled restarted heparin drip  - continue with cefepime (ends 9/28)    # C. diff colitis  - Loose BM, hyperactive BS  - Start PO Vanco  - KUB with no evidence of karlo-colon  - ID follow up  - Cefepime ends 9/28, will hold last dose    # UTI, Positive UA with hematuria and increased urinary frequency  - on cefepime now    # Right common femoral DVT   - Vascular consult appreciated - heparin in hospital then Eliquis on discharge; f/u w/in 2 mos w/ vascular  - Heparin for AC until permanent PPM is placed    # BALDEV on CKD 3  - Creatinine stable  - NAGMA - continue Bicarb PO    # Hyperkalemia - improved  - s/p Kayexalate  - Monitor    # Common bile duct choledocholith  - no surgical intervention  - US abdomen: +ve cholelithiasis, no cholecystitis.   - Given no evidence of cholangitis or obstructive sx, would defer intervention in active infection and cardiac comorbidity - ERCP to be considered vs IR biliary drainage should the prior conditions develop    # Acute on chronic blood loss anemia from left lower extremity wound - resolved  - s/p 2 pRBC  - CT A/P with no evidence of hematoma  - Restarted Heparin (for DVT)    # Rt shoulder pain  - X ray acromioclavicular joint separation of indeterminate age with widening of the AC joint interval measuring 1 cm.   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  - Per ortho: Sling and outpt follow up in 4 weeks, WBAT in RUE    # Weakness and frequent falls  - Likely due to multiple comorbidities  - Trauma workup in ED negative for any fractures  - follow up with PT/rehab after procedure    # DM II  - due to episodes of hypoglycemia, dc'd basal insulin and only on sliding scale now  - Carb consistent diet    # Rheumatoid arthritis  - On Prednisone 20mg qd  - stable    # Hypothyroidism - continue L-thyroxine    # DLD - hold statin for transaminitis    # Elder self-neglect  - poor nutritional status   - Unsafe living condition  - Needs placement    GI ppx: Protonix    DVT ppx/tx: heparin drip    Dispo: from home, needs placement    PLAN: f/U with EP pacemaker likely unnecessary,  f/u 16:00 and 23:30 aptt, AM ordered

## 2022-07-20 NOTE — PROGRESS NOTE ADULT - ASSESSMENT
83F PMHx DM2, ?CKD III, HLD, HTN, hypothyroidism, RA, depression here with generalized weakness found to have 2:1 av block, wenckebach s/p TVP. Cellulitis, LLE, s/p debridement.      #Sinus pauses w/ 2:1 av block s/p TVP  -Pt needs PPM placement, but requires clearance by ID given active C.DIff  - Unlikely to tolerate OR untill her diarrhea resolves    #BALDEV on CKD III w/ hyperkalemia  - Worsening today, CR 3.1 <-- 2.36  _ Pt oliguric + Morris  - Renal follow up appreciated. Start 1/2 NS w/ 75mEQ of bicarb  - K+ 6.0 this AM. Pt given insulin/dextrose. EKG negative for new changes.  - AVOID KAYEXALATE AS PT IS HAVING SEVERE DIARRHEA DUE TO HER C.DIFF  - Repeat BMP ordered for 11 AM and 4 PM.  - F/u CK level (4PM)    #C-diff colitis  - C/w PO vanco 125 q6h.  -  F/u KUB given worsening leukocytosis     #Leukocytosis secondary to C.Diff colitis vs chronic prednisone 20mg QD      #Cellulitis, LLE, pseudomonas  on wcx, s/p debridement  s/p cefepime ended 9/27 per ID. wound care as per burn     #Transaminitis : has cholelithiasis and choledecholithiasis   - Asymptomatic   - GI no intervention unless there is evidence of sepsis or cholangitis     #Massive right cuff tear:   -* seen by ortho here OPT follow up and WBAT in RUE     #Right common femoral vein DVT  - C/w heparin drip; target PTT 60-80   - will start eliquis after PPM     #acute on chronic anemia secondary to bleeding from LE wound resolved:   - s/p 2 units of PRBC on 9/25/19  hgb has been stable   - CT abd/pelvis no hematoma     #Hypothyroidism  - C/w  synthroid     #HTN- controlled   - C/w  procardia 60 mg QD    #DM2  on lantus 5 units qhs   FS target 140-180     #RA  prednisone 20 chronically     #HLD  on hold for elevated LFT;s which improved. keep on hold for now     #DVT ppx  hep gtt Topical Clindamycin Pregnancy And Lactation Text: This medication is Pregnancy Category B and is considered safe during pregnancy. It is unknown if it is excreted in breast milk.

## 2022-11-18 NOTE — PATIENT PROFILE ADULT - NSPROIMPLANTSMEDDEV_GEN_A_NUR
Keren Delacruz from [address] [place] [address] is calling to notify us that patients Hemoglobin A1C level was 11.3 and wanted to start him on Synjardy 58HH + Trulicity 9.50RT (injectablen) None

## 2023-11-15 NOTE — PHARMACOTHERAPY INTERVENTION NOTE - INTERVENTION CATEGORIES
URI   If your condition worsens or fails to improve we recommend that you receive another evaluation at the urgent care/ER immediately or contact your PCP to discuss your concerns. You must understand that you've received an urgent care treatment only and that you may be released before all your medical problems are known or treated. You the patient will arrange for follouwp care as instructed.   If we discussed that I think your illness is viral, it will not respond to antibiotics and will last 10-14 days.       Use your Zicam over-the-counter since that has worked the best for you in the past if that does not help you can use Flonase which is also over-the-counter  If you just have drainage you can take plain zyrtec, claritin or allegra   Over-the-counter cough syrups as we discussed if your symptoms worsen please advise  Rest and fluids are also important.     Salt water gargles, warm tea with honey and chloraseptic spray as needed for sore throat.   Tylenol or ibuprofen can also be used as directed for pain unless you have an allergy to them or medical condition such as stomach ulcers, kidney or liver disease or blood thinners etc for which you should not be taking these type of medications.     As we discussed her blood pressure will go down over the next 12 hours you do not wish for any refills on her medications today and declined EKG.  Reach out to your primary care to see if you are still established if you are not please call the office and I will put a referral in for you if your symptoms worsen please go to the ER for evaluation     Therapy

## 2025-02-26 NOTE — SWALLOW BEDSIDE ASSESSMENT ADULT - NS SPL SWALLOW CLINIC TRIAL FT
+toleration w/o overt s/s penetration/aspiration w/ thins Detail Level: Detailed Size Of Lesion In Cm (Optional): 0